# Patient Record
Sex: MALE | Race: WHITE | Employment: OTHER | URBAN - METROPOLITAN AREA
[De-identification: names, ages, dates, MRNs, and addresses within clinical notes are randomized per-mention and may not be internally consistent; named-entity substitution may affect disease eponyms.]

---

## 2017-02-15 ENCOUNTER — TELEPHONE (OUTPATIENT)
Dept: ENDOCRINOLOGY | Age: 67
End: 2017-02-15

## 2017-02-15 DIAGNOSIS — E11.65 TYPE 2 DIABETES MELLITUS WITH HYPERGLYCEMIA, WITHOUT LONG-TERM CURRENT USE OF INSULIN (HCC): Primary | ICD-10-CM

## 2017-02-17 ENCOUNTER — HOSPITAL ENCOUNTER (OUTPATIENT)
Dept: LAB | Age: 67
Discharge: HOME | End: 2017-02-17
Payer: MEDICARE

## 2017-02-17 PROCEDURE — 36415 COLL VENOUS BLD VENIPUNCTURE: CPT

## 2017-02-17 PROCEDURE — 80061 LIPID PANEL: CPT

## 2017-02-17 PROCEDURE — 82043 UR ALBUMIN QUANTITATIVE: CPT

## 2017-02-17 PROCEDURE — 80053 COMPREHEN METABOLIC PANEL: CPT

## 2017-02-17 PROCEDURE — 83036 HEMOGLOBIN GLYCOSYLATED A1C: CPT

## 2017-02-24 ENCOUNTER — OFFICE VISIT (OUTPATIENT)
Dept: ENDOCRINOLOGY | Age: 67
End: 2017-02-24

## 2017-02-24 VITALS
WEIGHT: 210.3 LBS | SYSTOLIC BLOOD PRESSURE: 124 MMHG | TEMPERATURE: 97.5 F | HEART RATE: 72 BPM | BODY MASS INDEX: 30.11 KG/M2 | HEIGHT: 70 IN | RESPIRATION RATE: 16 BRPM | DIASTOLIC BLOOD PRESSURE: 70 MMHG

## 2017-02-24 DIAGNOSIS — E11.65 TYPE 2 DIABETES MELLITUS WITH HYPERGLYCEMIA, WITHOUT LONG-TERM CURRENT USE OF INSULIN (HCC): Primary | ICD-10-CM

## 2017-02-24 DIAGNOSIS — I10 ESSENTIAL HYPERTENSION: ICD-10-CM

## 2017-02-24 RX ORDER — LOSARTAN POTASSIUM 25 MG/1
25 TABLET ORAL DAILY
Qty: 30 TAB | Refills: 5 | Status: SHIPPED | OUTPATIENT
Start: 2017-02-24 | End: 2017-12-21 | Stop reason: SDUPTHER

## 2017-02-24 NOTE — PROGRESS NOTES
Mariama Pinto is a 74 y.o. male here for   Chief Complaint   Patient presents with    Diabetes     3 mo f/u       Functional glucose monitor and record keeping system? - yes  Eye exam within last year? - no  Foot exam within last year? - yes 10 mo ago    Lab Results   Component Value Date/Time    Hemoglobin A1c 7.2 02/17/2017 03:42 PM    Hemoglobin A1c (POC) 6.8 11/23/2016 03:18 PM    Hemoglobin A1c, External 9.2 01/19/2016       Wt Readings from Last 3 Encounters:   12/29/16 211 lb (95.7 kg)   12/07/16 212 lb 4.9 oz (96.3 kg)   11/28/16 215 lb (97.5 kg)     Temp Readings from Last 3 Encounters:   12/29/16 98 °F (36.7 °C) (Oral)   12/14/16 97.5 °F (36.4 °C)   12/07/16 98 °F (36.7 °C)     BP Readings from Last 3 Encounters:   12/29/16 135/76   12/14/16 131/79   12/07/16 134/76     Pulse Readings from Last 3 Encounters:   12/29/16 70   12/14/16 80   12/07/16 76

## 2017-02-24 NOTE — MR AVS SNAPSHOT
Visit Information Date & Time Provider Department Dept. Phone Encounter #  
 2/24/2017  3:30 PM Hector Herring MD [facility] [phone] [id number] Follow-up Instructions Return in about 3 months (around 5/24/2017). Upcoming Health Maintenance Date Due Hepatitis C Screening 1950 FOOT EXAM Q1 7/16/1958 EYE EXAM RETINAL OR DILATED Q1 7/16/1958 DTaP/Tdap/Td series (1 - Tdap) 7/16/1969 FOBT Q 1 YEAR AGE 50-75 7/16/1998 ZOSTER VACCINE AGE 60> 7/16/2008 GLAUCOMA SCREENING Q2Y 7/16/2013 Pneumococcal 65+ Low/Medium Risk (1 of 2 - PCV13) 7/16/2013 MEDICARE YEARLY EXAM 7/16/2013 INFLUENZA AGE 9 TO ADULT 8/1/2016 HEMOGLOBIN A1C Q6M 8/17/2017 MICROALBUMIN Q1 2/17/2018 LIPID PANEL Q1 2/17/2018 Allergies as of 2/24/2017  Review Complete On: 2/24/2017 By: Hector Herring MD  
  
 Severity Noted Reaction Type Reactions Erythromycin  05/12/2014    Nausea and Vomiting Current Immunizations  Never Reviewed No immunizations on file. Not reviewed this visit You Were Diagnosed With   
  
 Codes Comments Type 2 diabetes mellitus with hyperglycemia, without long-term current use of insulin (HCC)    -  Primary ICD-10-CM: E11.65 ICD-9-CM: 250.00, 790.29 Essential hypertension     ICD-10-CM: I10 
ICD-9-CM: 401.9 Vitals BP  
  
  
  
  
  
 124/70 (BP 1 Location: Left arm, BP Patient Position: Sitting) Vitals History BMI and BSA Data Body Mass Index Body Surface Area  
 30.17 kg/m 2 2.17 m 2 Preferred Pharmacy Pharmacy Name Phone [facility] [address] Your Updated Medication List  
  
   
This list is accurate as of: 2/24/17  4:09 PM.  Always use your most recent med list.  
  
  
  
  
 losartan 25 mg tablet Commonly known as:  COZAAR Take 1 Tab by mouth daily. Follow-up Instructions Return in about 3 months (around 5/24/2017). Introducing [facility]! [facility] introduces [employer] patient portal. Now you can access parts of your medical record, email your doctor's office, and request medication refills online. 1. In your internet browser, go to [email or web address] 2. Click on the First Time User? Click Here link in the Sign In box. You will see the New Member Sign Up page. 3. Enter your Shoplocal Access Code exactly as it appears below. You will not need to use this code after youve completed the sign-up process. If you do not sign up before the expiration date, you must request a new code. · Shoplocal Access Code: [email or web address] Expires: 5/25/2017  4:09 PM 
 
4. Enter the last four digits of your Social Security Number (xxxx) and Date of Birth (mm/dd/yyyy) as indicated and click Submit. You will be taken to the next sign-up page. 5. Create a Shoplocal ID. This will be your Shoplocal login ID and cannot be changed, so think of one that is secure and easy to remember. 6. Create a Shoplocal password. You can change your password at any time. 7. Enter your Password Reset Question and Answer. This can be used at a later time if you forget your password. 8. Enter your e-mail address. You will receive e-mail notification when new information is available in [address] 9. Click Sign Up. You can now view and download portions of your medical record. 10. Click the Download Summary menu link to download a portable copy of your medical information. If you have questions, please visit the Frequently Asked Questions section of the Shoplocal website. Remember, [employer] is NOT to be used for urgent needs. For medical emergencies, dial 911. Now available from your iPhone and Android! Please provide this summary of care documentation to your next provider. Your primary care clinician is listed as Yara Ramsey. If you have any questions after today's visit, please call [phone].

## 2017-02-24 NOTE — PROGRESS NOTES
[facility]               Teodora Cruz MD        [address] 78 [phone] Fax [phone]               Patient Information  Date:2/24/2017  Name : Tim Garrison 74 y.o.     YOB: 1950                 Chief Complaint   Patient presents with    Diabetes     3 mo f/u       History of Present Illness: Tim Garrison is a 74 y.o. male here for fu of  Type 2 Diabetes Mellitus. Type 2 Diabetes was diagnosed several years ago  . He was seeing an endocrinologist, Dr. Marcus Mars in [place]. He is not on Metformin   Had hernia surgery - no abd pain     he did not bring the meter or log. I do not have data to adjust the medications. Diet could be better . Wt Readings from Last 3 Encounters:   02/24/17 210 lb 4.8 oz (95.4 kg)   12/29/16 211 lb (95.7 kg)   12/07/16 212 lb 4.9 oz (96.3 kg)       BP Readings from Last 3 Encounters:   02/24/17 124/70   12/29/16 135/76   12/14/16 131/79           Past Medical History:   Diagnosis Date    Diabetes ([facility] [address]     Type 2, diet controlled    Hypertension     Sleep apnea     cpap     Current Outpatient Prescriptions   Medication Sig    losartan (COZAAR) 25 mg tablet Take 1 Tab by mouth daily. No current facility-administered medications for this visit. Allergies   Allergen Reactions    Erythromycin Nausea and Vomiting         Review of Systems:  -   - Gastrointestinal: no dysphagia ,  abdominal pain  - Musculoskeletal: no joint pains no  weakness  - Integumentary: no rashes  - Neurological: no numbness, tingling, no  headaches  - Psychiatric: no depression no  anxiety  - Endocrine: no heat or cold intolerance    Physical Examination:   Blood pressure 124/70, pulse 72, temperature 97.5 °F (36.4 °C), temperature source Oral, resp. rate 16, height 5' 10\" (1.778 m), weight 210 lb 4.8 oz (95.4 kg).  Estimated body mass index is 30.17 kg/(m^2) as calculated from the following: Height as of this encounter: 5' 10\" (1.778 m). -   Weight as of this encounter: 210 lb 4.8 oz (95.4 kg). - General: pleasant, no distress, good eye contact  - HEENT: no pallor, no periorbital edema, EOMI  - Neck: supple, no thyromegaly, no nodules  - Cardiovascular: regular, normal rate, normal S1 and S2,   - Respiratory: clear to auscultation bilaterally  - Gastrointestinal: soft, nontender, nondistended,   - Neurological: alert and oriented  - Psychiatric: normal mood and affect  - Skin: color, texture, turgor normal.       Data Reviewed:     [] Glucose records reviewed. [] See glucose records for details (to be scanned). [] A1C  [] Reviewed labs     [facility] 8/16    Assessment/Plan:     1. Type 2 diabetes mellitus with hyperglycemia, without long-term current use of insulin ([facility] [address]    2. Essential hypertension        1. Type 2 Diabetes Mellitus with no nephropathy,neuropathy,retinopathy  Lab Results   Component Value Date/Time    Hemoglobin A1c 7.2 02/17/2017 03:42 PM    Hemoglobin A1c (POC) 6.8 11/23/2016 03:18 PM    Hemoglobin A1c, External 9.2 01/19/2016     Diet controlled   Refused Metformin XR    2. HTN : Losartan ,     3 Hernia s/p repair , he used to think abdominal pain was due to Metformin and stopped it , I think his pain was due to hernia     4. Obesity:Body mass index is 30.17 kg/(m^2). Discussed about the importance of exercise and carbohydrate portion control. There are no Patient Instructions on file for this visit. Follow-up Disposition: Not on File    Thank you for allowing me to participate in the care of this patient.     Raudel Viera MD      Patient verbalized understanding

## 2017-04-14 ENCOUNTER — OFFICE VISIT (OUTPATIENT)
Dept: FAMILY MEDICINE CLINIC | Age: 67
End: 2017-04-14

## 2017-04-14 VITALS
RESPIRATION RATE: 18 BRPM | HEART RATE: 73 BPM | OXYGEN SATURATION: 95 % | TEMPERATURE: 97.9 F | BODY MASS INDEX: 29.52 KG/M2 | HEIGHT: 70 IN | DIASTOLIC BLOOD PRESSURE: 82 MMHG | SYSTOLIC BLOOD PRESSURE: 133 MMHG | WEIGHT: 206.2 LBS

## 2017-04-14 DIAGNOSIS — G47.33 OSA ON CPAP: Primary | ICD-10-CM

## 2017-04-14 DIAGNOSIS — Z99.89 OSA ON CPAP: Primary | ICD-10-CM

## 2017-04-14 NOTE — PROGRESS NOTES
Reviewed record in preparation for visit and have necessary documentation  Pt did not bring medication to office visit for review  Information was given on Shingles Vaccine  opportunity was given for questions  Goals that were addressed and/or need to be completed during or after this appointment include   Health Maintenance Due   Topic Date Due    Hepatitis C Screening  1950    FOOT EXAM Q1  07/16/1958    EYE EXAM RETINAL OR DILATED Q1  07/16/1958    DTaP/Tdap/Td series (1 - Tdap) 07/16/1969    FOBT Q 1 YEAR AGE 50-75  07/16/1998    ZOSTER VACCINE AGE 60>  07/16/2008    GLAUCOMA SCREENING Q2Y  07/16/2013    Pneumococcal 65+ Low/Medium Risk (1 of 2 - PCV13) 07/16/2013    MEDICARE YEARLY EXAM  07/16/2013    INFLUENZA AGE 9 TO ADULT  08/01/2016

## 2017-04-14 NOTE — PATIENT INSTRUCTIONS
Learning About CPAP for Sleep Apnea  What is CPAP? CPAP is a small machine that you use at home every night while you sleep. It increases air pressure in your throat to keep your airway open. When you have sleep apnea, this can help you sleep better so you feel much better. CPAP stands for \"continuous positive airway pressure. \"  The CPAP machine will have one of the following:  · A mask that covers your nose and mouth  · Prongs that fit into your nose  · A mask that covers your nose only, the most common type. This type is called NCPAP. The N stands for \"nasal.\"  Why is it done? CPAP is usually the best treatment for obstructive sleep apnea. It is the first treatment choice and the most widely used. Your doctor may suggest CPAP if you have:  · Moderate to severe sleep apnea. · Sleep apnea and coronary artery disease (CAD) or heart failure. How does it help? · CPAP can help you have more normal sleep, so you feel less sleepy and more alert during the daytime. · CPAP may help keep heart failure or other heart problems from getting worse. · CPAP may help lower your blood pressure. · If you use CPAP, your bed partner may also sleep better because you are not snoring or restless. What are the side effects? Some people who use CPAP have:  · A dry or stuffy nose and a sore throat. · Irritated skin on the face. · Sore eyes. · Bloating. If you have any of these problems, work with your doctor to fix them. Here are some things you can try:  · Be sure the mask or nasal prongs fit well. · See if your doctor can adjust the pressure of your CPAP. · If your nose is dry, try a humidifier. · If your nose is runny or stuffy, try decongestant medicine or a steroid nasal spray. Be safe with medicines. Read and follow all instructions on the label. Do not use the medicine longer than the label says. If these things do not help, you might try a different type of machine.  Some machines have air pressure that adjusts on its own. Others have air pressures that are different when you breathe in than when you breathe out. This may reduce discomfort caused by too much pressure in your nose. Where can you learn more? Go to http://martin-cassandra.info/. Enter F159 in the search box to learn more about \"Learning About CPAP for Sleep Apnea. \"  Current as of: May 23, 2016  Content Version: 11.2  © 3759-8909 [employer]. Care instructions adapted under license by [employer] (which disclaims liability or warranty for this information). If you have questions about a medical condition or this instruction, always ask your healthcare professional. [place] [address] any warranty or liability for your use of this information. Sleep Apnea: Care Instructions  Your Care Instructions  Sleep apnea means that you frequently stop breathing for 10 seconds or longer during sleep. It can be mild to severe, based on the number of times an hour that you stop breathing or have slowed breathing. Blocked or narrowed airways in your nose, mouth, or throat can cause sleep apnea. Your airway can become blocked when your throat muscles and tongue relax during sleep. You can treat sleep apnea at home by making lifestyle changes. You also can use a CPAP breathing machine that keeps tissues in the throat from blocking your airway. Or your doctor may suggest that you use a breathing device while you sleep. It helps keep your airway open. This could be a device that you put in your mouth. Other examples include strips or disks that you use on your nose. In some cases, surgery may be needed to remove enlarged tissues in the throat. Follow-up care is a key part of your treatment and safety. Be sure to make and go to all appointments, and call your doctor if you are having problems. It's also a good idea to know your test results and keep a list of the medicines you take.   How can you care for yourself at home? · Lose weight, if needed. It may reduce the number of times you stop breathing or have slowed breathing. · Sleep on your side. It may stop mild apnea. If you tend to roll onto your back, sew a pocket in the back of your pajama top. Put a tennis ball into the pocket, and stitch the pocket shut. This will help keep you from sleeping on your back. · Avoid alcohol and medicines such as sleeping pills and sedatives before bed. · Do not smoke. Smoking can make sleep apnea worse. If you need help quitting, talk to your doctor about stop-smoking programs and medicines. These can increase your chances of quitting for good. · Prop up the head of your bed 4 to 6 inches by putting bricks under the legs of the bed. · Treat breathing problems, such as a stuffy nose, caused by a cold or allergies. · Try a continuous positive airway pressure (CPAP) breathing machine if your doctor recommends it. The machine keeps your airway open when you sleep. · If CPAP does not work for you, ask your doctor if you can try other breathing machines. A bilevel positive airway pressure machine uses one type of air pressure for breathing in and another type for breathing out. Another device raises or lowers air pressure as needed while you breathe. · Talk to your doctor if:  ¨ Your nose feels dry or bleeds when you use one of these machines. You may need to increase moisture in the air. A humidifier may help. ¨ Your nose is runny or stuffy from using a breathing machine. Decongestants or a corticosteroid nasal spray may help. ¨ You are sleepy during the day and it gets in the way of the normal things you do. Do not drive when you are drowsy. When should you call for help? Watch closely for changes in your health, and be sure to contact your doctor if:  · You still have sleep apnea even though you have made lifestyle changes. · You are thinking of trying a device such as CPAP.   · You are having problems using a CPAP or similar machine. Where can you learn more? Go to http://martin-cassandra.info/. Enter D480 in the search box to learn more about \"Sleep Apnea: Care Instructions. \"  Current as of: May 23, 2016  Content Version: 11.2  © 4435-9261 [employer], [employer]. Care instructions adapted under license by [employer] (which disclaims liability or warranty for this information). If you have questions about a medical condition or this instruction, always ask your healthcare professional. [address] any warranty or liability for your use of this information.

## 2017-04-14 NOTE — PROGRESS NOTES
Radha Lepe is a 74 y.o. male who presents with the following complaints:  Chief Complaint   Patient presents with    New Patient     Cpap       Subjective:    HPI:   Patient presents requesting orders for CPAP. He was advised that I could write new orders for him today by the appointment center. He has recently moved to the area and states his CPAP machine is broken. He has not been sleeping well for the past week since it stopped working. We have on hand a copy of his latest sleep study performed 2 years ago in at [facility] in [place]. He reports he was dx with JOVANY 15 years ago and has been using CPAP ever since. His diabetes is being managed by Dr. Kamilah Higginbotham. He reports he was advised to start metformin but refused, reports he is controlling with diet.     Lab Results   Component Value Date/Time    Hemoglobin A1c 7.2 02/17/2017 03:42 PM    Hemoglobin A1c (POC) 6.8 11/23/2016 03:18 PM    Hemoglobin A1c, External 9.2 01/19/2016           Pertinent PMH/FH/SH:  Past Medical History:   Diagnosis Date    Diabetes ([address]     Type 2, diet controlled    Hypertension     Sleep apnea     cpap     Past Surgical History:   Procedure Laterality Date    HX HERNIA REPAIR Bilateral      Family History   Problem Relation Age of Onset    Cancer Mother      melanoma    Heart Disease Father      Social History     Social History    Marital status:      Spouse name: N/A    Number of children: N/A    Years of education: N/A     Social History Main Topics    Smoking status: Never Smoker    Smokeless tobacco: Never Used    Alcohol use Yes      Comment: 1-2 drinks every 3 months    Drug use: No    Sexual activity: Not Asked     Other Topics Concern    None     Social History Narrative     Advanced Directives: N      Patient Active Problem List    Diagnosis    Type 2 diabetes mellitus with hyperglycemia, without long-term current use of insulin ([address]    Essential hypertension       Nurse notes were reviewed and are correct  Review of Systems - negative except as listed above in the HPI    Objective:     Vitals:    04/14/17 1337   BP: 133/82   Pulse: 73   Resp: 18   Temp: 97.9 °F (36.6 °C)   TempSrc: Oral   SpO2: 95%   Weight: 206 lb 3.2 oz (93.5 kg)   Height: 5' 10\" (1.778 m)     Physical Examination: General appearance - alert, well appearing, and in no distress, oriented to person, place, and time and normal appearing weight  Mental status - normal mood, behavior, speech, dress, motor activity, and thought processes  Chest - clear to auscultation, no wheezes, rales or rhonchi, symmetric air entry  Heart - normal rate, regular rhythm, normal S1, S2, no murmurs, rubs, clicks or gallops  Neurological - alert, oriented, normal speech, no focal findings or movement disorder noted  Skin - normal coloration and turgor    Assessment/ Plan:   Glorine Hand was seen today for new patient. Diagnoses and all orders for this visit:    JOVANY on CPAP  Orders for CPAP need to come from sleep specialist  Apologized for the incorrect information given by appointment staff  We were able to obtain an appointment on 4/20 for the patient with Dao. We will fax a copy of the sleep study from 2015 to their office  -     SLEEP MEDICINE REFERRAL       Follow-up Disposition:  Return in about 4 weeks (around 5/12/2017) for CPE. I have discussed the diagnosis with the patient and the intended plan as seen in the above orders. The patient has received an after-visit summary and questions were answered concerning future plans. The patient verbalizes understanding. Medication Side Effects and Warnings were discussed with patient: yes  Patient Labs were reviewed and or requested: yes  Patient Past Records were reviewed and or requested: yes    Patient Instructions        Learning About CPAP for Sleep Apnea  What is CPAP? CPAP is a small machine that you use at home every night while you sleep.  It increases air pressure in your throat to keep your airway open. When you have sleep apnea, this can help you sleep better so you feel much better. CPAP stands for \"continuous positive airway pressure. \"  The CPAP machine will have one of the following:  · A mask that covers your nose and mouth  · Prongs that fit into your nose  · A mask that covers your nose only, the most common type. This type is called NCPAP. The N stands for \"nasal.\"  Why is it done? CPAP is usually the best treatment for obstructive sleep apnea. It is the first treatment choice and the most widely used. Your doctor may suggest CPAP if you have:  · Moderate to severe sleep apnea. · Sleep apnea and coronary artery disease (CAD) or heart failure. How does it help? · CPAP can help you have more normal sleep, so you feel less sleepy and more alert during the daytime. · CPAP may help keep heart failure or other heart problems from getting worse. · CPAP may help lower your blood pressure. · If you use CPAP, your bed partner may also sleep better because you are not snoring or restless. What are the side effects? Some people who use CPAP have:  · A dry or stuffy nose and a sore throat. · Irritated skin on the face. · Sore eyes. · Bloating. If you have any of these problems, work with your doctor to fix them. Here are some things you can try:  · Be sure the mask or nasal prongs fit well. · See if your doctor can adjust the pressure of your CPAP. · If your nose is dry, try a humidifier. · If your nose is runny or stuffy, try decongestant medicine or a steroid nasal spray. Be safe with medicines. Read and follow all instructions on the label. Do not use the medicine longer than the label says. If these things do not help, you might try a different type of machine. Some machines have air pressure that adjusts on its own. Others have air pressures that are different when you breathe in than when you breathe out.  This may reduce discomfort caused by too much pressure in your nose. Where can you learn more? Go to http://martin-cassandra.info/. Enter A033 in the search box to learn more about \"Learning About CPAP for Sleep Apnea. \"  Current as of: May 23, 2016  Content Version: 11.2  © 7814-3867 [employer]. Care instructions adapted under license by [employer] (which disclaims liability or warranty for this information). If you have questions about a medical condition or this instruction, always ask your healthcare professional. [place] [address] any warranty or liability for your use of this information. Sleep Apnea: Care Instructions  Your Care Instructions  Sleep apnea means that you frequently stop breathing for 10 seconds or longer during sleep. It can be mild to severe, based on the number of times an hour that you stop breathing or have slowed breathing. Blocked or narrowed airways in your nose, mouth, or throat can cause sleep apnea. Your airway can become blocked when your throat muscles and tongue relax during sleep. You can treat sleep apnea at home by making lifestyle changes. You also can use a CPAP breathing machine that keeps tissues in the throat from blocking your airway. Or your doctor may suggest that you use a breathing device while you sleep. It helps keep your airway open. This could be a device that you put in your mouth. Other examples include strips or disks that you use on your nose. In some cases, surgery may be needed to remove enlarged tissues in the throat. Follow-up care is a key part of your treatment and safety. Be sure to make and go to all appointments, and call your doctor if you are having problems. It's also a good idea to know your test results and keep a list of the medicines you take. How can you care for yourself at home? · Lose weight, if needed. It may reduce the number of times you stop breathing or have slowed breathing.   · Sleep on your side. It may stop mild apnea. If you tend to roll onto your back, sew a pocket in the back of your pajama top. Put a tennis ball into the pocket, and stitch the pocket shut. This will help keep you from sleeping on your back. · Avoid alcohol and medicines such as sleeping pills and sedatives before bed. · Do not smoke. Smoking can make sleep apnea worse. If you need help quitting, talk to your doctor about stop-smoking programs and medicines. These can increase your chances of quitting for good. · Prop up the head of your bed 4 to 6 inches by putting bricks under the legs of the bed. · Treat breathing problems, such as a stuffy nose, caused by a cold or allergies. · Try a continuous positive airway pressure (CPAP) breathing machine if your doctor recommends it. The machine keeps your airway open when you sleep. · If CPAP does not work for you, ask your doctor if you can try other breathing machines. A bilevel positive airway pressure machine uses one type of air pressure for breathing in and another type for breathing out. Another device raises or lowers air pressure as needed while you breathe. · Talk to your doctor if:  ¨ Your nose feels dry or bleeds when you use one of these machines. You may need to increase moisture in the air. A humidifier may help. ¨ Your nose is runny or stuffy from using a breathing machine. Decongestants or a corticosteroid nasal spray may help. ¨ You are sleepy during the day and it gets in the way of the normal things you do. Do not drive when you are drowsy. When should you call for help? Watch closely for changes in your health, and be sure to contact your doctor if:  · You still have sleep apnea even though you have made lifestyle changes. · You are thinking of trying a device such as CPAP. · You are having problems using a CPAP or similar machine. Where can you learn more? Go to http://martin-cassandra.info/.   Enter O890 in the search box to learn more about \"Sleep Apnea: Care Instructions. \"  Current as of: May 23, 2016  Content Version: 11.2  © 1776-0686 [employer]. Care instructions adapted under license by [employer] (which disclaims liability or warranty for this information). If you have questions about a medical condition or this instruction, always ask your healthcare professional. [place] [address] any warranty or liability for your use of this information.           Alicia SOLIS

## 2017-04-14 NOTE — MR AVS SNAPSHOT
Visit Information Date & Time Provider Department Dept. Phone Encounter #  
 4/14/2017  1:30 PM [facility] [phone] [id number] Follow-up Instructions Return in about 4 weeks (around 5/12/2017) for CPE. Your Appointments 4/20/2017  9:20 AM  
New Patient with Tapan Martínez MD  
[address] ([facility]) Appt Note: NP; ref NP Rachel Backers; needs CPAP continuation/treatment; Medicare (prev sleep records will be faxed) [address] [facility] [address] [phone] [address] [facility] [phone] Upcoming Health Maintenance Date Due Hepatitis C Screening 1950 FOOT EXAM Q1 7/16/1958 EYE EXAM RETINAL OR DILATED Q1 7/16/1958 DTaP/Tdap/Td series (1 - Tdap) 7/16/1969 FOBT Q 1 YEAR AGE 50-75 7/16/1998 ZOSTER VACCINE AGE 60> 7/16/2008 GLAUCOMA SCREENING Q2Y 7/16/2013 Pneumococcal 65+ Low/Medium Risk (1 of 2 - PCV13) 7/16/2013 MEDICARE YEARLY EXAM 7/16/2013 INFLUENZA AGE 9 TO ADULT 8/1/2016 HEMOGLOBIN A1C Q6M 8/17/2017 MICROALBUMIN Q1 2/17/2018 LIPID PANEL Q1 2/17/2018 Allergies as of 4/14/2017  Review Complete On: 4/14/2017 By: Salvadore Moritz, NP Severity Noted Reaction Type Reactions Erythromycin  05/12/2014    Nausea and Vomiting Current Immunizations  Never Reviewed No immunizations on file. Not reviewed this visit You Were Diagnosed With   
  
 Codes Comments JOVANY on CPAP    -  Primary ICD-10-CM: G47.33, Z99.89 ICD-9-CM: 327.23, V46.8 Vitals BP Pulse Temp Resp Height(growth percentile) Weight(growth percentile) 133/82 (BP 1 Location: Left arm, BP Patient Position: Sitting) 73 97.9 °F (36.6 °C) (Oral) 18 5' 10\" (1.778 m) 206 lb 3.2 oz (93.5 kg) SpO2 BMI Smoking Status 95% 29.59 kg/m2 Never Smoker Vitals History BMI and BSA Data Body Mass Index Body Surface Area  
 29.59 kg/m 2 2.15 m 2 Preferred Pharmacy Pharmacy Name Phone [facility] [address] Your Updated Medication List  
  
   
This list is accurate as of: 4/14/17  2:03 PM.  Always use your most recent med list.  
  
  
  
  
 losartan 25 mg tablet Commonly known as:  COZAAR Take 1 Tab by mouth daily. We Performed the Following SLEEP MEDICINE REFERRAL [HYL729 Custom] Comments:  
 Please evaluate patient for JOVANY, needs new cpap orders. Follow-up Instructions Return in about 4 weeks (around 5/12/2017) for CPE. Referral Information Referral ID Referred By Referred To  
  
 [id number] Jonathan Yamil, [address] [facility] [address] Phone: [phone] Fax: [phone] Visits Status Start Date End Date 1 New Request 4/14/17 4/14/18 If your referral has a status of pending review or denied, additional information will be sent to support the outcome of this decision. Patient Instructions Learning About CPAP for Sleep Apnea What is CPAP? CPAP is a small machine that you use at home every night while you sleep. It increases air pressure in your throat to keep your airway open. When you have sleep apnea, this can help you sleep better so you feel much better. CPAP stands for \"continuous positive airway pressure. \" The CPAP machine will have one of the following: · A mask that covers your nose and mouth · Prongs that fit into your nose · A mask that covers your nose only, the most common type. This type is called NCPAP. The N stands for \"nasal.\" Why is it done? CPAP is usually the best treatment for obstructive sleep apnea. It is the first treatment choice and the most widely used. Your doctor may suggest CPAP if you have: · Moderate to severe sleep apnea. · Sleep apnea and coronary artery disease (CAD) or heart failure. How does it help? · CPAP can help you have more normal sleep, so you feel less sleepy and more alert during the daytime. · CPAP may help keep heart failure or other heart problems from getting worse. · CPAP may help lower your blood pressure. · If you use CPAP, your bed partner may also sleep better because you are not snoring or restless. What are the side effects? Some people who use CPAP have: · A dry or stuffy nose and a sore throat. · Irritated skin on the face. · Sore eyes. · Bloating. If you have any of these problems, work with your doctor to fix them. Here are some things you can try: · Be sure the mask or nasal prongs fit well. · See if your doctor can adjust the pressure of your CPAP. · If your nose is dry, try a humidifier. · If your nose is runny or stuffy, try decongestant medicine or a steroid nasal spray. Be safe with medicines. Read and follow all instructions on the label. Do not use the medicine longer than the label says. If these things do not help, you might try a different type of machine. Some machines have air pressure that adjusts on its own. Others have air pressures that are different when you breathe in than when you breathe out. This may reduce discomfort caused by too much pressure in your nose. Where can you learn more? Go to http://martin-cassandra.info/. Enter K586 in the search box to learn more about \"Learning About CPAP for Sleep Apnea. \" Current as of: May 23, 2016 Content Version: 11.2 © 8437-6154 Remotemedical. Care instructions adapted under license by Dhing[employer] (which disclaims liability or warranty for this information).  If you have questions about a medical condition or this instruction, always ask your healthcare professional. [address] any warranty or liability for your use of this information. Sleep Apnea: Care Instructions Your Care Instructions Sleep apnea means that you frequently stop breathing for 10 seconds or longer during sleep. It can be mild to severe, based on the number of times an hour that you stop breathing or have slowed breathing. Blocked or narrowed airways in your nose, mouth, or throat can cause sleep apnea. Your airway can become blocked when your throat muscles and tongue relax during sleep. You can treat sleep apnea at home by making lifestyle changes. You also can use a CPAP breathing machine that keeps tissues in the throat from blocking your airway. Or your doctor may suggest that you use a breathing device while you sleep. It helps keep your airway open. This could be a device that you put in your mouth. Other examples include strips or disks that you use on your nose. In some cases, surgery may be needed to remove enlarged tissues in the throat. Follow-up care is a key part of your treatment and safety. Be sure to make and go to all appointments, and call your doctor if you are having problems. It's also a good idea to know your test results and keep a list of the medicines you take. How can you care for yourself at home? · Lose weight, if needed. It may reduce the number of times you stop breathing or have slowed breathing. · Sleep on your side. It may stop mild apnea. If you tend to roll onto your back, sew a pocket in the back of your pajama top. Put a tennis ball into the pocket, and stitch the pocket shut. This will help keep you from sleeping on your back. · Avoid alcohol and medicines such as sleeping pills and sedatives before bed. · Do not smoke. Smoking can make sleep apnea worse. If you need help quitting, talk to your doctor about stop-smoking programs and medicines. These can increase your chances of quitting for good. · Prop up the head of your bed 4 to 6 inches by putting bricks under the legs of the bed. · Treat breathing problems, such as a stuffy nose, caused by a cold or allergies. · Try a continuous positive airway pressure (CPAP) breathing machine if your doctor recommends it. The machine keeps your airway open when you sleep. · If CPAP does not work for you, ask your doctor if you can try other breathing machines. A bilevel positive airway pressure machine uses one type of air pressure for breathing in and another type for breathing out. Another device raises or lowers air pressure as needed while you breathe. · Talk to your doctor if: 
¨ Your nose feels dry or bleeds when you use one of these machines. You may need to increase moisture in the air. A humidifier may help. ¨ Your nose is runny or stuffy from using a breathing machine. Decongestants or a corticosteroid nasal spray may help. ¨ You are sleepy during the day and it gets in the way of the normal things you do. Do not drive when you are drowsy. When should you call for help? Watch closely for changes in your health, and be sure to contact your doctor if: 
· You still have sleep apnea even though you have made lifestyle changes. · You are thinking of trying a device such as CPAP. · You are having problems using a CPAP or similar machine. Where can you learn more? Go to http://martin-cassandra.info/. Enter Y807 in the search box to learn more about \"Sleep Apnea: Care Instructions. \" Current as of: May 23, 2016 Content Version: 11.2 © 7759-8132 WePay. Care instructions adapted under license by Pure Software (which disclaims liability or warranty for this information). If you have questions about a medical condition or this instruction, always ask your healthcare professional. [address] any warranty or liability for your use of this information. Introducing [facility]!    
 [facility] introduces MetaSolv patient portal. Now you can access parts of your medical record, email your doctor's office, and request medication refills online. 1. In your internet browser, go to [email or web address] 2. Click on the First Time User? Click Here link in the Sign In box. You will see the New Member Sign Up page. 3. Enter your [employer] Access Code exactly as it appears below. You will not need to use this code after youve completed the sign-up process. If you do not sign up before the expiration date, you must request a new code. · [employer] Access Code: [email or web address] Expires: 5/25/2017  5:09 PM 
 
4. Enter the last four digits of your Social Security Number (xxxx) and Date of Birth (mm/dd/yyyy) as indicated and click Submit. You will be taken to the next sign-up page. 5. Create a [employer] ID. This will be your [employer] login ID and cannot be changed, so think of one that is secure and easy to remember. 6. Create a [employer] password. You can change your password at any time. 7. Enter your Password Reset Question and Answer. This can be used at a later time if you forget your password. 8. Enter your e-mail address. You will receive e-mail notification when new information is available in [address] 9. Click Sign Up. You can now view and download portions of your medical record. 10. Click the Download Summary menu link to download a portable copy of your medical information. If you have questions, please visit the Frequently Asked Questions section of the [employer] website. Remember, [employer] is NOT to be used for urgent needs. For medical emergencies, dial 911. Now available from your iPhone and Android! Please provide this summary of care documentation to your next provider. Your primary care clinician is listed as Jim Dozier. If you have any questions after today's visit, please call [phone].

## 2017-04-20 ENCOUNTER — DOCUMENTATION ONLY (OUTPATIENT)
Dept: SLEEP MEDICINE | Age: 67
End: 2017-04-20

## 2017-04-20 ENCOUNTER — OFFICE VISIT (OUTPATIENT)
Dept: SLEEP MEDICINE | Age: 67
End: 2017-04-20

## 2017-04-20 VITALS
WEIGHT: 207 LBS | HEIGHT: 70 IN | OXYGEN SATURATION: 95 % | DIASTOLIC BLOOD PRESSURE: 70 MMHG | TEMPERATURE: 98 F | BODY MASS INDEX: 29.63 KG/M2 | SYSTOLIC BLOOD PRESSURE: 146 MMHG | HEART RATE: 64 BPM

## 2017-04-20 DIAGNOSIS — G47.33 OSA (OBSTRUCTIVE SLEEP APNEA): Primary | ICD-10-CM

## 2017-04-20 DIAGNOSIS — E66.3 OVERWEIGHT (BMI 25.0-29.9): ICD-10-CM

## 2017-04-20 NOTE — PROGRESS NOTES
217 [facility]., Tameka Lyle [address], [address]  Tel.  [phone]  Fax. 100 [facility] 60  [place], [address] [facility]  Tel.  [phone]  Fax. [phone] [address]   Tel.  [phone]  Fax. [phone]       Subjective:      Juve Nunez is a 74 y.o. male who I am asked to see in consultation for evaluation and management of sleep apnea. He was diagnosed with sleep apnea several years ago. He is using his device regularly. He complains of awakening in the middle of the night because of SOB associated with CPAP dysfunction. Symptoms began a few weeks ago, unchanged since that time. He usually can fall asleep in 5 minutes. Family or house members note snoring, choking. He denies completely or partially paralyzed while falling asleep or waking up. There are maximal  mask comfort problems. The following problems are noted with PAP:     Drowsiness yes Problems exhaling yes   Snoring yes Forget to put on no   Mask Comfortable yes Can't fall asleep yes   Dry Mouth yes Mask falls off yes   Air Leaking no Frequent awakenings yes     Juve Nunez does wake up frequently at night. He   bothered by waking up too early and left unable to get back to sleep. He actually sleeps about 8 hours at night and wakes up about 7 times during the night. He does not work shifts: Giovanny Cantor indicates he does not get too little sleep at night. His bedtime is 2300. He awakens at 0700. He does take naps. He takes 2 naps a week lasting 1, Hour(s). He has the following observed behaviors: Pauses in breathing;  . Other remarks: snoting(unspecified)    [place] Sleepiness Score: 6   which reflect moderate daytime drowsiness. Allergies   Allergen Reactions    Erythromycin Nausea and Vomiting         Current Outpatient Prescriptions:     losartan (COZAAR) 25 mg tablet, Take 1 Tab by mouth daily. , Disp: 30 Tab, Rfl: 5     He  has a past medical history of Diabetes ([address] Hypertension; and Sleep apnea. He also has no past medical history of Malignant hyperthermia due to anesthesia. He  has a past surgical history that includes hernia repair (Bilateral). He family history includes Cancer in his mother; Heart Disease in his father. He  reports that he has never smoked. He has never used smokeless tobacco. He reports that he drinks alcohol. He reports that he does not use illicit drugs. Review of Systems:  Constitutional:  No significant weight loss or weight gain. Eyes:  No blurred vision. CVS:  No significant chest pain  Pulm:  No significant shortness of breath  GI:  No significant nausea or vomiting  :  No significant nocturia  Musculoskeletal:  No significant joint pain at night  Skin:  No significant rashes  Neuro:  No significant dizziness   Psych:  No active mood issues    Sleep Review of Systems: notable for no difficulty falling asleep; frequent awakenings at night;  irregular dreaming noted; no nightmares ; no early morning headaches ; no memory problems; no concentration issues; no history of any automobile or occupational accidents due to daytime drowsiness. Objective:     Visit Vitals    /70    Pulse 64    Temp 98 °F (36.7 °C)    Ht 5' 10\" (1.778 m)    Wt 207 lb (93.9 kg)    SpO2 95%    BMI 29.7 kg/m2    Neck circ. in \"inches\": 16.5      General:   Alert, oriented, not in distress   Neck:   No JVD    Chest/Lungs:  symetrical lung expansion , no accessory muscle use    Extremities:  no obvious rashes , negative edema    Neuro:  No focal deficits ; No obvious tremor    Psych:  Normal affect ,  Normal countenance ;         Assessment:       ICD-10-CM ICD-9-CM    1. JOVANY (obstructive sleep apnea) G47.33 327.23 AMB SUPPLY ORDER   2. Overweight (BMI 25.0-29. 9) E66.3 278.02        Plan:     * He was provided information on sleep apnea including coresponding risk factors and the importance of proper treatment.   * He was likewise counseled on weight management and lateral sleeping posture (with the use of bed pillows or wedge) which may reduce sleep apnea events. Caution with hypnotics, alcohol, and sedating pain medications as these can worsen sleep apnea. * We've requested previous sleep records and studies. Orders Placed This Encounter    AMB SUPPLY ORDER     * Loaner repair/replace Positive Airway Pressure device. Current device dysfunctional.  * AutoCPAP APAP 5 cmH2O - 15 cmH2O. Flex  As needed  * Respironics device with heated tube as needed  PAP Mask  patient preference,heated humidifer, tubing, filters (all sleep supplies) as needed    Wireless modem enrollment with privileges to change therapy remotely - indefinite. Length of Need = 99 months      Catalino Castillo M.D.  (electronically signed)  Diplomate in Sleep Medicine, [facility]   * PAP card download in 4 weeks. PAP clinic if adherence remains poor    Counseling was provided regarding the importance of regular PAP use and on proper sleep hygiene and safe driving. I have reviewed/discussed the above normal BMI with the patient. I have recommended the following interventions: dietary management education, guidance, and counseling . Lorenso Frankel Thank you for allowing us to participate in your patient's medical care.     Catalino Castillo M.D.  (electronically signed)  Diplomate in Sleep Medicine, [facility]

## 2017-04-20 NOTE — PROGRESS NOTES
Sleep records request from Dr. Heriberto Darnell, urgent request submitted fax # [phone] or [phone].

## 2017-04-20 NOTE — PROGRESS NOTES
Order faxed to [address] [place] [address] Rd.. However, still awaiting previous records (baseline sleep study). We have faxed medical release request to prior 's office in [place].   He moved from [place]

## 2017-04-20 NOTE — MR AVS SNAPSHOT
Visit Information Date & Time Provider Department Dept. Phone Encounter #  
 4/20/2017  9:20 AM Sulaiman Poe MD [facility] 53 [id number] Follow-up Instructions Return if symptoms worsen or fail to improve. Follow-up and Disposition History Upcoming Health Maintenance Date Due Hepatitis C Screening 1950 FOOT EXAM Q1 7/16/1958 EYE EXAM RETINAL OR DILATED Q1 7/16/1958 DTaP/Tdap/Td series (1 - Tdap) 7/16/1969 FOBT Q 1 YEAR AGE 50-75 7/16/1998 ZOSTER VACCINE AGE 60> 7/16/2008 GLAUCOMA SCREENING Q2Y 7/16/2013 Pneumococcal 65+ Low/Medium Risk (1 of 2 - PCV13) 7/16/2013 MEDICARE YEARLY EXAM 7/16/2013 INFLUENZA AGE 9 TO ADULT 8/1/2016 HEMOGLOBIN A1C Q6M 8/17/2017 MICROALBUMIN Q1 2/17/2018 LIPID PANEL Q1 2/17/2018 Allergies as of 4/20/2017  Review Complete On: 4/20/2017 By: Sulaiman Poe MD  
  
 Severity Noted Reaction Type Reactions Erythromycin  05/12/2014    Nausea and Vomiting Current Immunizations  Never Reviewed No immunizations on file. Not reviewed this visit You Were Diagnosed With   
  
 Codes Comments JOVANY (obstructive sleep apnea)    -  Primary ICD-10-CM: G47.33 
ICD-9-CM: 327.23 Overweight (BMI 25.0-29. 9)     ICD-10-CM: Y38.4 ICD-9-CM: 278.02 Vitals BP Pulse Temp Height(growth percentile) Weight(growth percentile) SpO2  
 146/70 64 98 °F (36.7 °C) 5' 10\" (1.778 m) 207 lb (93.9 kg) 95% BMI Smoking Status 29.7 kg/m2 Never Smoker Vitals History BMI and BSA Data Body Mass Index Body Surface Area  
 29.7 kg/m 2 2.15 m 2 Preferred Pharmacy Pharmacy Name Phone [facility] [address] [place] [address] [address] Your Updated Medication List  
  
   
This list is accurate as of: 4/20/17  9:35 AM.  Always use your most recent med list.  
  
  
  
  
 losartan 25 mg tablet Commonly known as:  COZAAR Take 1 Tab by mouth daily. We Performed the Following AMB SUPPLY ORDER [[id number] Custom] Comments:  
 * Loaner repair/replace Positive Airway Pressure device. Current device dysfunctional. 
* AutoCPAP APAP 5 cmH2O - 15 cmH2O. Flex  As needed * Respironics device with heated tube as needed PAP Mask  patient preference,heated humidifer, tubing, filters (all sleep supplies) as needed Wireless modem enrollment with privileges to change therapy remotely - indefinite. Length of Need = 99 months Jodi Jeffries M.D.  (electronically signed) Diplomate in Sleep Medicine, [facility] Follow-up Instructions Return if symptoms worsen or fail to improve. Patient Instructions 217 [facility]., [place]. [address] Tel.  [phone] Fax. 100 [facility] [address] [place], [address] [facility] Tel.  [phone] Fax. [phone] [address] [place] [address] Tel.  [phone] Fax. [phone] Learning About CPAP for Sleep Apnea What is CPAP? CPAP is a small machine that you use at home every night while you sleep. It increases air pressure in your throat to keep your airway open. When you have sleep apnea, this can help you sleep better so you feel much better. CPAP stands for \"continuous positive airway pressure. \" The CPAP machine will have one of the following: · A mask that covers your nose and mouth · Prongs that fit into your nose · A mask that covers your nose only, the most common type. This type is called NCPAP. The N stands for \"nasal.\" Why is it done? CPAP is usually the best treatment for obstructive sleep apnea. It is the first treatment choice and the most widely used. Your doctor may suggest CPAP if you have: · Moderate to severe sleep apnea. · Sleep apnea and coronary artery disease (CAD) or heart failure. How does it help? · CPAP can help you have more normal sleep, so you feel less sleepy and more alert during the daytime. · CPAP may help keep heart failure or other heart problems from getting worse. · NCPAP may help lower your blood pressure. · If you use CPAP, your bed partner may also sleep better because you are not snoring or restless. What are the side effects? Some people who use CPAP have: · A dry or stuffy nose and a sore throat. · Irritated skin on the face. · Sore eyes. · Bloating. If you have any of these problems, work with your doctor to fix them. Here are some things you can try: · Be sure the mask or nasal prongs fit well. · See if your doctor can adjust the pressure of your CPAP. · If your nose is dry, try a humidifier. · If your nose is runny or stuffy, try decongestant medicine or a steroid nasal spray. If these things do not help, you might try a different type of machine. Some machines have air pressure that adjusts on its own. Others have air pressures that are different when you breathe in than when you breathe out. This may reduce discomfort caused by too much pressure in your nose. Where can you learn more? Go to HutGrip.be Enter E205 in the search box to learn more about \"Learning About CPAP for Sleep Apnea. \"  
© 5959-2450 Healthwise, Incorporated. Care instructions adapted under license by [employer] (which disclaims liability or warranty for this information). This care instruction is for use with your licensed healthcare professional. If you have questions about a medical condition or this instruction, always ask your healthcare professional. [place] [address] any warranty or liability for your use of this information. Content Version: 5.0.07725; Last Revised: January 11, 2010 PROPER SLEEP HYGIENE What to avoid · Do not have drinks with caffeine, such as coffee or black tea, for 8 hours before bed. · Do not smoke or use other types of tobacco near bedtime. Nicotine is a stimulant and can keep you awake. · Avoid drinking alcohol late in the evening, because it can cause you to wake in the middle of the night. · Do not eat a big meal close to bedtime. If you are hungry, eat a light snack. · Do not drink a lot of water close to bedtime, because the need to urinate may wake you up during the night. · Do not read or watch TV in bed. Use the bed only for sleeping and sexual activity. What to try · Go to bed at the same time every night, and wake up at the same time every morning. Do not take naps during the day. · Keep your bedroom quiet, dark, and cool. · Get regular exercise, but not within 3 to 4 hours of your bedtime. Alexis Meehan · Sleep on a comfortable pillow and mattress. · If watching the clock makes you anxious, turn it facing away from you so you cannot see the time. · If you worry when you lie down, start a worry book. Well before bedtime, write down your worries, and then set the book and your concerns aside. · Try meditation or other relaxation techniques before you go to bed. · If you cannot fall asleep, get up and go to another room until you feel sleepy. Do something relaxing. Repeat your bedtime routine before you go to bed again. · Make your house quiet and calm about an hour before bedtime. Turn down the lights, turn off the TV, log off the computer, and turn down the volume on music. This can help you relax after a busy day. Drowsy Driving: The [employer] cites drowsiness as a causing factor in more than 167,551 police reported crashes annually, resulting in 76,000 injuries and 1,500 deaths. Other surveys suggest 55% of people polled have driven while drowsy in the past year, 23% had fallen asleep but not crashed, 3% crashed, and 2% had and accident due to drowsy driving. Who is at risk? Young Drivers: One study of drowsy driving accidents states that 55% of the drivers were under 25 years. Of those, 75% were male. Shift Workers and Travelers: People who work overnight or travel across time zones frequently are at higher risk of experiencing Circadian Rhythm Disorders. They are trying to work and function when their body is programed to sleep. Sleep Deprived: Lack of sleep has a serious impact on your ability to pay attention or focus on a task. Consistently getting less than the average of 8 hours your body needs creates partial or cumulative sleep deprivation. Untreated Sleep Disorders: Sleep Apnea, Narcolepsy, R.L.S., and other sleep disorders (untreated) prevent a person from getting enough restful sleep. This leads to excessive daytime sleepiness and increases the risk for drowsy driving accidents by up to 7 times. Medications / Alcohol: Even over the counter medications can cause drowsiness. Medications that impair a drivers attention should have a warning label. Alcohol naturally makes you sleepy and on its own can cause accidents. Combined with excessive drowsiness its effects are amplified. Signs of Drowsy Driving: * You don't remember driving the last few miles * You may drift out of your mary * You are unable to focus and your thoughts wander * You may yawn more often than normal 
 * You have difficulty keeping your eyes open / nodding off * Missing traffic signs, speeding, or tailgating Prevention-  
Good sleep hygiene, lifestyle and behavioral choices have the most impact on drowsy driving. There is no substitute for sleep and the average person requires 8 hours nightly. If you find yourself driving drowsy, stop and sleep. Consider the sleep hygiene tips provided during your visit as well. Medication Refill Policy: Refills for all medications require 1 week advance notice.  Please have your pharmacy fax a refill request. We are unable to fax, or call in \"controled substance\" medications and you will need to pick these prescriptions up from our office. My[employer]Bung[employer]ow Activation Thank you for requesting access to [employer]. Please follow the instructions below to securely access and download your online medical record. Winning[employer]age allows you to send messages to your doctor, view your test results, renew your prescriptions, schedule appointments, and more. How Do I Sign Up? 1. In your internet browser, go to [email or web address]. 2. Click on the First Time User? Click Here link in the Sign In box. You will see the New Member Sign Up page. 3. Enter your WinningAdvantage Access Code exactly as it appears below. You will not need to use this code after youve completed the sign-up process. If you do not sign up before the expiration date, you must request a new code. Winning[employer]age Access Code: [email or web address] Expires: 2017  5:09 PM (This is the date your Winning[employer]age access code will ) 4. Enter the last four digits of your Social Security Number (xxxx) and Date of Birth (mm/dd/yyyy) as indicated and click Submit. You will be taken to the next sign-up page. 5. Create a WinningAdvantage ID. This will be your WinningAdvantage login ID and cannot be changed, so think of one that is secure and easy to remember. 6. Create a Winning[employer]vantage password. You can change your password at any time. 7. Enter your Password Reset Question and Answer. This can be used at a later time if you forget your password. 8. Enter your e-mail address. You will receive e-mail notification when new information is available in [address] 9. Click Sign Up. You can now view and download portions of your medical record. 10. Click the Download Summary menu link to download a portable copy of your medical information. Additional Information If you have questions, please call [phone].  Remember, [address] is NOT to be used for urgent needs. For medical emergencies, dial 911. Starting a Weight Loss Plan: After Your Visit Your Care Instructions If you are thinking about losing weight, it can be hard to know where to start. Your doctor can help you set up a weight loss plan that best meets your needs. You may want to take a class on nutrition or exercise, or join a weight loss support group. If you have questions about how to make changes to your eating or exercise habits, ask your doctor about seeing a registered dietitian or an exercise specialist. 
It can be a big challenge to lose weight. But you do not have to make huge changes at once. Make small changes, and stick with them. When those changes become habit, add a few more changes. If you do not think you are ready to make changes right now, try to pick a date in the future. Make an appointment to see your doctor to discuss whether the time is right for you to start a plan. Follow-up care is a key part of your treatment and safety. Be sure to make and go to all appointments, and call your doctor if you are having problems. It's also a good idea to know your test results and keep a list of the medicines you take. How can you care for yourself at home? · Set realistic goals. Many people expect to lose much more weight than is likely. A weight loss of 5% to 10% of your body weight may be enough to improve your health. · Get family and friends involved to provide support. Talk to them about why you are trying to lose weight, and ask them to help. They can help by participating in exercise and having meals with you, even if they may be eating something different. · Find what works best for you. If you do not have time or do not like to cook, a program that offers meal replacement bars or shakes may be better for you.  Or if you like to prepare meals, finding a plan that includes daily menus and recipes may be best. 
 · Ask your doctor about other health professionals who can help you achieve your weight loss goals. ¨ A dietitian can help you make healthy changes in your diet. ¨ An exercise specialist or  can help you develop a safe and effective exercise program. 
¨ A counselor or psychiatrist can help you cope with issues such as depression, anxiety, or family problems that can make it hard to focus on weight loss. · Consider joining a support group for people who are trying to lose weight. Your doctor can suggest groups in your area. Where can you learn more? Go to Matchpoint Careers.be Enter S960 in the search box to learn more about \"Starting a Weight Loss Plan: After Your Visit. \"  
© 6407-7742 Healthwise, Incorporated. Care instructions adapted under license by Abdelrahman Ruiz (which disclaims liability or warranty for this information). This care instruction is for use with your licensed healthcare professional. If you have questions about a medical condition or this instruction, always ask your healthcare professional. [address] any warranty or liability for your use of this information. Content Version: 8.4.63494; Last Revised: September 1, 2009 Introducing [facility]! Abdelrahman Ruiz introduces MetricStream patient portal. Now you can access parts of your medical record, email your doctor's office, and request medication refills online. 1. In your internet browser, go to [email or web address] 2. Click on the First Time User? Click Here link in the Sign In box. You will see the New Member Sign Up page. 3. Enter your MetricStream Access Code exactly as it appears below. You will not need to use this code after youve completed the sign-up process. If you do not sign up before the expiration date, you must request a new code. · Met[employer]Stream Access Code: [email or web address] Expires: 5/25/2017  5:09 PM 
 
 4. Enter the last four digits of your Social Security Number (xxxx) and Date of Birth (mm/dd/yyyy) as indicated and click Submit. You will be taken to the next sign-up page. 5. Create a NXVISION ID. This will be your NXVISION login ID and cannot be changed, so think of one that is secure and easy to remember. 6. Create a NXVISION password. You can change your password at any time. 7. Enter your Password Reset Question and Answer. This can be used at a later time if you forget your password. 8. Enter your e-mail address. You will receive e-mail notification when new information is available in [address] 9. Click Sign Up. You can now view and download portions of your medical record. 10. Click the Download Summary menu link to download a portable copy of your medical information. If you have questions, please visit the Frequently Asked Questions section of the NXVISION website. Remember, NXVISION is NOT to be used for urgent needs. For medical emergencies, dial 911. Now available from your iPhone and Android! Please provide this summary of care documentation to your next provider. Your primary care clinician is listed as Xander Bello. If you have any questions after today's visit, please call [phone].

## 2017-04-24 ENCOUNTER — TELEPHONE (OUTPATIENT)
Dept: SLEEP MEDICINE | Age: 67
End: 2017-04-24

## 2017-04-24 DIAGNOSIS — G47.33 OSA (OBSTRUCTIVE SLEEP APNEA): Primary | ICD-10-CM

## 2017-04-24 NOTE — TELEPHONE ENCOUNTER
Patient called requesting sleep study, he does  not want to [place] for sleep records requested from [facility]. Please advise.

## 2017-04-25 ENCOUNTER — HOSPITAL ENCOUNTER (OUTPATIENT)
Dept: SLEEP MEDICINE | Age: 67
Discharge: HOME | End: 2017-04-25
Payer: MEDICARE

## 2017-04-25 ENCOUNTER — OFFICE VISIT (OUTPATIENT)
Dept: SLEEP MEDICINE | Age: 67
End: 2017-04-25

## 2017-04-25 VITALS
WEIGHT: 208 LBS | OXYGEN SATURATION: 93 % | SYSTOLIC BLOOD PRESSURE: 146 MMHG | HEIGHT: 70 IN | DIASTOLIC BLOOD PRESSURE: 84 MMHG | BODY MASS INDEX: 29.78 KG/M2 | TEMPERATURE: 98.4 F | HEART RATE: 65 BPM

## 2017-04-25 DIAGNOSIS — G47.33 OBSTRUCTIVE SLEEP APNEA (ADULT) (PEDIATRIC): Primary | ICD-10-CM

## 2017-04-25 PROCEDURE — 95806 SLEEP STUDY UNATT&RESP EFFT: CPT | Performed by: INTERNAL MEDICINE

## 2017-04-26 ENCOUNTER — TELEPHONE (OUTPATIENT)
Dept: SLEEP MEDICINE | Age: 67
End: 2017-04-26

## 2017-04-26 DIAGNOSIS — G47.33 OSA (OBSTRUCTIVE SLEEP APNEA): Primary | ICD-10-CM

## 2017-04-26 NOTE — TELEPHONE ENCOUNTER
HSAT Returned    Date of Study: 4/25/17    The following information was gathered from the patients study log:    · Lights off: 10:10p  · Estimated sleep onset: 10:35p    · Awakened a total of 6 times  · The patient felt they slept 8.5 hours  · Patient took no sleep aid before starting the test  · Sleep quality was the same compared to a usual nights sleep. Further information provided: \"I've used a CPAP for about 15 years but my CPAP has not been working correctly lately. \"

## 2017-04-27 ENCOUNTER — DOCUMENTATION ONLY (OUTPATIENT)
Dept: SLEEP MEDICINE | Age: 67
End: 2017-04-27

## 2017-04-27 NOTE — PROGRESS NOTES
This note is being routed to NP Rush Jimenez. Sleep Medicine consult note and sleep study report in patient's chart for review.     Thank you for the referral.

## 2017-05-09 ENCOUNTER — DOCUMENTATION ONLY (OUTPATIENT)
Dept: SLEEP MEDICINE | Age: 67
End: 2017-05-09

## 2017-06-01 ENCOUNTER — TELEPHONE (OUTPATIENT)
Dept: SLEEP MEDICINE | Age: 67
End: 2017-06-01

## 2017-07-15 ENCOUNTER — APPOINTMENT (OUTPATIENT)
Dept: GENERAL RADIOLOGY | Age: 67
End: 2017-07-15
Attending: EMERGENCY MEDICINE
Payer: MEDICARE

## 2017-07-15 ENCOUNTER — HOSPITAL ENCOUNTER (EMERGENCY)
Age: 67
Discharge: HOME | End: 2017-07-15
Attending: EMERGENCY MEDICINE | Admitting: EMERGENCY MEDICINE
Payer: MEDICARE

## 2017-07-15 VITALS
HEIGHT: 70 IN | RESPIRATION RATE: 18 BRPM | HEART RATE: 64 BPM | TEMPERATURE: 98 F | OXYGEN SATURATION: 100 % | WEIGHT: 210 LBS | SYSTOLIC BLOOD PRESSURE: 143 MMHG | DIASTOLIC BLOOD PRESSURE: 79 MMHG | BODY MASS INDEX: 30.06 KG/M2

## 2017-07-15 DIAGNOSIS — E86.0 DEHYDRATION: ICD-10-CM

## 2017-07-15 DIAGNOSIS — R55 NEAR SYNCOPE: Primary | ICD-10-CM

## 2017-07-15 DIAGNOSIS — N28.9 ACUTE RENAL INSUFFICIENCY: ICD-10-CM

## 2017-07-15 LAB
ALBUMIN SERPL BCP-MCNC: 3.5 G/DL (ref 3.5–5)
ALBUMIN/GLOB SERPL: 1 {RATIO} (ref 1.1–2.2)
ALP SERPL-CCNC: 68 U/L (ref 45–117)
ALT SERPL-CCNC: 25 U/L (ref 12–78)
ANION GAP BLD CALC-SCNC: 6 MMOL/L (ref 5–15)
APTT PPP: 23.6 SEC (ref 22.1–32.5)
AST SERPL W P-5'-P-CCNC: 15 U/L (ref 15–37)
BASOPHILS # BLD AUTO: 0 K/UL (ref 0–0.1)
BASOPHILS # BLD: 0 % (ref 0–1)
BILIRUB SERPL-MCNC: 0.4 MG/DL (ref 0.2–1)
BUN SERPL-MCNC: 39 MG/DL (ref 6–20)
BUN/CREAT SERPL: 25 (ref 12–20)
CALCIUM SERPL-MCNC: 8.5 MG/DL (ref 8.5–10.1)
CHLORIDE SERPL-SCNC: 104 MMOL/L (ref 97–108)
CO2 SERPL-SCNC: 29 MMOL/L (ref 21–32)
CREAT SERPL-MCNC: 1.54 MG/DL (ref 0.7–1.3)
EOSINOPHIL # BLD: 0.2 K/UL (ref 0–0.4)
EOSINOPHIL NFR BLD: 2 % (ref 0–7)
ERYTHROCYTE [DISTWIDTH] IN BLOOD BY AUTOMATED COUNT: 13.2 % (ref 11.5–14.5)
ETHANOL SERPL-MCNC: <10 MG/DL
GLOBULIN SER CALC-MCNC: 3.5 G/DL (ref 2–4)
GLUCOSE SERPL-MCNC: 169 MG/DL (ref 65–100)
HCT VFR BLD AUTO: 42.4 % (ref 36.6–50.3)
HGB BLD-MCNC: 14.5 G/DL (ref 12.1–17)
INR PPP: 1.1 (ref 0.9–1.1)
LYMPHOCYTES # BLD AUTO: 18 % (ref 12–49)
LYMPHOCYTES # BLD: 1.7 K/UL (ref 0.8–3.5)
MAGNESIUM SERPL-MCNC: 2.1 MG/DL (ref 1.6–2.4)
MCH RBC QN AUTO: 31.2 PG (ref 26–34)
MCHC RBC AUTO-ENTMCNC: 34.2 G/DL (ref 30–36.5)
MCV RBC AUTO: 91.2 FL (ref 80–99)
MONOCYTES # BLD: 0.6 K/UL (ref 0–1)
MONOCYTES NFR BLD AUTO: 6 % (ref 5–13)
NEUTS SEG # BLD: 6.8 K/UL (ref 1.8–8)
NEUTS SEG NFR BLD AUTO: 74 % (ref 32–75)
PHOSPHATE SERPL-MCNC: 4.9 MG/DL (ref 2.6–4.7)
PLATELET # BLD AUTO: 227 K/UL (ref 150–400)
POTASSIUM SERPL-SCNC: 4 MMOL/L (ref 3.5–5.1)
PROT SERPL-MCNC: 7 G/DL (ref 6.4–8.2)
PROTHROMBIN TIME: 11.2 SEC (ref 9–11.1)
RBC # BLD AUTO: 4.65 M/UL (ref 4.1–5.7)
SODIUM SERPL-SCNC: 139 MMOL/L (ref 136–145)
THERAPEUTIC RANGE,PTTT: NORMAL SECS (ref 58–77)
TROPONIN I SERPL-MCNC: <0.04 NG/ML
WBC # BLD AUTO: 9.3 K/UL (ref 4.1–11.1)

## 2017-07-15 PROCEDURE — 96361 HYDRATE IV INFUSION ADD-ON: CPT

## 2017-07-15 PROCEDURE — 80307 DRUG TEST PRSMV CHEM ANLYZR: CPT | Performed by: EMERGENCY MEDICINE

## 2017-07-15 PROCEDURE — 80053 COMPREHEN METABOLIC PANEL: CPT | Performed by: EMERGENCY MEDICINE

## 2017-07-15 PROCEDURE — 85730 THROMBOPLASTIN TIME PARTIAL: CPT | Performed by: EMERGENCY MEDICINE

## 2017-07-15 PROCEDURE — 85610 PROTHROMBIN TIME: CPT | Performed by: EMERGENCY MEDICINE

## 2017-07-15 PROCEDURE — 84484 ASSAY OF TROPONIN QUANT: CPT | Performed by: EMERGENCY MEDICINE

## 2017-07-15 PROCEDURE — 74011250636 HC RX REV CODE- 250/636: Performed by: EMERGENCY MEDICINE

## 2017-07-15 PROCEDURE — 71010 XR CHEST PORT: CPT

## 2017-07-15 PROCEDURE — 36415 COLL VENOUS BLD VENIPUNCTURE: CPT | Performed by: EMERGENCY MEDICINE

## 2017-07-15 PROCEDURE — 96360 HYDRATION IV INFUSION INIT: CPT

## 2017-07-15 PROCEDURE — 93005 ELECTROCARDIOGRAM TRACING: CPT

## 2017-07-15 PROCEDURE — 84100 ASSAY OF PHOSPHORUS: CPT | Performed by: EMERGENCY MEDICINE

## 2017-07-15 PROCEDURE — 99285 EMERGENCY DEPT VISIT HI MDM: CPT

## 2017-07-15 PROCEDURE — 85025 COMPLETE CBC W/AUTO DIFF WBC: CPT | Performed by: EMERGENCY MEDICINE

## 2017-07-15 PROCEDURE — 83735 ASSAY OF MAGNESIUM: CPT | Performed by: EMERGENCY MEDICINE

## 2017-07-15 RX ADMIN — SODIUM CHLORIDE 1000 ML: 900 INJECTION, SOLUTION INTRAVENOUS at 19:34

## 2017-07-15 NOTE — ED TRIAGE NOTES
Pt arrives via EMS s/p syncopal episode. Pt states he was at a birthday party where he had a gin and tonic for the first time in 15-20 years. Pt states he started feeling dizzy and felt like he had to go to the bathroom. Pt had syncopal episode while the fire engine was present PTA.   Gluc 150 en route
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
## 2017-07-16 LAB
ATRIAL RATE: 61 BPM
CALCULATED P AXIS, ECG09: 9 DEGREES
CALCULATED R AXIS, ECG10: 0 DEGREES
CALCULATED T AXIS, ECG11: 7 DEGREES
DIAGNOSIS, 93000: NORMAL
P-R INTERVAL, ECG05: 174 MS
Q-T INTERVAL, ECG07: 402 MS
QRS DURATION, ECG06: 98 MS
QTC CALCULATION (BEZET), ECG08: 404 MS
VENTRICULAR RATE, ECG03: 61 BPM

## 2017-07-16 NOTE — DISCHARGE INSTRUCTIONS
Dehydration: Care Instructions  Your Care Instructions  Dehydration happens when your body loses too much fluid. This might happen when you do not drink enough water or you lose large amounts of fluids from your body because of diarrhea, vomiting, or sweating. Severe dehydration can be life-threatening. Water and minerals called electrolytes help put your body fluids back in balance. Learn the early signs of fluid loss, and drink more fluids to prevent dehydration. Follow-up care is a key part of your treatment and safety. Be sure to make and go to all appointments, and call your doctor if you are having problems. It's also a good idea to know your test results and keep a list of the medicines you take. How can you care for yourself at home? · To prevent dehydration, drink plenty of fluids, enough so that your urine is light yellow or clear like water. Choose water and other caffeine-free clear liquids until you feel better. If you have kidney, heart, or liver disease and have to limit fluids, talk with your doctor before you increase the amount of fluids you drink. · If you do not feel like eating or drinking, try taking small sips of water, sports drinks, or other rehydration drinks. · Get plenty of rest.  To prevent dehydration  · Add more fluids to your diet and daily routine, unless your doctor has told you not to. · During hot weather, drink more fluids. Drink even more fluids if you exercise a lot. Stay away from drinks with alcohol or caffeine. · Watch for the symptoms of dehydration. These include:  ¨ A dry, sticky mouth. ¨ Dark yellow urine, and not much of it. ¨ Dry and sunken eyes. ¨ Feeling very tired. · Learn what problems can lead to dehydration. These include:  ¨ Diarrhea, fever, and vomiting. ¨ Any illness with a fever, such as pneumonia or the flu. ¨ Activities that cause heavy sweating, such as endurance races and heavy outdoor work in hot or humid weather.   ¨ Alcohol or drug abuse or withdrawal.  ¨ Certain medicines, such as cold and allergy pills (antihistamines), diet pills (diuretics), and laxatives. ¨ Certain diseases, such as diabetes, cancer, and heart or kidney disease. When should you call for help? Call 911 anytime you think you may need emergency care. For example, call if:  · You passed out (lost consciousness). Call your doctor now or seek immediate medical care if:  · You are confused and cannot think clearly. · You are dizzy or lightheaded, or you feel like you may faint. · You have signs of needing more fluids. You have sunken eyes and a dry mouth, and you pass only a little dark urine. · You cannot keep fluids down. Watch closely for changes in your health, and be sure to contact your doctor if:  · You are not making tears. · Your skin is very dry and sags slowly back into place after you pinch it. · Your mouth and eyes are very dry. Where can you learn more? Go to http://martin-cassandra.info/. Enter W192 in the search box to learn more about \"Dehydration: Care Instructions. \"  Current as of: March 20, 2017  Content Version: 11.3  © 7042-3666 [employer]. Care instructions adapted under license by [employer] (which disclaims liability or warranty for this information). If you have questions about a medical condition or this instruction, always ask your healthcare professional. [place] [address] any warranty or liability for your use of this information. We hope that we have addressed all of your medical concerns. The examination and treatment you received in the Emergency Department were for an emergent problem and were not intended as complete care. It is important that you follow up with your healthcare provider(s) for ongoing care.  If your symptoms worsen or do not improve as expected, and you are unable to reach your usual health care provider(s), you should return to the Emergency Department. Today's healthcare is undergoing tremendous change, and patient satisfaction surveys are one of the many tools to assess the quality of medical care. You may receive a survey from the [facility] regarding your experience in the Emergency Department. I hope that your experience has been completely positive, particularly the medical care that I provided. As such, please participate in the survey; anything less than excellent does not meet my expectations or intentions. [facility] and [facility] participate in nationally recognized quality of care measures. If your blood pressure is greater than 120/80, as reported below, we urge that you seek medical care to address the potential of high blood pressure, commonly known as hypertension. Hypertension can be hereditary or can be caused by certain medical conditions, pain, stress, or \"white coat syndrome. \"       Please make an appointment with your health care provider(s) for follow up of your Emergency Department visit. VITALS:   Patient Vitals for the past 8 hrs:   Temp Pulse Resp BP SpO2   07/15/17 1932 - 63 16 115/66 96 %   07/15/17 1928 97.9 °F (36.6 °C) 63 18 110/70 97 %          Thank you for allowing us to provide you with medical care today. We realize that you have many choices for your emergency care needs. Please choose us in the future for any continued health care needs.       Clarita Hdz MD    [facility], [facility].   Office: [phone]            Recent Results (from the past 24 hour(s))   EKG, 12 LEAD, INITIAL    Collection Time: 07/15/17  7:21 PM   Result Value Ref Range    Ventricular Rate 61 BPM    Atrial Rate 61 BPM    P-R Interval 174 ms    QRS Duration 98 ms    Q-T Interval 402 ms    QTC Calculation (Bezet) 404 ms    Calculated P Axis 9 degrees    Calculated R Axis 0 degrees    Calculated T Axis 7 degrees    Diagnosis Sinus rhythm with occasional premature ventricular complexes  When compared with ECG of 07-DEC-2016 14:04,  premature ventricular complexes are now present     CBC WITH AUTOMATED DIFF    Collection Time: 07/15/17  7:24 PM   Result Value Ref Range    WBC 9.3 4.1 - 11.1 K/uL    RBC 4.65 4.10 - 5.70 M/uL    HGB 14.5 12.1 - 17.0 g/dL    HCT 42.4 36.6 - 50.3 %    MCV 91.2 80.0 - 99.0 FL    MCH 31.2 26.0 - 34.0 PG    MCHC 34.2 30.0 - 36.5 g/dL    RDW 13.2 11.5 - 14.5 %    PLATELET 907 926 - 879 K/uL    NEUTROPHILS 74 32 - 75 %    LYMPHOCYTES 18 12 - 49 %    MONOCYTES 6 5 - 13 %    EOSINOPHILS 2 0 - 7 %    BASOPHILS 0 0 - 1 %    ABS. NEUTROPHILS 6.8 1.8 - 8.0 K/UL    ABS. LYMPHOCYTES 1.7 0.8 - 3.5 K/UL    ABS. MONOCYTES 0.6 0.0 - 1.0 K/UL    ABS. EOSINOPHILS 0.2 0.0 - 0.4 K/UL    ABS. BASOPHILS 0.0 0.0 - 0.1 K/UL   METABOLIC PANEL, COMPREHENSIVE    Collection Time: 07/15/17  7:24 PM   Result Value Ref Range    Sodium 139 136 - 145 mmol/L    Potassium 4.0 3.5 - 5.1 mmol/L    Chloride 104 97 - 108 mmol/L    CO2 29 21 - 32 mmol/L    Anion gap 6 5 - 15 mmol/L    Glucose 169 (H) 65 - 100 mg/dL    BUN 39 (H) 6 - 20 MG/DL    Creatinine 1.54 (H) 0.70 - 1.30 MG/DL    BUN/Creatinine ratio 25 (H) 12 - 20      GFR est AA 55 (L) >60 ml/min/1.73m2    GFR est non-AA 45 (L) >60 ml/min/1.73m2    Calcium 8.5 8.5 - 10.1 MG/DL    Bilirubin, total 0.4 0.2 - 1.0 MG/DL    ALT (SGPT) 25 12 - 78 U/L    AST (SGOT) 15 15 - 37 U/L    Alk.  phosphatase 68 45 - 117 U/L    Protein, total 7.0 6.4 - 8.2 g/dL    Albumin 3.5 3.5 - 5.0 g/dL    Globulin 3.5 2.0 - 4.0 g/dL    A-G Ratio 1.0 (L) 1.1 - 2.2     ETHYL ALCOHOL    Collection Time: 07/15/17  7:24 PM   Result Value Ref Range    ALCOHOL(ETHYL),SERUM <10 <10 MG/DL   MAGNESIUM    Collection Time: 07/15/17  7:24 PM   Result Value Ref Range    Magnesium 2.1 1.6 - 2.4 mg/dL   PHOSPHORUS    Collection Time: 07/15/17  7:24 PM   Result Value Ref Range    Phosphorus 4.9 (H) 2.6 - 4.7 MG/DL   PROTHROMBIN TIME + INR    Collection Time: 07/15/17  7:24 PM   Result Value Ref Range    INR 1.1 0.9 - 1.1      Prothrombin time 11.2 (H) 9.0 - 11.1 sec   PTT    Collection Time: 07/15/17  7:24 PM   Result Value Ref Range    aPTT 23.6 22.1 - 32.5 sec    aPTT, therapeutic range     58.0 - 77.0 SECS   TROPONIN I    Collection Time: 07/15/17  7:24 PM   Result Value Ref Range    Troponin-I, Qt. <0.04 <0.05 ng/mL       Xr Chest Port    Result Date: 7/15/2017  EXAM:  XR CHEST PORT INDICATION:  Syncope COMPARISON:  12/7/2016 FINDINGS: A portable AP radiograph of the chest was obtained at 1936 hours. There is no pneumothorax or pleural effusion. The lungs are clear. The cardiac and mediastinal contours and pulmonary vascularity are unchanged, with normal cardiac size and minimal thoracic aortic tortuosity again shown. There is no hilar enlargement.      IMPRESSION: No acute findings

## 2017-07-16 NOTE — ED NOTES
Pt discharged from ED with follow up care instructions; pt verbalized understanding and has no questions at this time. VSS. NAD. No pain reported at discharge.  Pt ambulatory from ED with steady gait and brother present for tranpsort./

## 2017-07-19 ENCOUNTER — OFFICE VISIT (OUTPATIENT)
Dept: ENDOCRINOLOGY | Age: 67
End: 2017-07-19

## 2017-07-19 ENCOUNTER — HOSPITAL ENCOUNTER (OUTPATIENT)
Dept: LAB | Age: 67
Discharge: HOME | End: 2017-07-19
Payer: MEDICARE

## 2017-07-19 VITALS
OXYGEN SATURATION: 94 % | RESPIRATION RATE: 16 BRPM | WEIGHT: 201.5 LBS | BODY MASS INDEX: 28.85 KG/M2 | HEIGHT: 70 IN | HEART RATE: 63 BPM | SYSTOLIC BLOOD PRESSURE: 132 MMHG | TEMPERATURE: 97.2 F | DIASTOLIC BLOOD PRESSURE: 78 MMHG

## 2017-07-19 DIAGNOSIS — R63.4 WEIGHT LOSS: ICD-10-CM

## 2017-07-19 DIAGNOSIS — R55 VASOVAGAL SYNCOPE: ICD-10-CM

## 2017-07-19 DIAGNOSIS — N17.9 ACUTE RENAL FAILURE, UNSPECIFIED ACUTE RENAL FAILURE TYPE (HCC): ICD-10-CM

## 2017-07-19 DIAGNOSIS — I10 ESSENTIAL HYPERTENSION: ICD-10-CM

## 2017-07-19 DIAGNOSIS — E11.65 TYPE 2 DIABETES MELLITUS WITH HYPERGLYCEMIA, WITHOUT LONG-TERM CURRENT USE OF INSULIN (HCC): Primary | ICD-10-CM

## 2017-07-19 LAB
GLUCOSE POC: 143 MG/DL
HBA1C MFR BLD HPLC: 6.5 %

## 2017-07-19 PROCEDURE — 80048 BASIC METABOLIC PNL TOTAL CA: CPT

## 2017-07-19 PROCEDURE — 36415 COLL VENOUS BLD VENIPUNCTURE: CPT

## 2017-07-19 PROCEDURE — 84443 ASSAY THYROID STIM HORMONE: CPT

## 2017-07-19 NOTE — MR AVS SNAPSHOT
Visit Information Date & Time Provider Department Dept. Phone Encounter #  
 7/19/2017 10:00 AM Kalee Keen MD [facility] [phone] [id number] Follow-up Instructions Return if symptoms worsen or fail to improve. Your Appointments 7/27/2017  9:20 AM  
Any with Boni Meade MD  
[address] ([address]) Appt Note: 1st adherence fu, bring machine [address] [place] [id number] [address] [facility] [id number] Upcoming Health Maintenance Date Due Hepatitis C Screening 1950 FOOT EXAM Q1 7/16/1958 DTaP/Tdap/Td series (1 - Tdap) 7/16/1969 FOBT Q 1 YEAR AGE 50-75 7/16/1998 ZOSTER VACCINE AGE 60> 7/16/2008 Pneumococcal 65+ Low/Medium Risk (1 of 2 - PCV13) 7/16/2013 MEDICARE YEARLY EXAM 7/16/2013 HEMOGLOBIN A1C Q6M 8/17/2017 INFLUENZA AGE 9 TO ADULT 8/1/2017 MICROALBUMIN Q1 2/17/2018 LIPID PANEL Q1 2/17/2018 EYE EXAM RETINAL OR DILATED Q1 4/21/2018 GLAUCOMA SCREENING Q2Y 4/21/2019 Allergies as of 7/19/2017  Review Complete On: 7/19/2017 By: Kalee Keen MD  
  
 Severity Noted Reaction Type Reactions Erythromycin  05/12/2014    Nausea and Vomiting Current Immunizations  Never Reviewed No immunizations on file. Not reviewed this visit You Were Diagnosed With   
  
 Codes Comments Type 2 diabetes mellitus with hyperglycemia, without long-term current use of insulin (HCC)    -  Primary ICD-10-CM: E11.65 ICD-9-CM: 250.00, 790.29 Essential hypertension     ICD-10-CM: I10 
ICD-9-CM: 401.9 Vasovagal syncope     ICD-10-CM: R55 
ICD-9-CM: 780. 2 Vitals BP Pulse Temp Resp Height(growth percentile) Weight(growth percentile) 132/78 (BP 1 Location: Left arm, BP Patient Position: Sitting) 63 97.2 °F (36.2 °C) (Oral) 16 5' 10\" (1.778 m) 201 lb 8 oz (91.4 kg) SpO2 BMI Smoking Status 94% 28.91 kg/m2 Never Smoker Vitals History BMI and BSA Data Body Mass Index Body Surface Area  
 28.91 kg/m 2 2.12 m 2 Preferred Pharmacy Pharmacy Name Phone [facility] [address] Your Updated Medication List  
  
   
This list is accurate as of: 7/19/17 10:29 AM.  Always use your most recent med list.  
  
  
  
  
 losartan 25 mg tablet Commonly known as:  COZAAR Take 1 Tab by mouth daily. Follow-up Instructions Return if symptoms worsen or fail to improve. Introducing [facility]! Laquita Arreguin introduces [employer] patient portal. Now you can access parts of your medical record, email your doctor's office, and request medication refills online. 1. In your internet browser, go to [email or web address] 2. Click on the First Time User? Click Here link in the Sign In box. You will see the New Member Sign Up page. 3. Enter your [employer] Access Code exactly as it appears below. You will not need to use this code after youve completed the sign-up process. If you do not sign up before the expiration date, you must request a new code. · [employer] Access Code: [email or web address] Expires: 10/13/2017 10:25 PM 
 
4. Enter the last four digits of your Social Security Number (xxxx) and Date of Birth (mm/dd/yyyy) as indicated and click Submit. You will be taken to the next sign-up page. 5. Create a [employer] ID. This will be your [employer] login ID and cannot be changed, so think of one that is secure and easy to remember. 6. Create a [employer] password. You can change your password at any time. 7. Enter your Password Reset Question and Answer. This can be used at a later time if you forget your password. 8. Enter your e-mail address. You will receive e-mail notification when new information is available in [address] 9. Click Sign Up. You can now view and download portions of your medical record. 10. Click the Download Summary menu link to download a portable copy of your medical information. If you have questions, please visit the Frequently Asked Questions section of the [employer] website. Remember, [employer] is NOT to be used for urgent needs. For medical emergencies, dial 911. Now available from your iPhone and Android! Please provide this summary of care documentation to your next provider. If you have any questions after today's visit, please call [phone].

## 2017-07-19 NOTE — PROGRESS NOTES
Manuel Mejias is a 69 y.o. male here for   Chief Complaint   Patient presents with    Diabetes       Functional glucose monitor and record keeping system? - yes  Eye exam within last year? - yes  Foot exam within last year? - yes    1. Have you been to the ER, urgent care clinic since your last visit? Hospitalized since your last visit? - [facility] for syncopey on 07/15/2017     2. Have you seen or consulted any other health care providers outside of the [address] [place] [address] since your last visit?   Include any pap smears or colon screening.- no      Lab Results   Component Value Date/Time    Hemoglobin A1c 7.2 02/17/2017 03:42 PM    Hemoglobin A1c (POC) 6.8 11/23/2016 03:18 PM    Hemoglobin A1c, External 9.2 01/19/2016       Wt Readings from Last 3 Encounters:   07/19/17 201 lb 8 oz (91.4 kg)   07/15/17 210 lb (95.3 kg)   04/25/17 208 lb (94.3 kg)     Temp Readings from Last 3 Encounters:   07/19/17 97.2 °F (36.2 °C) (Oral)   07/15/17 98 °F (36.7 °C)   04/25/17 98.4 °F (36.9 °C)     BP Readings from Last 3 Encounters:   07/19/17 132/78   07/15/17 143/79   04/25/17 146/84     Pulse Readings from Last 3 Encounters:   07/19/17 63   07/15/17 64   04/25/17 65

## 2017-07-19 NOTE — PROGRESS NOTES
[facility]               Noel Wright MD        [address] [phone] Fax [phone]               Patient Information  Date:7/19/2017  Name : Bonnie Serrano 69 y.o.     YOB: 1950                 Chief Complaint   Patient presents with    Diabetes       History of Present Illness: Bonnie Serrano is a 69 y.o. male here for fu of  Type 2 Diabetes Mellitus. Type 2 Diabetes was diagnosed several years ago  . He was seeing an endocrinologist, Dr. Sanford Raza in [place]. He was on Metformin before, wanted to control it with diet. He has self-stopped Metformin. Blood glucose are reasonable. Recently, he was admitted to [facility] with syncope, felt to be vasovagal as well as dehydration. Creatinine was elevated. No new medications, no chest pain or shortness of breath, severe headaches. Reports unintentional weight loss. In April he was 208 pounds. No nervousness, shakiness, depression. Wt Readings from Last 3 Encounters:   07/19/17 201 lb 8 oz (91.4 kg)   07/15/17 210 lb (95.3 kg)   04/25/17 208 lb (94.3 kg)       BP Readings from Last 3 Encounters:   07/19/17 132/78   07/15/17 143/79   04/25/17 146/84           Past Medical History:   Diagnosis Date    Diabetes (HCC)     Type 2, diet controlled    Hypertension     Sleep apnea     cpap     Current Outpatient Prescriptions   Medication Sig    losartan (COZAAR) 25 mg tablet Take 1 Tab by mouth daily. No current facility-administered medications for this visit.       Allergies   Allergen Reactions    Erythromycin Nausea and Vomiting         Review of Systems:  -   - Gastrointestinal: no dysphagia ,  abdominal pain  - Musculoskeletal: no joint pains no  weakness  - Integumentary: no rashes  - Neurological: no numbness, tingling, no  headaches  - Psychiatric: no depression no  anxiety  - Endocrine: no heat or cold intolerance    Physical Examination:   Blood pressure 132/78, pulse 63, temperature 97.2 °F (36.2 °C), temperature source Oral, resp. rate 16, height 5' 10\" (1.778 m), weight 201 lb 8 oz (91.4 kg), SpO2 94 %. Estimated body mass index is 28.91 kg/(m^2) as calculated from the following:    Height as of this encounter: 5' 10\" (1.778 m). -   Weight as of this encounter: 201 lb 8 oz (91.4 kg). - General: pleasant, no distress, good eye contact  - HEENT: no pallor, no periorbital edema, EOMI  - Neck: supple, no thyromegaly, no nodules  - Cardiovascular: regular, normal rate, normal S1 and S2,   - Respiratory: clear to auscultation bilaterally  - Gastrointestinal: soft, nontender, nondistended,   - Neurological: alert and oriented  - Psychiatric: normal mood and affect  - Skin: color, texture, turgor normal.       Data Reviewed:     [] Glucose records reviewed. [] See glucose records for details (to be scanned). [] A1C  [] Reviewed labs     [facility] 8/16    Assessment/Plan:     1. Type 2 diabetes mellitus with hyperglycemia, without long-term current use of insulin ([facility]    2. Essential hypertension    3. Vasovagal syncope    4. Weight loss    5. Acute renal failure, unspecified acute renal failure type ([facility]        1. Type 2 Diabetes Mellitus with no nephropathy,neuropathy,retinopathy  Lab Results   Component Value Date/Time    Hemoglobin A1c 7.2 02/17/2017 03:42 PM    Hemoglobin A1c (POC) 6.5 07/19/2017 10:31 AM    Hemoglobin A1c, External 9.2 01/19/2016     Diet controlled   Refused Metformin XR    2. HTN : Losartan ,     3 Hernia s/p repair , he used to think abdominal pain was due to Metformin and stopped it , I think his pain was due to hernia     4. Obesity:Body mass index is 28.91 kg/(m^2). Discussed about the importance of exercise and carbohydrate portion control. ARF - hydration   Labs       Refer to  family practice for establishment of care      There are no Patient Instructions on file for this visit.   Follow-up Disposition:  Return if symptoms worsen or fail to improve. Thank you for allowing me to participate in the care of this patient.     Monica Barajas MD      Patient verbalized understanding

## 2017-07-20 LAB
BUN SERPL-MCNC: 23 MG/DL (ref 8–27)
BUN/CREAT SERPL: 22 (ref 10–24)
CALCIUM SERPL-MCNC: 9.5 MG/DL (ref 8.6–10.2)
CHLORIDE SERPL-SCNC: 99 MMOL/L (ref 96–106)
CO2 SERPL-SCNC: 24 MMOL/L (ref 18–29)
CREAT SERPL-MCNC: 1.06 MG/DL (ref 0.76–1.27)
GLUCOSE SERPL-MCNC: 123 MG/DL (ref 65–99)
POTASSIUM SERPL-SCNC: 4.8 MMOL/L (ref 3.5–5.2)
SODIUM SERPL-SCNC: 139 MMOL/L (ref 134–144)
TSH SERPL DL<=0.005 MIU/L-ACNC: 2.17 UIU/ML (ref 0.45–4.5)

## 2017-07-24 ENCOUNTER — TELEPHONE (OUTPATIENT)
Dept: ENDOCRINOLOGY | Age: 67
End: 2017-07-24

## 2017-07-24 NOTE — LETTER
7/31/2017 3:50 PM 
 
Mr. Gail Rose [address] [facility] Dear Mr Lizbeth Campoverde, 
 
Dr Sonia Jacobsen has reviewed your results stated your labs are normal. 
 
She also stated Dr Alyssia Damico can view your lab results as he is within [place]. If you have any question, feel free to call our office at [phone]. Sincerely, 
 
 
Velvet Peterson LPN 
CDE Nurse for Megan Craft MD

## 2017-07-24 NOTE — TELEPHONE ENCOUNTER
Patient would like a call with results of labs, last note and labs has been sent to Dr. Shivam Carpio.   Thank you

## 2017-07-25 ENCOUNTER — OFFICE VISIT (OUTPATIENT)
Dept: FAMILY MEDICINE CLINIC | Age: 67
End: 2017-07-25

## 2017-07-25 ENCOUNTER — HOSPITAL ENCOUNTER (OUTPATIENT)
Dept: LAB | Age: 67
Discharge: HOME | End: 2017-07-25
Payer: MEDICARE

## 2017-07-25 VITALS
HEIGHT: 70 IN | HEART RATE: 70 BPM | WEIGHT: 201 LBS | DIASTOLIC BLOOD PRESSURE: 84 MMHG | RESPIRATION RATE: 18 BRPM | TEMPERATURE: 98 F | SYSTOLIC BLOOD PRESSURE: 138 MMHG | OXYGEN SATURATION: 98 % | BODY MASS INDEX: 28.77 KG/M2

## 2017-07-25 DIAGNOSIS — Z71.89 ADVANCE CARE PLANNING: ICD-10-CM

## 2017-07-25 DIAGNOSIS — E11.65 TYPE 2 DIABETES MELLITUS WITH HYPERGLYCEMIA, WITHOUT LONG-TERM CURRENT USE OF INSULIN (HCC): ICD-10-CM

## 2017-07-25 DIAGNOSIS — I10 ESSENTIAL HYPERTENSION: ICD-10-CM

## 2017-07-25 DIAGNOSIS — N17.9 ACUTE RENAL FAILURE, UNSPECIFIED ACUTE RENAL FAILURE TYPE (HCC): ICD-10-CM

## 2017-07-25 DIAGNOSIS — Z00.00 ROUTINE GENERAL MEDICAL EXAMINATION AT A HEALTH CARE FACILITY: Primary | ICD-10-CM

## 2017-07-25 DIAGNOSIS — Z12.5 PROSTATE CANCER SCREENING: ICD-10-CM

## 2017-07-25 PROCEDURE — 84153 ASSAY OF PSA TOTAL: CPT

## 2017-07-25 NOTE — PROGRESS NOTES
Chief Complaint   Patient presents with   Sara Reshma [facility]    Complete Physical    Labs Only   Sara Justice Annual Wellness Visit    Diabetes       Gina Memos  7/25/2017  Provider:   Julio César:  Diabetes Report Card   1) Have you seen the eye doctor in past year?yes    2) How would you  rate your Diabetic Diet? yes   3) How well do you take care of your feet? yes   4) Do you keep your Primary Care Follow Up Appts? yes    5) Do you know your A1C goal?yes    6) Do you take your medications daily? yes    7) Do you check your blood sugars? yes    8) Have you gained weight?yes       9) Do you follow an exercise program?yes    10) Can you do better?yes      Lab Results   Component Value Date/Time    Cholesterol, total 139 02/17/2017 03:42 PM    HDL Cholesterol 36 02/17/2017 03:42 PM    LDL, calculated 92 02/17/2017 03:42 PM    Triglyceride 56 02/17/2017 03:42 PM     Lab Results   Component Value Date/Time    Hemoglobin A1c 7.2 02/17/2017 03:42 PM    Glucose 123 07/19/2017 10:45 AM    Glucose (POC) 117 12/14/2016 04:38 PM    Glucose  07/19/2017 10:31 AM    Microalb/Creat ratio (ug/mg creat.) 16.9 02/17/2017 03:42 PM    LDL, calculated 92 02/17/2017 03:42 PM    Creatinine 1.06 07/19/2017 10:45 AM    Hemoglobin A1c, External 9.2 01/19/2016   [facility]    Pt presents to the office for Establish care, CPE, labs, AWV, DM, Hep C, Dtap, zoster    1. Have you been to the ER, urgent care clinic since your last visit? Hospitalized since your last visit? No    2. Have you seen or consulted any other health care providers outside of the [address] [place] [address] since your last visit? Include any pap smears or colon screening. No      Medicare Wellness Exam:    Chief Complaint   Patient presents with    [facility]    Complete Physical    Labs Only    Annual Wellness Visit    Diabetes     he is a 69y.o. year old male who presents for evaluation for their Medicare Wellness Visit.     Fall Screen is completed and assessed=yes  Depression Screen is completed and assessed=yes  Medication list reviewed and adjusted for accuracy=yes  Immunizations reviewed and updated=yes  Health/Preventative Screenings reviewed and updated=yes  ADL Functions reviewed=yes    Patient Active Problem List    Diagnosis    Advance care planning     dwp lw      Acute renal failure ([facility] [address]    Type 2 diabetes mellitus with hyperglycemia, without long-term current use of insulin ([address]    Essential hypertension       Reviewed PmHx, RxHx, FmHx, SocHx, AllgHx and updated and dated in the chart. Review of Systems - negative except as listed above in the HPI    Objective:     Vitals:    07/25/17 0909   BP: 138/84   Pulse: 70   Resp: 18   Temp: 98 °F (36.7 °C)   TempSrc: Oral   SpO2: 98%   Weight: 201 lb (91.2 kg)   Height: 5' 10\" (1.778 m)     Physical Examination: General appearance - alert, well appearing, and in no distress  Eyes - pupils equal and reactive, extraocular eye movements intact  Ears - bilateral TM's and external ear canals normal  Nose - normal and patent, no erythema, discharge or polyps  Mouth - mucous membranes moist, pharynx normal without lesions  Neck - supple, no significant adenopathy  Chest - clear to auscultation, no wheezes, rales or rhonchi, symmetric air entry  Heart - normal rate, regular rhythm, normal S1, S2, no murmurs, rubs, clicks or gallops  Abdomen - soft, nontender, nondistended, no masses or organomegaly  Extremities - peripheral pulses normal, no pedal edema, no clubbing or cyanosis    Assessment/ Plan:   Scott Rde was seen today for establish care, complete physical, labs only, annual wellness visit and diabetes.     Diagnoses and all orders for this visit:    Routine general medical examination at a health care facility  -see below  -PSA check     Type 2 diabetes mellitus with hyperglycemia, without long-term current use of insulin ([address]  -seeing endocrine and at goal    Essential hypertension  -at goal    Acute renal failure, unspecified acute renal failure type ([facility] [address]  -labs per endo    Advance care planning  -I discussed with patient living will and gave a legal form for patient to fill out and bring back to the office.         -Pain evaluation performed in office=0  -Cognitive Screen performed in office=nl  -Depression Screen, Fall risks (by up and go test)  and ADL functionality were addressed  -Medication list updated and reviewed for any changes   -A comprehensive review of medical issues and a plan was formulated  -End of life planning was addressed with pt   -Health Screenings for preventions were addressed and a plan was formulated  -Shingles Vaccine was recommended  -Discussed with patient cancer risk factors and appropriate screenings for age  -Patient evaluated for colonoscopy and referred if needed per screeing criteria  -Labs from previous visits were discussed with patient   -Discussed with patient diet and exercise and formulated a plan as needed  -An Advanced care plan was developed with the patient.  -Alcohol screening performed and was negative    -Follow-up Disposition:  Return if symptoms worsen or fail to improve. I have discussed the diagnosis with the patient and the intended plan as seen in the above orders. The patient understands and agrees with the plan. The patient has received an after-visit summary and questions were answered concerning future plans. Medication Side Effects and Warnings were discussed with patien  Patient Labs were reviewed and or requested  Patient Past Records were reviewed and or requested    There are no Patient Instructions on file for this visit.       Royce Damon M.D.

## 2017-07-25 NOTE — MR AVS SNAPSHOT
Visit Information Date & Time Provider Department Dept. Phone Encounter #  
 7/25/2017  9:10 AM Harjeet Hoffmann MD [address] [facility] [phone] [id number] Follow-up Instructions Return if symptoms worsen or fail to improve. Your Appointments 7/27/2017  9:20 AM  
Any with Adryan Gutierrez MD  
[address] [place] [employer] ([address]) Appt Note: 1st adherence fu, bring machine [id number] [facility] [id number] [address] [facility] [phone] Upcoming Health Maintenance Date Due ZOSTER VACCINE AGE 60> 5/16/2008 Pneumococcal 65+ High/Highest Risk (1 of 2 - PCV13) 7/16/2013 MEDICARE YEARLY EXAM 7/16/2013 INFLUENZA AGE 9 TO ADULT 8/1/2017 HEMOGLOBIN A1C Q6M 1/19/2018 MICROALBUMIN Q1 2/17/2018 LIPID PANEL Q1 2/17/2018 EYE EXAM RETINAL OR DILATED Q1 4/21/2018 FOOT EXAM Q1 7/25/2018 GLAUCOMA SCREENING Q2Y 4/21/2019 COLONOSCOPY 7/7/2025 DTaP/Tdap/Td series (2 - Td) 7/25/2027 Allergies as of 7/25/2017  Review Complete On: 7/25/2017 By: Harjeet Hoffmann MD  
  
 Severity Noted Reaction Type Reactions Erythromycin  05/12/2014    Nausea and Vomiting Current Immunizations  Never Reviewed No immunizations on file. Not reviewed this visit You Were Diagnosed With   
  
 Codes Comments Routine general medical examination at a health care facility    -  Primary ICD-10-CM: Z00.00 ICD-9-CM: V70.0 Type 2 diabetes mellitus with hyperglycemia, without long-term current use of insulin (HCC)     ICD-10-CM: E11.65 ICD-9-CM: 250.00, 790.29 Essential hypertension     ICD-10-CM: I10 
ICD-9-CM: 401.9 Acute renal failure, unspecified acute renal failure type ([facility] [address]     ICD-10-CM: N17.9 ICD-9-CM: 689. 9 Advance care planning     ICD-10-CM: Z71.89 ICD-9-CM: V65.49 Vitals BP Pulse Temp Resp Height(growth percentile) Weight(growth percentile) 138/84 70 98 °F (36.7 °C) (Oral) 18 5' 10\" (1.778 m) 201 lb (91.2 kg) SpO2 BMI Smoking Status 98% 28.84 kg/m2 Never Smoker BMI and BSA Data Body Mass Index Body Surface Area  
 28.84 kg/m 2 2.12 m 2 Preferred Pharmacy Pharmacy Name Phone [facility] [address] [address] Your Updated Medication List  
  
   
This list is accurate as of: 7/25/17  9:32 AM.  Always use your most recent med list.  
  
  
  
  
 losartan 25 mg tablet Commonly known as:  COZAAR Take 1 Tab by mouth daily. Follow-up Instructions Return if symptoms worsen or fail to improve. Introducing [facility] SERVICES! Dear Ana M Curran: 
Thank you for requesting a [employer] account. Our records indicate that you already have an active [employer] account. You can access your account anytime at [email or web address] Did you know that you can access your hospital and ER discharge instructions at any time in [employer]? You can also review all of your test results from your hospital stay or ER visit. Additional Information If you have questions, please visit the Frequently Asked Questions section of the [employer] website at [email or web address] Remember, [employer] is NOT to be used for urgent needs. For medical emergencies, dial 911. Now available from your iPhone and Android! Please provide this summary of care documentation to your next provider. Your primary care clinician is listed as LINA MCKEON. If you have any questions after today's visit, please call [phone].

## 2017-07-26 LAB — PSA SERPL-MCNC: 1.1 NG/ML (ref 0–4)

## 2017-07-27 ENCOUNTER — OFFICE VISIT (OUTPATIENT)
Dept: SLEEP MEDICINE | Age: 67
End: 2017-07-27

## 2017-07-27 VITALS
HEART RATE: 64 BPM | SYSTOLIC BLOOD PRESSURE: 127 MMHG | HEIGHT: 70 IN | OXYGEN SATURATION: 94 % | TEMPERATURE: 98.2 F | BODY MASS INDEX: 28.92 KG/M2 | WEIGHT: 202 LBS | DIASTOLIC BLOOD PRESSURE: 71 MMHG

## 2017-07-27 DIAGNOSIS — E66.3 OVERWEIGHT (BMI 25.0-29.9): ICD-10-CM

## 2017-07-27 DIAGNOSIS — G47.33 OSA (OBSTRUCTIVE SLEEP APNEA): Primary | ICD-10-CM

## 2017-07-27 NOTE — MR AVS SNAPSHOT
Visit Information Date & Time Provider Department Dept. Phone Encounter #  
 7/27/2017  9:20 AM Agustin Huffman MD [facility] 53 [id number] Follow-up Instructions Return in about 1 year (around 7/27/2018), or if symptoms worsen or fail to improve. Follow-up and Disposition History Your Appointments 7/26/2018  9:20 AM  
Any with Agustin Huffman MD  
[address] ([facility]) Appt Note: 1 year CPAP follow up_ last seen 7/27/17 by Dr Idalmis Wilkinson  
 [address] [facility] [address] [id number] [address] [facility] [address] Upcoming Health Maintenance Date Due ZOSTER VACCINE AGE 60> 5/16/2008 Pneumococcal 65+ High/Highest Risk (1 of 2 - PCV13) 7/16/2013 INFLUENZA AGE 9 TO ADULT 8/1/2017 HEMOGLOBIN A1C Q6M 1/19/2018 MICROALBUMIN Q1 2/17/2018 LIPID PANEL Q1 2/17/2018 EYE EXAM RETINAL OR DILATED Q1 4/21/2018 FOOT EXAM Q1 7/25/2018 MEDICARE YEARLY EXAM 7/26/2018 GLAUCOMA SCREENING Q2Y 4/21/2019 COLONOSCOPY 7/7/2025 DTaP/Tdap/Td series (2 - Td) 7/25/2027 Allergies as of 7/27/2017  Review Complete On: 7/27/2017 By: Agustin Huffman MD  
  
 Severity Noted Reaction Type Reactions Erythromycin  05/12/2014    Nausea and Vomiting Current Immunizations  Never Reviewed No immunizations on file. Not reviewed this visit You Were Diagnosed With   
  
 Codes Comments JOVANY (obstructive sleep apnea)    -  Primary ICD-10-CM: G47.33 
ICD-9-CM: 327.23 Overweight (BMI 25.0-29. 9)     ICD-10-CM: Y33.7 ICD-9-CM: 278.02 Vitals BP Pulse Temp Height(growth percentile) Weight(growth percentile) SpO2  
 127/71 64 98.2 °F (36.8 °C) 5' 10\" (1.778 m) 202 lb (91.6 kg) 94% BMI Smoking Status 28.98 kg/m2 Never Smoker Vitals History BMI and BSA Data Body Mass Index Body Surface Area  
 28.98 kg/m 2 2.13 m 2 Preferred Pharmacy Pharmacy Name Phone [facility] [address] Your Updated Medication List  
  
   
This list is accurate as of: 7/27/17 10:24 AM.  Always use your most recent med list.  
  
  
  
  
 losartan 25 mg tablet Commonly known as:  COZAAR Take 1 Tab by mouth daily. Follow-up Instructions Return in about 1 year (around 7/27/2018), or if symptoms worsen or fail to improve. Patient Instructions [address] [place]. [address] [facility], [address] Tel.  [phone] Fax. [address] [facility] [address] [place], [address] [facility] Tel.  [phone] Fax. [phone] [address] [facility] Tel.  [phone] Fax. [phone] Learning About CPAP for Sleep Apnea What is CPAP? CPAP is a small machine that you use at home every night while you sleep. It increases air pressure in your throat to keep your airway open. When you have sleep apnea, this can help you sleep better so you feel much better. CPAP stands for \"continuous positive airway pressure. \" The CPAP machine will have one of the following: · A mask that covers your nose and mouth · Prongs that fit into your nose · A mask that covers your nose only, the most common type. This type is called NCPAP. The N stands for \"nasal.\" Why is it done? CPAP is usually the best treatment for obstructive sleep apnea. It is the first treatment choice and the most widely used. Your doctor may suggest CPAP if you have: · Moderate to severe sleep apnea. · Sleep apnea and coronary artery disease (CAD) or heart failure. How does it help? · CPAP can help you have more normal sleep, so you feel less sleepy and more alert during the daytime. · CPAP may help keep heart failure or other heart problems from getting worse. · NCPAP may help lower your blood pressure. · If you use CPAP, your bed partner may also sleep better because you are not snoring or restless. What are the side effects? Some people who use CPAP have: · A dry or stuffy nose and a sore throat. · Irritated skin on the face. · Sore eyes. · Bloating. If you have any of these problems, work with your doctor to fix them. Here are some things you can try: · Be sure the mask or nasal prongs fit well. · See if your doctor can adjust the pressure of your CPAP. · If your nose is dry, try a humidifier. · If your nose is runny or stuffy, try decongestant medicine or a steroid nasal spray. If these things do not help, you might try a different type of machine. Some machines have air pressure that adjusts on its own. Others have air pressures that are different when you breathe in than when you breathe out. This may reduce discomfort caused by too much pressure in your nose. Where can you learn more? Go to [employer]be Enter B111 in the search box to learn more about \"Learning About CPAP for Sleep Apnea. \"  
© 9554-8590 Healthwise, Incorporated. Care instructions adapted under license by [facility] (which disclaims liability or warranty for this information). This care instruction is for use with your licensed healthcare professional. If you have questions about a medical condition or this instruction, always ask your healthcare professional. [place] [address] any warranty or liability for your use of this information. Content Version: 7.6.57247; Last Revised: January 11, 2010 PROPER SLEEP HYGIENE What to avoid · Do not have drinks with caffeine, such as coffee or black tea, for 8 hours before bed. · Do not smoke or use other types of tobacco near bedtime. Nicotine is a stimulant and can keep you awake.  
· Avoid drinking alcohol late in the evening, because it can cause you to wake in the middle of the night. · Do not eat a big meal close to bedtime. If you are hungry, eat a light snack. · Do not drink a lot of water close to bedtime, because the need to urinate may wake you up during the night. · Do not read or watch TV in bed. Use the bed only for sleeping and sexual activity. What to try · Go to bed at the same time every night, and wake up at the same time every morning. Do not take naps during the day. · Keep your bedroom quiet, dark, and cool. · Get regular exercise, but not within 3 to 4 hours of your bedtime. Carlitos Seay · Sleep on a comfortable pillow and mattress. · If watching the clock makes you anxious, turn it facing away from you so you cannot see the time. · If you worry when you lie down, start a worry book. Well before bedtime, write down your worries, and then set the book and your concerns aside. · Try meditation or other relaxation techniques before you go to bed. · If you cannot fall asleep, get up and go to another room until you feel sleepy. Do something relaxing. Repeat your bedtime routine before you go to bed again. · Make your house quiet and calm about an hour before bedtime. Turn down the lights, turn off the TV, log off the computer, and turn down the volume on music. This can help you relax after a busy day. Drowsy Driving: The [employer] cites drowsiness as a causing factor in more than 160,642 police reported crashes annually, resulting in 76,000 injuries and 1,500 deaths. Other surveys suggest 55% of people polled have driven while drowsy in the past year, 23% had fallen asleep but not crashed, 3% crashed, and 2% had and accident due to drowsy driving. Who is at risk? Young Drivers: One study of drowsy driving accidents states that 55% of the drivers were under 25 years. Of those, 75% were male.   
Shift Workers and Travelers: People who work overnight or travel across time zones frequently are at higher risk of experiencing Circadian Rhythm Disorders. They are trying to work and function when their body is programed to sleep. Sleep Deprived: Lack of sleep has a serious impact on your ability to pay attention or focus on a task. Consistently getting less than the average of 8 hours your body needs creates partial or cumulative sleep deprivation. Untreated Sleep Disorders: Sleep Apnea, Narcolepsy, R.L.S., and other sleep disorders (untreated) prevent a person from getting enough restful sleep. This leads to excessive daytime sleepiness and increases the risk for drowsy driving accidents by up to 7 times. Medications / Alcohol: Even over the counter medications can cause drowsiness. Medications that impair a drivers attention should have a warning label. Alcohol naturally makes you sleepy and on its own can cause accidents. Combined with excessive drowsiness its effects are amplified. Signs of Drowsy Driving: * You don't remember driving the last few miles * You may drift out of your mary * You are unable to focus and your thoughts wander * You may yawn more often than normal 
 * You have difficulty keeping your eyes open / nodding off * Missing traffic signs, speeding, or tailgating Prevention-  
Good sleep hygiene, lifestyle and behavioral choices have the most impact on drowsy driving. There is no substitute for sleep and the average person requires 8 hours nightly. If you find yourself driving drowsy, stop and sleep. Consider the sleep hygiene tips provided during your visit as well. Medication Refill Policy: Refills for all medications require 1 week advance notice. Please have your pharmacy fax a refill request. We are unable to fax, or call in \"controled substance\" medications and you will need to pick these prescriptions up from our office. [employer] Activation Thank you for requesting access to [employer].  Please follow the instructions below to securely access and download your online medical record. [employer] allows you to send messages to your doctor, view your test results, renew your prescriptions, schedule appointments, and more. How Do I Sign Up? 1. In your internet browser, go to [email or web address]. 2. Click on the First Time User? Click Here link in the Sign In box. You will see the New Member Sign Up page. 3. Enter your [employer] Access Code exactly as it appears below. You will not need to use this code after youve completed the sign-up process. If you do not sign up before the expiration date, you must request a new code. MyChart Access Code: Activation code not generated Current [employer] Status: Active (This is the date your [employer] access code will ) 4. Enter the last four digits of your Social Security Number (xxxx) and Date of Birth (mm/dd/yyyy) as indicated and click Submit. You will be taken to the next sign-up page. 5. Create a [employer] ID. This will be your [employer] login ID and cannot be changed, so think of one that is secure and easy to remember. 6. Create a [employer] password. You can change your password at any time. 7. Enter your Password Reset Question and Answer. This can be used at a later time if you forget your password. 8. Enter your e-mail address. You will receive e-mail notification when new information is available in [address] 9. Click Sign Up. You can now view and download portions of your medical record. 10. Click the Download Summary menu link to download a portable copy of your medical information. Additional Information If you have questions, please call [phone]. Remember, [employer] is NOT to be used for urgent needs. For medical emergencies, dial 911. Starting a Weight Loss Plan: After Your Visit Your Care Instructions If you are thinking about losing weight, it can be hard to know where to start. Your doctor can help you set up a weight loss plan that best meets your needs. You may want to take a class on nutrition or exercise, or join a weight loss support group. If you have questions about how to make changes to your eating or exercise habits, ask your doctor about seeing a registered dietitian or an exercise specialist. 
It can be a big challenge to lose weight. But you do not have to make huge changes at once. Make small changes, and stick with them. When those changes become habit, add a few more changes. If you do not think you are ready to make changes right now, try to pick a date in the future. Make an appointment to see your doctor to discuss whether the time is right for you to start a plan. Follow-up care is a key part of your treatment and safety. Be sure to make and go to all appointments, and call your doctor if you are having problems. It's also a good idea to know your test results and keep a list of the medicines you take. How can you care for yourself at home? · Set realistic goals. Many people expect to lose much more weight than is likely. A weight loss of 5% to 10% of your body weight may be enough to improve your health. · Get family and friends involved to provide support. Talk to them about why you are trying to lose weight, and ask them to help. They can help by participating in exercise and having meals with you, even if they may be eating something different. · Find what works best for you. If you do not have time or do not like to cook, a program that offers meal replacement bars or shakes may be better for you. Or if you like to prepare meals, finding a plan that includes daily menus and recipes may be best. 
· Ask your doctor about other health professionals who can help you achieve your weight loss goals. ¨ A dietitian can help you make healthy changes in your diet.  
¨ An exercise specialist or  can help you develop a safe and effective exercise program. 
¨ A counselor or psychiatrist can help you cope with issues such as depression, anxiety, or family problems that can make it hard to focus on weight loss. · Consider joining a support group for people who are trying to lose weight. Your doctor can suggest groups in your area. Where can you learn more? Go to [employer].be Enter H898 in the search box to learn more about \"Starting a Weight Loss Plan: After Your Visit. \"  
© 0319-5254 Healthwise, Incorporated. Care instructions adapted under license by [employer] (which disclaims liability or warranty for this information). This care instruction is for use with your licensed healthcare professional. If you have questions about a medical condition or this instruction, always ask your healthcare professional. [address] any warranty or liability for your use of this information. Content Version: 6.0.37446; Last Revised: September 1, 2009 Introducing [facility] SERVICES! Dear Cyn Elise: 
Thank you for requesting a [employer] account. Our records indicate that you already have an active [employer] account. You can access your account anytime at [email or web address] Did you know that you can access your hospital and ER discharge instructions at any time in [employer]? You can also review all of your test results from your hospital stay or ER visit. Additional Information If you have questions, please visit the Frequently Asked Questions section of the [employer] website at [email or web address] Remember, [employer] is NOT to be used for urgent needs. For medical emergencies, dial 911. Now available from your iPhone and Android! Please provide this summary of care documentation to your next provider. Your primary care clinician is listed as LINA MCKEON.  If you have any questions after today's visit, please call [phone].

## 2017-07-27 NOTE — PROGRESS NOTES
[address] [facility] [place]. [place], [address]  Tel.  [phone]  Fax. 100 [facility] 60  [place], [address] [facility]  Tel.  [phone]  Fax. [phone] [address]  Tel.  [phone]  Fax. [phone]     S>Bryan Duarte is a 69 y.o. male seen for a positive airway pressure follow-up. He reports no problems using the device. The following problems are identified:    Drowsiness no Problems exhaling no   Snoring no Forget to put on no   Mask Comfortable yes Can't fall asleep no   Dry Mouth no Mask falls off no   Air Leaking no Frequent awakenings no         He admits that his sleep has improved. Therapy Apnea Index averaged over PAP use: 3 /hr which reflects improved sleep breathing condition. Allergies   Allergen Reactions    Erythromycin Nausea and Vomiting       He has a current medication list which includes the following prescription(s): losartan. Michaelyn Daily He  has a past medical history of Diabetes ([address] Headache; Hypertension; and Sleep apnea. He also has no past medical history of Anemia; Arrhythmia; Arthritis; Asthma; Autoimmune disease ([address] CAD (coronary artery disease); Calculus of kidney; Cancer ([address] Chronic kidney disease; Chronic obstructive pulmonary disease ([address] Chronic pain; Congestive heart failure ([address] Contact dermatitis and other eczema, due to unspecified cause; Depression; GERD (gastroesophageal reflux disease); Hypercholesterolemia; Liver disease; Malignant hyperthermia due to anesthesia; Psychotic disorder; PUD (peptic ulcer disease); Seizures ([address] Stroke [facility]); Thromboembolus ([address] Thyroid disease; or Trauma. [place] Sleepiness Score: 7   and Modified F.O.S.Q. Score Total / 2: 16   which reflect improved sleep quality over therapy time.     O>    Visit Vitals    /71    Pulse 64    Temp 98.2 °F (36.8 °C)    Ht 5' 10\" (1.778 m)    Wt 202 lb (91.6 kg)    SpO2 94%    BMI 28.98 kg/m2 General:   Alert, oriented, not in distress   Neck:   No JVD    Chest/Lungs:  symetrical lung expansion , no accessory muscle use    Extremities:  no obvious rashes , negative edema    Neuro:  No focal deficits ; No obvious tremor    Psych:  Normal affect ,  Normal countenance ;           A>    ICD-10-CM ICD-9-CM    1. JOVANY (obstructive sleep apnea) G47.33 327.23    2. Overweight (BMI 25.0-29. 9) E66.3 278.02      AHI = 6 (2017). On CPAP :  5-15 cmH2O. Compliant:      yes    Therapeutic Response:  Positive    P>    * PAP therapy is effective and remains necessary treatment for sleep apnea    * Follow-up Disposition:  Return in about 1 year (around 7/27/2018), or if symptoms worsen or fail to improve. * He was asked to contact our office for any problems regarding PAP therapy. * Counseling was provided regarding the importance of regular PAP use and on proper sleep hygiene and safe driving. * Re-enforced proper and regular cleaning for the device. I have reviewed/discussed the above normal BMI with the patient. I have recommended the following interventions: dietary management education, guidance, and counseling . Trudy Reynaga Thank you for allowing us to participate in your patient's medical care.     Kate Rojas M.D.  (electronically signed)  Diplomate in Sleep Medicine, [facility]

## 2017-07-27 NOTE — PATIENT INSTRUCTIONS
[address] [facility] [place]. [place], [address]  Tel.  [phone]  Fax. 100 [facility] 60  [place], [address] [facility]  Tel.  [phone]  Fax. [phone] [address]  Tel.  [phone]  Fax. [phone]     Learning About CPAP for Sleep Apnea  What is CPAP? CPAP is a small machine that you use at home every night while you sleep. It increases air pressure in your throat to keep your airway open. When you have sleep apnea, this can help you sleep better so you feel much better. CPAP stands for \"continuous positive airway pressure. \"  The CPAP machine will have one of the following:  · A mask that covers your nose and mouth  · Prongs that fit into your nose  · A mask that covers your nose only, the most common type. This type is called NCPAP. The N stands for \"nasal.\"  Why is it done? CPAP is usually the best treatment for obstructive sleep apnea. It is the first treatment choice and the most widely used. Your doctor may suggest CPAP if you have:  · Moderate to severe sleep apnea. · Sleep apnea and coronary artery disease (CAD) or heart failure. How does it help? · CPAP can help you have more normal sleep, so you feel less sleepy and more alert during the daytime. · CPAP may help keep heart failure or other heart problems from getting worse. · NCPAP may help lower your blood pressure. · If you use CPAP, your bed partner may also sleep better because you are not snoring or restless. What are the side effects? Some people who use CPAP have:  · A dry or stuffy nose and a sore throat. · Irritated skin on the face. · Sore eyes. · Bloating. If you have any of these problems, work with your doctor to fix them. Here are some things you can try:  · Be sure the mask or nasal prongs fit well. · See if your doctor can adjust the pressure of your CPAP. · If your nose is dry, try a humidifier.   · If your nose is runny or stuffy, try decongestant medicine or a steroid nasal spray. If these things do not help, you might try a different type of machine. Some machines have air pressure that adjusts on its own. Others have air pressures that are different when you breathe in than when you breathe out. This may reduce discomfort caused by too much pressure in your nose. Where can you learn more? Go to Picaboo.be  Enter Abril Weston in the search box to learn more about \"Learning About CPAP for Sleep Apnea. \"   © 8173-5345 Healthwise, Incorporated. Care instructions adapted under license by [facility] [employer] (which disclaims liability or warranty for this information). This care instruction is for use with your licensed healthcare professional. If you have questions about a medical condition or this instruction, always ask your healthcare professional. [address] any warranty or liability for your use of this information. Content Version: 0.1.16840; Last Revised: January 11, 2010  PROPER SLEEP HYGIENE    What to avoid  · Do not have drinks with caffeine, such as coffee or black tea, for 8 hours before bed. · Do not smoke or use other types of tobacco near bedtime. Nicotine is a stimulant and can keep you awake. · Avoid drinking alcohol late in the evening, because it can cause you to wake in the middle of the night. · Do not eat a big meal close to bedtime. If you are hungry, eat a light snack. · Do not drink a lot of water close to bedtime, because the need to urinate may wake you up during the night. · Do not read or watch TV in bed. Use the bed only for sleeping and sexual activity. What to try  · Go to bed at the same time every night, and wake up at the same time every morning. Do not take naps during the day. · Keep your bedroom quiet, dark, and cool. · Get regular exercise, but not within 3 to 4 hours of your bedtime. .  · Sleep on a comfortable pillow and mattress.   · If watching the clock makes you anxious, turn it facing away from you so you cannot see the time. · If you worry when you lie down, start a worry book. Well before bedtime, write down your worries, and then set the book and your concerns aside. · Try meditation or other relaxation techniques before you go to bed. · If you cannot fall asleep, get up and go to another room until you feel sleepy. Do something relaxing. Repeat your bedtime routine before you go to bed again. · Make your house quiet and calm about an hour before bedtime. Turn down the lights, turn off the TV, log off the computer, and turn down the volume on music. This can help you relax after a busy day. Drowsy Driving: The [employer] cites drowsiness as a causing factor in more than 920,092 police reported crashes annually, resulting in 76,000 injuries and 1,500 deaths. Other surveys suggest 55% of people polled have driven while drowsy in the past year, 23% had fallen asleep but not crashed, 3% crashed, and 2% had and accident due to drowsy driving. Who is at risk? Young Drivers: One study of drowsy driving accidents states that 55% of the drivers were under 25 years. Of those, 75% were male. Shift Workers and Travelers: People who work overnight or travel across time zones frequently are at higher risk of experiencing Circadian Rhythm Disorders. They are trying to work and function when their body is programed to sleep. Sleep Deprived: Lack of sleep has a serious impact on your ability to pay attention or focus on a task. Consistently getting less than the average of 8 hours your body needs creates partial or cumulative sleep deprivation. Untreated Sleep Disorders: Sleep Apnea, Narcolepsy, R.L.S., and other sleep disorders (untreated) prevent a person from getting enough restful sleep. This leads to excessive daytime sleepiness and increases the risk for drowsy driving accidents by up to 7 times.   Medications / Alcohol: Even over the counter medications can cause drowsiness. Medications that impair a drivers attention should have a warning label. Alcohol naturally makes you sleepy and on its own can cause accidents. Combined with excessive drowsiness its effects are amplified. Signs of Drowsy Driving:   * You don't remember driving the last few miles   * You may drift out of your mary   * You are unable to focus and your thoughts wander   * You may yawn more often than normal   * You have difficulty keeping your eyes open / nodding off   * Missing traffic signs, speeding, or tailgating  Prevention-   Good sleep hygiene, lifestyle and behavioral choices have the most impact on drowsy driving. There is no substitute for sleep and the average person requires 8 hours nightly. If you find yourself driving drowsy, stop and sleep. Consider the sleep hygiene tips provided during your visit as well. Medication Refill Policy: Refills for all medications require 1 week advance notice. Please have your pharmacy fax a refill request. We are unable to fax, or call in \"controled substance\" medications and you will need to pick these prescriptions up from our office. [employer] Activation    Thank you for requesting access to [employer]. Please follow the instructions below to securely access and download your online medical record. [employer] allows you to send messages to your doctor, view your test results, renew your prescriptions, schedule appointments, and more. How Do I Sign Up? 1. In your internet browser, go to [email or web address]. 2. Click on the First Time User? Click Here link in the Sign In box. You will see the New Member Sign Up page. 3. Enter your [employer] Access Code exactly as it appears below. You will not need to use this code after youve completed the sign-up process. If you do not sign up before the expiration date, you must request a new code. [employer] Access Code:  Activation code not generated  Current Link [facility] Status: Active (This is the date your Link [facility] access code will )    4. Enter the last four digits of your Social Security Number (xxxx) and Date of Birth (mm/dd/yyyy) as indicated and click Submit. You will be taken to the next sign-up page. 5. Create a Link [facility] ID. This will be your Link [facility] login ID and cannot be changed, so think of one that is secure and easy to remember. 6. Create a Link [facility] password. You can change your password at any time. 7. Enter your Password Reset Question and Answer. This can be used at a later time if you forget your password. 8. Enter your e-mail address. You will receive e-mail notification when new information is available in [address] 9. Click Sign Up. You can now view and download portions of your medical record. 10. Click the Download Summary menu link to download a portable copy of your medical information. Additional Information    If you have questions, please call [phone]. Remember, [employer] [facility] is NOT to be used for urgent needs. For medical emergencies, dial 911. Starting a Weight Loss Plan: After Your Visit  Your Care Instructions  If you are thinking about losing weight, it can be hard to know where to start. Your doctor can help you set up a weight loss plan that best meets your needs. You may want to take a class on nutrition or exercise, or join a weight loss support group. If you have questions about how to make changes to your eating or exercise habits, ask your doctor about seeing a registered dietitian or an exercise specialist.  It can be a big challenge to lose weight. But you do not have to make huge changes at once. Make small changes, and stick with them. When those changes become habit, add a few more changes. If you do not think you are ready to make changes right now, try to pick a date in the future.  Make an appointment to see your doctor to discuss whether the time is right for you to start a plan.  Follow-up care is a key part of your treatment and safety. Be sure to make and go to all appointments, and call your doctor if you are having problems. It's also a good idea to know your test results and keep a list of the medicines you take. How can you care for yourself at home? · Set realistic goals. Many people expect to lose much more weight than is likely. A weight loss of 5% to 10% of your body weight may be enough to improve your health. · Get family and friends involved to provide support. Talk to them about why you are trying to lose weight, and ask them to help. They can help by participating in exercise and having meals with you, even if they may be eating something different. · Find what works best for you. If you do not have time or do not like to cook, a program that offers meal replacement bars or shakes may be better for you. Or if you like to prepare meals, finding a plan that includes daily menus and recipes may be best.  · Ask your doctor about other health professionals who can help you achieve your weight loss goals. ¨ A dietitian can help you make healthy changes in your diet. ¨ An exercise specialist or  can help you develop a safe and effective exercise program.  ¨ A counselor or psychiatrist can help you cope with issues such as depression, anxiety, or family problems that can make it hard to focus on weight loss. · Consider joining a support group for people who are trying to lose weight. Your doctor can suggest groups in your area. Where can you learn more? Go to Melophone.be  Enter U357 in the search box to learn more about \"Starting a Weight Loss Plan: After Your Visit. \"   © 5585-9917 Healthwise, Incorporated. Care instructions adapted under license by Micheal Arguello (which disclaims liability or warranty for this information).  This care instruction is for use with your licensed healthcare professional. If you have questions about a medical condition or this instruction, always ask your healthcare professional. [place] [address] any warranty or liability for your use of this information.   Content Version: 3.4.27161; Last Revised: September 1, 2009

## 2017-07-28 ENCOUNTER — TELEPHONE (OUTPATIENT)
Dept: SLEEP MEDICINE | Age: 67
End: 2017-07-28

## 2017-07-28 NOTE — TELEPHONE ENCOUNTER
Patient actually requested to increase humidity for excessive dryness. Humidification increased to 3 via modem.

## 2017-07-28 NOTE — TELEPHONE ENCOUNTER
Patient called stating he was trying to increase the pressure on his CPAP device and the machine locked while he was doing so. Per Dori Lazo will be able to change pressure for him. Patient would like pressure increased.  Thank you

## 2017-09-18 ENCOUNTER — DOCUMENTATION ONLY (OUTPATIENT)
Dept: SLEEP MEDICINE | Age: 67
End: 2017-09-18

## 2017-09-20 ENCOUNTER — TELEPHONE (OUTPATIENT)
Dept: SLEEP MEDICINE | Age: 67
End: 2017-09-20

## 2017-09-20 NOTE — TELEPHONE ENCOUNTER
Patient called to state that he encountered problem with cpap machine when he was out out town. It gave error message and machine did not start. He called [address] did try to help remotely but was not able to. When he came back, he went to [facility] and swapped with the new machine. He was concerned if that would happen again in future. What are his options. I called [facility] and spoke with Cyrus Badillo. She explained the options when patients are out of town, is:  Call DME, technologist will try to trouble shoot the problem remotely. If that would fail, patient have the option to either ship the device (cost will be paid by the patient) and if that would not be possible, then they would have to bring machine when they return back. She also suggested that there are travel CPAP available, however, insurance would not cover the cost.    I called patient and he was busy on the other line and said he will call back.

## 2017-12-21 ENCOUNTER — OFFICE VISIT (OUTPATIENT)
Dept: ENDOCRINOLOGY | Age: 67
End: 2017-12-21

## 2017-12-21 VITALS
TEMPERATURE: 98.2 F | BODY MASS INDEX: 30.34 KG/M2 | SYSTOLIC BLOOD PRESSURE: 167 MMHG | HEIGHT: 70 IN | WEIGHT: 211.9 LBS | OXYGEN SATURATION: 93 % | RESPIRATION RATE: 16 BRPM | HEART RATE: 82 BPM | DIASTOLIC BLOOD PRESSURE: 92 MMHG

## 2017-12-21 DIAGNOSIS — E11.65 TYPE 2 DIABETES MELLITUS WITH HYPERGLYCEMIA, WITHOUT LONG-TERM CURRENT USE OF INSULIN (HCC): Primary | ICD-10-CM

## 2017-12-21 DIAGNOSIS — I10 ESSENTIAL HYPERTENSION: ICD-10-CM

## 2017-12-21 LAB
GLUCOSE POC: 126 MG/DL
HBA1C MFR BLD HPLC: 6.3 %

## 2017-12-21 RX ORDER — LOSARTAN POTASSIUM 25 MG/1
TABLET ORAL
Qty: 30 TAB | Refills: 5 | Status: CANCELLED | OUTPATIENT
Start: 2017-12-21

## 2017-12-21 RX ORDER — LOSARTAN POTASSIUM 50 MG/1
50 TABLET ORAL DAILY
Qty: 90 TAB | Refills: 3 | Status: SHIPPED | OUTPATIENT
Start: 2017-12-21 | End: 2019-06-11 | Stop reason: SDUPTHER

## 2017-12-21 NOTE — MR AVS SNAPSHOT
Visit Information Date & Time Provider Department Dept. Phone Encounter #  
 12/21/2017 11:30 AM Sherren Fischer, MD [facility] [phone] [id number] Follow-up Instructions Return in about 6 months (around 6/21/2018). Your Appointments 7/24/2018  9:20 AM  
Any with Kris Martin MD  
[address] [employer] [facility] ([facility]) Appt Note: 1 year CPAP follow up_ last seen 7/27/17 by Dr Sol Duarte; 1 year CPAP follow up - Dr Sol Duarte patient. [address] [id number] [address] [facility] [id number] Upcoming Health Maintenance Date Due ZOSTER VACCINE AGE 60> 5/16/2008 Pneumococcal 65+ High/Highest Risk (1 of 2 - PCV13) 7/16/2013 Influenza Age 5 to Adult 8/1/2017 HEMOGLOBIN A1C Q6M 1/19/2018 MICROALBUMIN Q1 2/17/2018 LIPID PANEL Q1 2/17/2018 EYE EXAM RETINAL OR DILATED Q1 4/21/2018 FOOT EXAM Q1 7/25/2018 MEDICARE YEARLY EXAM 7/26/2018 GLAUCOMA SCREENING Q2Y 4/21/2019 COLONOSCOPY 7/7/2025 DTaP/Tdap/Td series (2 - Td) 7/25/2027 Allergies as of 12/21/2017  Review Complete On: 12/21/2017 By: Sherren Fischer, MD  
  
 Severity Noted Reaction Type Reactions Erythromycin  05/12/2014    Nausea and Vomiting Current Immunizations  Never Reviewed No immunizations on file. Not reviewed this visit You Were Diagnosed With   
  
 Codes Comments Type 2 diabetes mellitus with hyperglycemia, without long-term current use of insulin (HCC)    -  Primary ICD-10-CM: E11.65 ICD-9-CM: 250.00, 790.29 Essential hypertension     ICD-10-CM: I10 
ICD-9-CM: 401.9 Vitals BP Pulse Temp Resp Height(growth percentile) Weight(growth percentile) (!) 167/92 (BP 1 Location: Right arm, BP Patient Position: Sitting) 82 98.2 °F (36.8 °C) (Oral) 16 5' 10\" (1.778 m) 211 lb 14.4 oz (96.1 kg) SpO2 BMI Smoking Status 93% 30.4 kg/m2 Never Smoker BMI and BSA Data Body Mass Index Body Surface Area  
 30.4 kg/m 2 2.18 m 2 Preferred Pharmacy Pharmacy Name Phone [facility] [address] [address] [address] Your Updated Medication List  
  
   
This list is accurate as of: 12/21/17 12:07 PM.  Always use your most recent med list.  
  
  
  
  
 losartan 25 mg tablet Commonly known as:  COZAAR Take 1 Tab by mouth daily. We Performed the Following AMB POC GLUCOSE, QUANTITATIVE, BLOOD [11705 CPT(R)] AMB POC HEMOGLOBIN A1C [80347 CPT(R)] Follow-up Instructions Return in about 6 months (around 6/21/2018). Introducing hospitals & [facility] SERVICES! Dear Lukas Blount: 
Thank you for requesting a [employer] account. Our records indicate that you already have an active [employer] account. You can access your account anytime at [email or web address] "[email or web address]/[email or web address] Did you know that you can access your hospital and ER discharge instructions at any time in [employer]? You can also review all of your test results from your hospital stay or ER visit. Additional Information If you have questions, please visit the Frequently Asked Questions section of the [employer] website at [email or web address] "[email or web address] Remember, [employer] is NOT to be used for urgent needs. For medical emergencies, dial 911. Now available from your iPhone and Android! Please provide this summary of care documentation to your next provider. Your primary care clinician is listed as LINA MCKEON. If you have any questions after today's visit, please call [phone].

## 2017-12-21 NOTE — TELEPHONE ENCOUNTER
----- Message from Malaika Blakely Dr sent at 12/21/2017  9:07 AM EST -----  Regarding: Dr. Rambo Cannon / Refill  Contact: [phone]  Pt needs refill on Losartan Potassium 25 mg. He is not really sure about the name but its the only medication he takes. Please send to [address] in [place].

## 2017-12-21 NOTE — PROGRESS NOTES
[facility]               Melvin Parra MD        [address] 78 [phone] Fax [phone]               Patient Information  Date:12/23/2017  Name : Keshav Deutsch 69 y.o.     YOB: 1950             Chief Complaint   Patient presents with    Diabetes       History of Present Illness: Keshav Deutsch is a 69 y.o. male here for fu of  Type 2 Diabetes Mellitus. Type 2 Diabetes was diagnosed several years ago  . He was seeing an endocrinologist, Dr. Denzel George in [place]. He was on Metformin before, wanted to control it with diet. He has self-stopped Metformin. Did not take the blood pressure medication today, under a lot of stress and not sleeping well due to some personal issues   no chest pain or shortness of breath, severe headaches. Wt Readings from Last 3 Encounters:   12/21/17 211 lb 14.4 oz (96.1 kg)   07/27/17 202 lb (91.6 kg)   07/25/17 201 lb (91.2 kg)       BP Readings from Last 3 Encounters:   12/21/17 (!) 167/92   07/27/17 127/71   07/25/17 138/84           Past Medical History:   Diagnosis Date    Diabetes ([facility]     Type 2, diet controlled    Headache     Hypertension     Sleep apnea     cpap     Current Outpatient Prescriptions   Medication Sig    losartan (COZAAR) 50 mg tablet Take 1 Tab by mouth daily. Indications: hypertension     No current facility-administered medications for this visit. Allergies   Allergen Reactions    Erythromycin Nausea and Vomiting         Review of Systems:  -   - Gastrointestinal: no dysphagia ,  abdominal pain  - Musculoskeletal: no joint pains no  weakness  - Integumentary: no rashes  - Neurological: no numbness, tingling, no  headaches  - Psychiatric: no depression no  anxiety  - Endocrine: no heat or cold intolerance    Physical Examination:   Blood pressure (!) 167/92, pulse 82, temperature 98.2 °F (36.8 °C), temperature source Oral, resp.  rate 16, height 5' 10\" (1.778 m), weight 211 lb 14.4 oz (96.1 kg), SpO2 93 %. Estimated body mass index is 30.4 kg/(m^2) as calculated from the following:    Height as of this encounter: 5' 10\" (1.778 m). -   Weight as of this encounter: 211 lb 14.4 oz (96.1 kg). - General: pleasant, no distress, good eye contact  - HEENT: no pallor, no periorbital edema, EOMI  - Neck: supple, no thyromegaly, no nodules  - Cardiovascular: regular, normal rate, normal S1 and S2,   - Respiratory: clear to auscultation bilaterally  - Gastrointestinal: soft, nontender, nondistended,   - Neurological: alert and oriented  - Psychiatric: normal mood and affect  - Skin: color, texture, turgor normal.       Data Reviewed:     [] Glucose records reviewed. [] See glucose records for details (to be scanned). [] A1C  [] Reviewed labs      nl 8/16    Assessment/Plan:     1. Type 2 diabetes mellitus with hyperglycemia, without long-term current use of insulin ([facility]    2. Essential hypertension        1. Type 2 Diabetes Mellitus   Lab Results   Component Value Date/Time    Hemoglobin A1c 7.2 02/17/2017 03:42 PM    Hemoglobin A1c (POC) 6.3 12/21/2017 11:20 AM    Hemoglobin A1c, External 9.2 01/19/2016     Diet controlled   Refused Metformin XR    2. HTN : Losartan , increase the dose. BP is elevated - discussed about BP goals and low salt diet, was asked to check at local pharmacy and report. 3 Hernia s/p repair , he used to think abdominal pain was due to Metformin and stopped it , I think his pain was due to hernia     4. Obesity:Body mass index is 30.4 kg/(m^2). Discussed about the importance of exercise and carbohydrate portion control. Discussed the need for primary care physician. There are no Patient Instructions on file for this visit. Follow-up Disposition:  Return in about 3 months (around 3/21/2018). Thank you for allowing me to participate in the care of this patient.     Erlin Molina MD      Patient verbalized understanding

## 2018-05-30 ENCOUNTER — OFFICE VISIT (OUTPATIENT)
Dept: FAMILY MEDICINE CLINIC | Age: 68
End: 2018-05-30

## 2018-05-30 VITALS
RESPIRATION RATE: 18 BRPM | WEIGHT: 213.8 LBS | TEMPERATURE: 98 F | HEART RATE: 84 BPM | DIASTOLIC BLOOD PRESSURE: 81 MMHG | OXYGEN SATURATION: 95 % | BODY MASS INDEX: 30.61 KG/M2 | SYSTOLIC BLOOD PRESSURE: 153 MMHG | HEIGHT: 70 IN

## 2018-05-30 DIAGNOSIS — R21 RASH: Primary | ICD-10-CM

## 2018-05-30 DIAGNOSIS — I10 ESSENTIAL HYPERTENSION: ICD-10-CM

## 2018-05-30 RX ORDER — PREDNISONE 10 MG/1
TABLET ORAL
Qty: 1 PACKAGE | Refills: 0 | Status: SHIPPED | OUTPATIENT
Start: 2018-05-30 | End: 2018-05-30 | Stop reason: SDUPTHER

## 2018-05-30 RX ORDER — PREDNISONE 10 MG/1
TABLET ORAL
Qty: 1 PACKAGE | Refills: 0 | Status: SHIPPED | OUTPATIENT
Start: 2018-05-30 | End: 2019-05-30

## 2018-05-30 NOTE — PROGRESS NOTES
Chief Complaint   Patient presents with    Rash     itchy in the am    Skin Problem     arms,legs     1. Have you been to the ER, urgent care clinic since your last visit? Hospitalized since your last visit? No    2. Have you seen or consulted any other health care providers outside of the [address] [place] [address] since your last visit? Include any pap smears or colon screening. Yes Where: [address] [place] [address] Rd        Rash going on for 2 months      Chief Complaint   Patient presents with    Rash     itchy in the am    Skin Problem     arms,legs     He is a 69 y.o. male who presents for evalution. Reviewed PmHx, RxHx, FmHx, SocHx, AllgHx and updated and dated in the chart. Patient Active Problem List    Diagnosis    Advance care planning     dwp lw      Acute renal failure ([facility]    Type 2 diabetes mellitus with hyperglycemia, without long-term current use of insulin ([facility]    Essential hypertension       Review of Systems - negative except as listed above in the HPI    Objective:     Vitals:    05/30/18 1607   BP: 153/81   Pulse: 84   Resp: 18   Temp: 98 °F (36.7 °C)   TempSrc: Oral   SpO2: 95%   Weight: 213 lb 12.8 oz (97 kg)   Height: 5' 10\" (1.778 m)     Physical Examination: General appearance - alert, well appearing, and in no distress  Skin - diffuse patchy rash all over body    Assessment/ Plan:   Diagnoses and all orders for this visit:    1. Rash  -     predniSONE (STERAPRED DS) 10 mg dose pack; 12 day DS taper pack as directed  -refer to derm    2. Essential hypertension  -sl inc  -seeing endo     Follow-up Disposition:  Return if symptoms worsen or fail to improve. I have discussed the diagnosis with the patient and the intended plan as seen in the above orders. The patient understands and agrees with the plan. The patient has received an after-visit summary and questions were answered concerning future plans.      Medication Side Effects and Warnings were discussed with patient  Patient Labs were reviewed and or requested:  Patient Past Records were reviewed and or requested    Claudia Cuadra M.D. There are no Patient Instructions on file for this visit.

## 2018-06-01 ENCOUNTER — HOSPITAL ENCOUNTER (OUTPATIENT)
Dept: LAB | Age: 68
Discharge: HOME | End: 2018-06-01
Payer: MEDICARE

## 2018-06-01 ENCOUNTER — OFFICE VISIT (OUTPATIENT)
Dept: ENDOCRINOLOGY | Age: 68
End: 2018-06-01

## 2018-06-01 VITALS
HEART RATE: 64 BPM | HEIGHT: 70 IN | OXYGEN SATURATION: 95 % | BODY MASS INDEX: 30.32 KG/M2 | SYSTOLIC BLOOD PRESSURE: 171 MMHG | WEIGHT: 211.8 LBS | DIASTOLIC BLOOD PRESSURE: 96 MMHG | TEMPERATURE: 97.2 F | RESPIRATION RATE: 14 BRPM

## 2018-06-01 DIAGNOSIS — E11.65 TYPE 2 DIABETES MELLITUS WITH HYPERGLYCEMIA, WITHOUT LONG-TERM CURRENT USE OF INSULIN (HCC): Primary | ICD-10-CM

## 2018-06-01 DIAGNOSIS — E11.65 TYPE 2 DIABETES MELLITUS WITH HYPERGLYCEMIA, WITHOUT LONG-TERM CURRENT USE OF INSULIN (HCC): ICD-10-CM

## 2018-06-01 DIAGNOSIS — I10 ESSENTIAL HYPERTENSION: ICD-10-CM

## 2018-06-01 LAB
GLUCOSE POC: 169 MG/DL
HBA1C MFR BLD HPLC: 6.7 %

## 2018-06-01 PROCEDURE — 80061 LIPID PANEL: CPT

## 2018-06-01 PROCEDURE — 80053 COMPREHEN METABOLIC PANEL: CPT

## 2018-06-01 PROCEDURE — 36415 COLL VENOUS BLD VENIPUNCTURE: CPT

## 2018-06-01 PROCEDURE — 82043 UR ALBUMIN QUANTITATIVE: CPT

## 2018-06-01 PROCEDURE — 83036 HEMOGLOBIN GLYCOSYLATED A1C: CPT

## 2018-06-01 NOTE — PROGRESS NOTES
Nirmala Knowles is a 69 y.o. male here for   Chief Complaint   Patient presents with    Diabetes       1. Have you been to the ER, urgent care clinic since your last visit? Hospitalized since your last visit? - [facility] for [facility] weeks     2. Have you seen or consulted any other health care providers outside of the [facility] since your last visit?   Include any pap smears or colon screening.- PCP Dr Roderick Peck

## 2018-06-01 NOTE — MR AVS SNAPSHOT
[address] [facility] [address] [facility] [address] 
[phone] Patient: Kurtis Gutierrez MRN:  [id number] Visit Information Date & Time Provider Department Dept. Phone Encounter #  
 6/1/2018 10:00 AM Belkys Fair MD [facility] Diabetes [facility] Endocrinology [phone] [id number] Follow-up Instructions Return in about 4 months (around 10/1/2018). Your Appointments 7/24/2018  9:20 AM  
Any with Jese Abbasi MD  
[facility] IMT ([address]) Appt Note: 1 year CPAP follow up_ last seen 7/27/17 by Dr Herbert Franklin; 1 year CPAP follow up - Dr Herbert Franklin patient. [address] [address] [address] 9[address] [facility] [address] Upcoming Health Maintenance Date Due ZOSTER VACCINE AGE 60> 5/16/2008 Pneumococcal 65+ High/Highest Risk (1 of 2 - PCV13) 7/16/2013 MICROALBUMIN Q1 2/17/2018 LIPID PANEL Q1 2/17/2018 EYE EXAM RETINAL OR DILATED Q1 4/21/2018 HEMOGLOBIN A1C Q6M 6/21/2018 FOOT EXAM Q1 7/25/2018 MEDICARE YEARLY EXAM 7/26/2018 Influenza Age 5 to Adult 8/1/2018 GLAUCOMA SCREENING Q2Y 4/21/2019 COLONOSCOPY 7/7/2025 DTaP/Tdap/Td series (2 - Td) 7/25/2027 Allergies as of 6/1/2018  Review Complete On: 6/1/2018 By: Belkys Fair MD  
  
 Severity Noted Reaction Type Reactions Erythromycin  05/12/2014    Nausea and Vomiting Current Immunizations  Never Reviewed No immunizations on file. Not reviewed this visit You Were Diagnosed With   
  
 Codes Comments Type 2 diabetes mellitus with hyperglycemia, without long-term current use of insulin (HCC)    -  Primary ICD-10-CM: E11.65 ICD-9-CM: 250.00, 790.29 Vitals BP Pulse Temp Resp Height(growth percentile) Weight(growth percentile)  (!) 171/96 (BP 1 Location: Right arm, BP Patient Position: Sitting) 64 97.2 °F (36.2 °C) (Oral) 14 5' 10\" (1.778 m) 211 lb 12.8 oz (96.1 kg) SpO2 BMI Smoking Status 95% 30.39 kg/m2 Never Smoker Vitals History BMI and BSA Data Body Mass Index Body Surface Area  
 30.39 kg/m 2 2.18 m 2 Preferred Pharmacy Pharmacy Name Phone [address] Shahnaz Montalvo [address] Your Updated Medication List  
  
   
This list is accurate as of 6/1/18 11:06 AM.  Always use your most recent med list.  
  
  
  
  
 losartan 50 mg tablet Commonly known as:  COZAAR Take 1 Tab by mouth daily. Indications: hypertension  
  
 predniSONE 10 mg dose pack Commonly known as:  STERAPRED DS  
12 day DS taper pack as directed We Performed the Following AMB POC GLUCOSE BLOOD, BY GLUCOSE MONITORING DEVICE [69141 CPT(R)] AMB POC HEMOGLOBIN A1C [81490 CPT(R)] Follow-up Instructions Return in about 4 months (around 10/1/2018). Introducing [facility] & HEALTH SERVICES! Dear Isaiah Sylvester: 
Thank you for requesting a RockBee account. Our records indicate that you already have an active RockBee account. You can access your account anytime at [email or web address] Did you know that you can access your hospital and ER discharge instructions at any time in RockBee? You can also review all of your test results from your hospital stay or ER visit. Additional Information If you have questions, please visit the Frequently Asked Questions section of the RockBee website at [email or web address] Remember, RockBee is NOT to be used for urgent needs. For medical emergencies, dial 911. Now available from your iPhone and Android! Please provide this summary of care documentation to your next provider. Your primary care clinician is listed as Phys Other. If you have any questions after today's visit, please call [phone].

## 2018-06-01 NOTE — PROGRESS NOTES
[facility]               Phyllis Hill MD        [address] [address] KE:[phone] Fax [phone]               Patient Information  Date:6/1/2018  Name : Mary Ulloa 69 y.o.     YOB: 1950             Chief Complaint   Patient presents with    Diabetes       History of Present Illness: Mary Ulloa is a 69 y.o. male here for fu of  Type 2 Diabetes Mellitus. Type 2 Diabetes was diagnosed several years ago  . He was seeing an endocrinologist, Dr. Jake Ray in [place]. He was on Metformin before, wanted to control it with diet. He has self-stopped Metformin. no chest pain or shortness of breath, severe headaches. Wt Readings from Last 3 Encounters:   06/01/18 211 lb 12.8 oz (96.1 kg)   05/30/18 213 lb 12.8 oz (97 kg)   12/21/17 211 lb 14.4 oz (96.1 kg)       BP Readings from Last 3 Encounters:   06/01/18 (!) 171/96   05/30/18 153/81   12/21/17 (!) 167/92           Past Medical History:   Diagnosis Date    Diabetes ([facility]     Type 2, diet controlled    Headache     Hypertension     Sleep apnea     cpap     Current Outpatient Prescriptions   Medication Sig    predniSONE (STERAPRED DS) 10 mg dose pack 12 day DS taper pack as directed    losartan (COZAAR) 50 mg tablet Take 1 Tab by mouth daily. Indications: hypertension (Patient taking differently: Take 50 mg by mouth daily. Takes 1/2 tab  Indications: hypertension)     No current facility-administered medications for this visit.       Allergies   Allergen Reactions    Erythromycin Nausea and Vomiting         Review of Systems:  -   - Gastrointestinal: no dysphagia ,  abdominal pain  - Musculoskeletal: no joint pains no  weakness  - Integumentary: no rashes  - Neurological: no numbness, tingling, no  headaches  - Psychiatric: no depression no  anxiety  - Endocrine: no heat or cold intolerance    Physical Examination:   Blood pressure (!) 171/96, pulse 64, temperature 97.2 °F (36.2 °C), temperature source Oral, resp. rate 14, height 5' 10\" (1.778 m), weight 211 lb 12.8 oz (96.1 kg), SpO2 95 %. Estimated body mass index is 30.39 kg/(m^2) as calculated from the following:    Height as of this encounter: 5' 10\" (1.778 m). -   Weight as of this encounter: 211 lb 12.8 oz (96.1 kg). - General: pleasant, no distress, good eye contact  - HEENT: no pallor, no periorbital edema, EOMI  - Neck: supple, no thyromegaly, no nodules  - Cardiovascular: regular, normal rate, normal S1 and S2,   - Respiratory: clear to auscultation bilaterally  - Gastrointestinal: soft, nontender, nondistended,   - Neurological: alert and oriented  - Psychiatric: normal mood and affect  - Skin: color, texture, turgor normal.       Diabetic foot exam: June 2080    Left Foot:   Visual Exam: callus    Pulse DP: 2+ (normal)   Filament test: Decreased sensation    Vibratory sensation: ND      Right Foot:   Visual Exam: callous - bilateral    Pulse DP: 2+ (normal)   Filament test: Decreased sensation    Vibratory sensation: ND           Data Reviewed:     [] Glucose records reviewed. [] See glucose records for details (to be scanned). [] A1C  [] Reviewed labs     [facility] 8/16    Assessment/Plan:     1. Type 2 diabetes mellitus with hyperglycemia, without long-term current use of insulin (HCC)        1. Type 2 Diabetes Mellitus   Lab Results   Component Value Date/Time    Hemoglobin A1c 7.2 (H) 02/17/2017 03:42 PM    Hemoglobin A1c (POC) 6.7 06/01/2018 10:26 AM    Hemoglobin A1c, External 9.2 01/19/2016     Wants to control it with diet  Refused Metformin XR    2. HTN : Blood pressure is elevated  Compliance with the medications discussed, low-salt diet  Increase it to 50 mg losartan          4. Obesity:Body mass index is 30.39 kg/(m^2). Discussed about the importance of exercise and carbohydrate portion control. JOVANY     There are no Patient Instructions on file for this visit.   Follow-up Disposition: Not on File    Thank you for allowing me to participate in the care of this patient.     Aguilar Montenegro MD      Patient verbalized understanding

## 2018-06-04 LAB
ALBUMIN SERPL-MCNC: 4.5 G/DL (ref 3.6–4.8)
ALBUMIN/CREAT UR: 41.9 MG/G CREAT (ref 0–30)
ALBUMIN/GLOB SERPL: 1.6 {RATIO} (ref 1.2–2.2)
ALP SERPL-CCNC: 72 IU/L (ref 39–117)
ALT SERPL-CCNC: 21 IU/L (ref 0–44)
AST SERPL-CCNC: 18 IU/L (ref 0–40)
BILIRUB SERPL-MCNC: 0.6 MG/DL (ref 0–1.2)
BUN SERPL-MCNC: 25 MG/DL (ref 8–27)
BUN/CREAT SERPL: 25 (ref 10–24)
CALCIUM SERPL-MCNC: 9.4 MG/DL (ref 8.6–10.2)
CHLORIDE SERPL-SCNC: 103 MMOL/L (ref 96–106)
CHOLEST SERPL-MCNC: 136 MG/DL (ref 100–199)
CO2 SERPL-SCNC: 21 MMOL/L (ref 18–29)
CREAT SERPL-MCNC: 1 MG/DL (ref 0.76–1.27)
CREAT UR-MCNC: 162.9 MG/DL
EST. AVERAGE GLUCOSE BLD GHB EST-MCNC: 160 MG/DL
GFR SERPLBLD CREATININE-BSD FMLA CKD-EPI: 76 ML/MIN/1.73
GFR SERPLBLD CREATININE-BSD FMLA CKD-EPI: 88 ML/MIN/1.73
GLOBULIN SER CALC-MCNC: 2.8 G/DL (ref 1.5–4.5)
GLUCOSE SERPL-MCNC: 149 MG/DL (ref 65–99)
HBA1C MFR BLD: 7.2 % (ref 4.8–5.6)
HDLC SERPL-MCNC: 37 MG/DL
INTERPRETATION, 910389: NORMAL
LDLC SERPL CALC-MCNC: 84 MG/DL (ref 0–99)
Lab: NORMAL
MICROALBUMIN UR-MCNC: 68.3 UG/ML
POTASSIUM SERPL-SCNC: 4.5 MMOL/L (ref 3.5–5.2)
PROT SERPL-MCNC: 7.3 G/DL (ref 6–8.5)
SODIUM SERPL-SCNC: 140 MMOL/L (ref 134–144)
TRIGL SERPL-MCNC: 74 MG/DL (ref 0–149)
VLDLC SERPL CALC-MCNC: 15 MG/DL (ref 5–40)

## 2018-07-27 ENCOUNTER — DOCUMENTATION ONLY (OUTPATIENT)
Dept: SLEEP MEDICINE | Age: 68
End: 2018-07-27

## 2018-07-27 ENCOUNTER — OFFICE VISIT (OUTPATIENT)
Dept: SLEEP MEDICINE | Age: 68
End: 2018-07-27

## 2018-07-27 VITALS
WEIGHT: 236 LBS | OXYGEN SATURATION: 96 % | DIASTOLIC BLOOD PRESSURE: 87 MMHG | BODY MASS INDEX: 33.79 KG/M2 | SYSTOLIC BLOOD PRESSURE: 177 MMHG | HEIGHT: 70 IN | HEART RATE: 62 BPM

## 2018-07-27 DIAGNOSIS — I10 ESSENTIAL HYPERTENSION: ICD-10-CM

## 2018-07-27 DIAGNOSIS — G47.33 OSA (OBSTRUCTIVE SLEEP APNEA): Primary | ICD-10-CM

## 2018-07-27 NOTE — PROGRESS NOTES
This note will not be viewable in [address] [facility] [facility] [address] [place]. [address] [facility], [address] Tel.  [phone] Fax. 100 [facility] [address] [place], [address] Tel.  [phone] Fax. [phone] [address] [facility] Tel.  [phone] Fax. [phone] S>Bryan Duarte is a 77 y.o. male seen for a positive airway pressure follow-up. He reports no problems using the device. The following problems are identified: 
 
Drowsiness no Problems exhaling no Snoring no Forget to put on no Mask Comfortable yes Can't fall asleep no  
Dry Mouth no Mask falls off no Air Leaking no Frequent awakenings no Download reviewed. He has been under a lot of stress. He was admitted to [facility] he says as a TDO admission. He has been working through legal troubles regarding past tax returns. He says that's why his blood pressure is up. He has been taking his medication He admits that his sleep has improved. Therapy Apnea Index averaged over PAP use: 2 /hr which reflects improved sleep breathing condition. Allergies Allergen Reactions  Erythromycin Nausea and Vomiting He has a current medication list which includes the following prescription(s): glucose blood vi test strips, losartan, and prednisone. [employer] [place] He  has a past medical history of Diabetes ([address] Headache; Hypertension; and Sleep apnea. He also has no past medical history of Anemia; Arrhythmia; Arthritis; Asthma; Autoimmune disease ([address] CAD (coronary artery disease); Calculus of kidney; Cancer ([address] Chronic kidney disease; Chronic obstructive pulmonary disease ([address] Chronic pain; Congestive heart failure ([address] Contact dermatitis and other eczema, due to unspecified cause; Depression; GERD (gastroesophageal reflux disease); Hypercholesterolemia; Liver disease; Malignant hyperthermia due to anesthesia; Psychotic disorder; PUD (peptic ulcer disease);  Seizures ([facility] Stroke [facility]); Thromboembolus ([facility] Thyroid disease; or Trauma. [place] Sleepiness Score: 11 
 and Modified F.O.S.Q. Score Total / 2: 11 
 which reflect improved sleep quality over therapy time. O>   
Visit Vitals  /87 Comment: 173 87  
 Pulse 62  Ht 5' 10\" (1.778 m)  Wt 236 lb (107 kg)  SpO2 96%  BMI 33.86 kg/m2 General:   Alert, oriented, not in distress Neck:   No JVD Chest/Lungs:  symetrical lung expansion , no accessory muscle use Extremities:  no obvious rashes , negative edema Neuro:  No focal deficits ; No obvious tremor Psych:  Normal affect ,  Normal countenance ;    
 
 
A> 
  ICD-10-CM ICD-9-CM 1. JOVANY (obstructive sleep apnea) G47.33 327.23   
2. Essential hypertension I10 401.9 AHI = 6(2017). On CPAP :  5-15 cmH2O. Compliant:      yes Therapeutic Response:  Positive P> 
 
 
* Follow-up Disposition: 
Return in about 1 year (around 7/27/2019). he will continue on his current pressure settings. I have ordered replacement supplies * He was asked to contact our office for any problems regarding PAP therapy. * Counseling was provided regarding the importance of regular PAP use and on proper sleep hygiene and safe driving. * Re-enforced proper and regular cleaning for the device. I have counseled the patient regarding the benefits of weight loss. 2. Hypertension - he continues on his current regimen. He should monitor the blood pressure and see his PMD for medication adjustment. I have reviewed the relationship between hypertension as it relates to sleep-disordered breathing. Electronically signed by 
 
Dick Alaniz MD 
Diplomate in Sleep Medicine [facility]

## 2018-07-27 NOTE — PATIENT INSTRUCTIONS
[address] [place]. [address] [facility], [address] Tel.  [phone] Fax. 100 [facility] [address] [place], [address] [facility] Tel.  [phone] Fax. [phone] [address]  Tel.  [phone] Fax. [phone] PROPER SLEEP HYGIENE What to avoid · Do not have drinks with caffeine, such as coffee or black tea, for 8 hours before bed. · Do not smoke or use other types of tobacco near bedtime. Nicotine is a stimulant and can keep you awake. · Avoid drinking alcohol late in the evening, because it can cause you to wake in the middle of the night. · Do not eat a big meal close to bedtime. If you are hungry, eat a light snack. · Do not drink a lot of water close to bedtime, because the need to urinate may wake you up during the night. · Do not read or watch TV in bed. Use the bed only for sleeping and sexual activity. What to try · Go to bed at the same time every night, and wake up at the same time every morning. Do not take naps during the day. · Keep your bedroom quiet, dark, and cool. · Get regular exercise, but not within 3 to 4 hours of your bedtime. [place] · Sleep on a comfortable pillow and mattress. · If watching the clock makes you anxious, turn it facing away from you so you cannot see the time. · If you worry when you lie down, start a worry book. Well before bedtime, write down your worries, and then set the book and your concerns aside. · Try meditation or other relaxation techniques before you go to bed. · If you cannot fall asleep, get up and go to another room until you feel sleepy. Do something relaxing. Repeat your bedtime routine before you go to bed again. · Make your house quiet and calm about an hour before bedtime. Turn down the lights, turn off the TV, log off the computer, and turn down the volume on music. This can help you relax after a busy day. Drowsy Driving The [employer] Administration cites drowsiness as a causing factor in more than 444,944 police reported crashes annually, resulting in 76,000 injuries and 1,500 deaths. Other surveys suggest 55% of people polled have driven while drowsy in the past year, 23% had fallen asleep but not crashed, 3% crashed, and 2% had and accident due to drowsy driving. Who is at risk? Young Drivers: One study of drowsy driving accidents states that 55% of the drivers were under 25 years. Of those, 75% were male. Shift Workers and Travelers: People who work overnight or travel across time zones frequently are at higher risk of experiencing Circadian Rhythm Disorders. They are trying to work and function when their body is programed to sleep. Sleep Deprived: Lack of sleep has a serious impact on your ability to pay attention or focus on a task. Consistently getting less than the average of 8 hours your body needs creates partial or cumulative sleep deprivation. Untreated Sleep Disorders: Sleep Apnea, Narcolepsy, R.L.S., and other sleep disorders (untreated) prevent a person from getting enough restful sleep. This leads to excessive daytime sleepiness and increases the risk for drowsy driving accidents by up to 7 times. Medications / Alcohol: Even over the counter medications can cause drowsiness. Medications that impair a drivers attention should have a warning label. Alcohol naturally makes you sleepy and on its own can cause accidents. Combined with excessive drowsiness its effects are amplified. Signs of Drowsy Driving: * You don't remember driving the last few miles * You may drift out of your mary * You are unable to focus and your thoughts wander * You may yawn more often than normal 
 * You have difficulty keeping your eyes open / nodding off * Missing traffic signs, speeding, or tailgating Prevention-  
Good sleep hygiene, lifestyle and behavioral choices have the most impact on drowsy driving.  There is no substitute for sleep and the average person requires 8 hours nightly. If you find yourself driving drowsy, stop and sleep. Consider the sleep hygiene tips provided during your visit as well. Medication Refill Policy: Refills for all medications require 1 week advance notice. Please have your pharmacy fax a refill request. We are unable to fax, or call in \"controled substance\" medications and you will need to pick these prescriptions up from our office. Diasporahar[employer]Mark Activation Thank you for requesting access to APProtect. Please follow the instructions below to securely access and download your online medical record. APProtect allows you to send messages to your doctor, view your test results, renew your prescriptions, schedule appointments, and more. How Do I Sign Up? 1. In your internet browser, go to [email or web address]. [email or web address]. 2. Click on the First Time User? Click Here link in the Sign In box. You will see the New Member Sign Up page. 3. Enter your APProtect Access Code exactly as it appears below. You will not need to use this code after youve completed the sign-up process. If you do not sign up before the expiration date, you must request a new code. APProtect Access Code: Activation code not generated Current APProtect Status: Active (This is the date your APProtect access code will ) 4. Enter the last four digits of your Social Security Number (xxxx) and Date of Birth (mm/dd/yyyy) as indicated and click Submit. You will be taken to the next sign-up page. 5. Create a APProtect ID. This will be your APProtect login ID and cannot be changed, so think of one that is secure and easy to remember. 6. Create a APProtect password. You can change your password at any time. 7. Enter your Password Reset Question and Answer. This can be used at a later time if you forget your password. 8. Enter your e-mail address. You will receive e-mail notification when new information is available in [address]  
9. Click Sign Up. You can now view and download portions of your medical record. 10. Click the Download Summary menu link to download a portable copy of your medical information. Additional Information If you have questions, please call [phone]. Remember, Funtactix is NOT to be used for urgent needs. For medical emergencies, dial 911.

## 2018-09-26 ENCOUNTER — DOCUMENTATION ONLY (OUTPATIENT)
Dept: SLEEP MEDICINE | Age: 68
End: 2018-09-26

## 2018-09-26 NOTE — PROGRESS NOTES
Per Kellee Herrmann office request/ patient's signed request, records faxed to them (fax no [phone]).

## 2018-10-04 ENCOUNTER — OFFICE VISIT (OUTPATIENT)
Dept: ENDOCRINOLOGY | Age: 68
End: 2018-10-04

## 2018-10-04 VITALS
HEART RATE: 69 BPM | WEIGHT: 229.7 LBS | OXYGEN SATURATION: 95 % | DIASTOLIC BLOOD PRESSURE: 95 MMHG | HEIGHT: 70 IN | TEMPERATURE: 97.2 F | SYSTOLIC BLOOD PRESSURE: 171 MMHG | BODY MASS INDEX: 32.88 KG/M2 | RESPIRATION RATE: 18 BRPM

## 2018-10-04 DIAGNOSIS — E11.65 TYPE 2 DIABETES MELLITUS WITH HYPERGLYCEMIA, WITHOUT LONG-TERM CURRENT USE OF INSULIN (HCC): Primary | ICD-10-CM

## 2018-10-04 DIAGNOSIS — I10 ESSENTIAL HYPERTENSION: ICD-10-CM

## 2018-10-04 LAB — HBA1C MFR BLD HPLC: 7.4 %

## 2018-10-04 NOTE — PROGRESS NOTES
[facility] DIABETES AND ENDOCRINOLOGY Shekhar Lindsay MD 
 
    [address] [phone] Fax [phone] Patient Information Date:10/4/2018 Name : Camille Morgan 77 y.o.    
YOB: 1950 Chief Complaint Patient presents with  Follow-up DMII History of Present Illness: Camille Morgan is a 77 y.o. male here for fu of  Type 2 Diabetes Mellitus. Type 2 Diabetes was diagnosed several years ago  . He was seeing an endocrinologist, Dr. Saroj Chopra in [place]. He was on Metformin before, wanted to control it with diet. He has self-stopped Metformin. Has not been checking the blood glucose is Did not take the blood pressure medication today and hence the blood pressure is elevated No chest pain, shortness of breath Has lost some weight 
 
 no chest pain or shortness of breath, severe headaches. Wt Readings from Last 3 Encounters:  
10/04/18 229 lb 11.2 oz (104.2 kg) 07/27/18 236 lb (107 kg) 06/01/18 211 lb 12.8 oz (96.1 kg) BP Readings from Last 3 Encounters:  
10/04/18 (!) 171/95  
07/27/18 177/87  
06/01/18 (!) 171/96 Past Medical History:  
Diagnosis Date  Diabetes ([address] Type 2, diet controlled  Headache  Hypertension  Sleep apnea   
 cpap Current Outpatient Prescriptions Medication Sig  
 glucose blood VI test strips (ONETOUCH ULTRA TEST) strip Use as directed to check blood sugars daily. DX Code: E11.65  
 losartan (COZAAR) 50 mg tablet Take 1 Tab by mouth daily. Indications: hypertension (Patient taking differently: Take 50 mg by mouth daily. Takes 1/2 tab  Indications: hypertension)  predniSONE (STERAPRED DS) 10 mg dose pack 12 day DS taper pack as directed No current facility-administered medications for this visit. Allergies Allergen Reactions  Erythromycin Nausea and Vomiting Review of Systems: - - Gastrointestinal: no dysphagia ,  abdominal pain - Musculoskeletal: no joint pains no  weakness - Integumentary: no rashes - Neurological: no numbness, tingling, no  headaches - Psychiatric: no depression no  anxiety - Endocrine: no heat or cold intolerance Physical Examination: 
 Blood pressure (!) 171/95, pulse 69, temperature 97.2 °F (36.2 °C), temperature source Oral, resp. rate 18, height 5' 10\" (1.778 m), weight 229 lb 11.2 oz (104.2 kg), SpO2 95 %. Estimated body mass index is 32.96 kg/(m^2) as calculated from the following: 
  Height as of this encounter: 5' 10\" (1.778 m). -   Weight as of this encounter: 229 lb 11.2 oz (104.2 kg). - General: pleasant, no distress, good eye contact 
- HEENT: no pallor, no periorbital edema, EOMI 
- Neck: supple, no thyromegaly, no nodules - Cardiovascular: regular, normal rate, normal S1 and S2,  
- Respiratory: clear to auscultation bilaterally - Gastrointestinal: soft, nontender, nondistended,  
- Neurological: alert and oriented - Psychiatric: normal mood and affect 
- Skin: color, texture, turgor normal.  
 
 
Diabetic foot exam: June 2018 Left Foot: 
 Visual Exam: callus Pulse DP: 2+ (normal) Filament test: Decreased sensation Vibratory sensation: ND Right Foot: 
 Visual Exam: callous - bilateral  
 Pulse DP: 2+ (normal) Filament test: Decreased sensation Vibratory sensation: ND  
 
 
 
 
Data Reviewed:  
 
[] Glucose records reviewed. [] See glucose records for details (to be scanned). [] A1C [] Reviewed labs [facility] 8/16 Assessment/Plan: 1. Type 2 diabetes mellitus with hyperglycemia, without long-term current use of insulin (HCC) 1. Type 2 Diabetes Mellitus Lab Results Component Value Date/Time Hemoglobin A1c 7.2 (H) 06/01/2018 11:30 AM  
 Hemoglobin A1c (POC) 7.4 10/04/2018 11:01 AM  
 Hemoglobin A1c, External 9.2 01/19/2016 Wants to control it with diet Refused Metformin XR 
 
 2.  HTN : Blood pressure is elevated Compliance with the medications discussed, low-salt diet Did not take the medication today Goals of the blood pressure discussed Establish care with PCP 4. Obesity:Body mass index is 32.96 kg/(m^2). Discussed about the importance of exercise and carbohydrate portion control. JOVANY There are no Patient Instructions on file for this visit. Follow-up Disposition: Not on File Thank you for allowing me to participate in the care of this patient. Yoshi Michaud MD 
 
 
Patient verbalized understanding

## 2018-10-04 NOTE — PROGRESS NOTES
All health maintenance and other pertinent information has been reviewed in preparation for today's office visit. Patient presents in the office today for: Chief Complaint Patient presents with  Follow-up DMII 1. Have you been to the ER, urgent care clinic since your last visit? Hospitalized since your last visit? No 
 
2. Have you seen or consulted any other health care providers outside of the [address] [place] [address] since your last visit? Include any pap smears or colon screening.  No

## 2018-10-04 NOTE — MR AVS SNAPSHOT
[address] [facility] [address] [facility] [address] 
[phone] Patient: Marty Mora MRN:  RA Visit Information Date & Time Provider Department Dept. Phone Encounter #  
 10/4/2018 10:45 AM Ez Foster MD [facility] [phone] [id number] Follow-up Instructions Return in about 4 months (around 2019). Your Appointments 2019 10:00 AM  
Any with Ryley Luz MD  
[address] ([facility]) Appt Note: yearly follow up  
 [address] [place] [id number] [address] [facility] [address] Upcoming Health Maintenance Date Due Shingrix Vaccine Age 50> (1 of 2) 1998 Pneumococcal 65+ High/Highest Risk (1 of 2 - PCV13) 2013 EYE EXAM RETINAL OR DILATED Q1 2018 MEDICARE YEARLY EXAM 2018 Influenza Age 5 to Adult 2018 HEMOGLOBIN A1C Q6M 2018 GLAUCOMA SCREENING Q2Y 2019 MICROALBUMIN Q1 2019 LIPID PANEL Q1 2019 FOOT EXAM Q1 6/3/2019 COLONOSCOPY 2025 DTaP/Tdap/Td series (2 - Td) 2027 Allergies as of 10/4/2018  Review Complete On: 10/4/2018 By: Kaleigh Mac LPN Severity Noted Reaction Type Reactions Erythromycin  2014    Nausea and Vomiting Current Immunizations  Never Reviewed No immunizations on file. Not reviewed this visit You Were Diagnosed With   
  
 Codes Comments Type 2 diabetes mellitus with hyperglycemia, without long-term current use of insulin (HCC)    -  Primary ICD-10-CM: E11.65 ICD-9-CM: 250.00, 790.29 Vitals BP Pulse Temp Resp Height(growth percentile) Weight(growth percentile) (!) 171/95 (BP 1 Location: Right arm, BP Patient Position: Sitting) 69 97.2 °F (36.2 °C) (Oral) 18 5' 10\" (1.778 m) 229 lb 11.2 oz (104.2 kg) SpO2 BMI Smoking Status 95% 32.96 kg/m2 Never Smoker Vitals History BMI and BSA Data Body Mass Index Body Surface Area  
 32.96 kg/m 2 2.27 m 2 Preferred Pharmacy Pharmacy Name Phone [facility] [address] Your Updated Medication List  
  
   
This list is accurate as of 10/4/18 11:30 AM.  Always use your most recent med list.  
  
  
  
  
 glucose blood VI test strips strip Commonly known as:  ONETOUCH ULTRA TEST Use as directed to check blood sugars daily. DX Code: E11.65  
  
 losartan 50 mg tablet Commonly known as:  COZAAR Take 1 Tab by mouth daily. Indications: hypertension  
  
 predniSONE 10 mg dose pack Commonly known as:  STERAPRED DS  
12 day DS taper pack as directed We Performed the Following AMB POC HEMOGLOBIN A1C [76992 CPT(R)] Follow-up Instructions Return in about 4 months (around 2/4/2019). Introducing [facility]! Dear Marcia Torrse: 
Thank you for requesting a Sutus account. Our records indicate that you already have an active Sutus account. You can access your account anytime at [email or web address] Did you know that you can access your hospital and ER discharge instructions at any time in Sutus? You can also review all of your test results from your hospital stay or ER visit. Additional Information If you have questions, please visit the Frequently Asked Questions section of the Sutus website at [email or web address] Remember, Sutus is NOT to be used for urgent needs. For medical emergencies, dial 911. Now available from your iPhone and Android! Please provide this summary of care documentation to your next provider. Your primary care clinician is listed as Phys Other. If you have any questions after today's visit, please call [phone].

## 2019-02-25 ENCOUNTER — OFFICE VISIT (OUTPATIENT)
Dept: ENDOCRINOLOGY | Age: 69
End: 2019-02-25

## 2019-02-25 VITALS
HEIGHT: 70 IN | RESPIRATION RATE: 16 BRPM | DIASTOLIC BLOOD PRESSURE: 92 MMHG | TEMPERATURE: 96.1 F | WEIGHT: 234 LBS | BODY MASS INDEX: 33.5 KG/M2 | OXYGEN SATURATION: 94 % | HEART RATE: 79 BPM | SYSTOLIC BLOOD PRESSURE: 178 MMHG

## 2019-02-25 DIAGNOSIS — E11.65 TYPE 2 DIABETES MELLITUS WITH HYPERGLYCEMIA, WITHOUT LONG-TERM CURRENT USE OF INSULIN (HCC): Primary | ICD-10-CM

## 2019-02-25 DIAGNOSIS — I10 ESSENTIAL HYPERTENSION: ICD-10-CM

## 2019-02-25 LAB
GLUCOSE POC: 285 MG/DL
HBA1C MFR BLD HPLC: 11.7 %

## 2019-02-25 NOTE — PROGRESS NOTES
Shivam Orr is a 77 y.o. male here for Chief Complaint Patient presents with  Diabetes Functional glucose monitor and record keeping system? - not checking Eye exam within last year? - on file Foot exam within last year? - on file 1. Have you been to the ER, urgent care clinic since your last visit? Hospitalized since your last visit? -no 
 
2. Have you seen or consulted any other health care providers outside of the [address] [place] [address] since your last visit?   Include any pap smears or colon screening.-no

## 2019-02-25 NOTE — PROGRESS NOTES
[facility] DIABETES AND ENDOCRINOLOGY Tammy Morris MD 
 
    [address] 78 [phone] Fax [phone] Patient Information Date:2/25/2019 Name : Manuel Mejias 77 y.o.    
YOB: 1950 Chief Complaint Patient presents with  Diabetes History of Present Illness: Manuel Mejias is a 77 y.o. male here for fu of  Type 2 Diabetes Mellitus. Type 2 Diabetes was diagnosed several years ago  . He was managed by endocrinologist, Dr. Harsha Palacios in [place] in the past 
He was on Metformin before, wanted to control it with diet. He has self-stopped Metformin. He has not been checking the blood glucose for the last 6 months, point of care A1c is more than 11, blood glucose 285 and he does not think that is accurate. When questioned and reports polyuria, polydipsia Again not compliant with the blood pressure medication. Does not really feel bad so he is thinking the blood test should be good. no chest pain or shortness of breath, severe headaches. Wt Readings from Last 3 Encounters:  
02/25/19 234 lb (106.1 kg) 10/04/18 229 lb 11.2 oz (104.2 kg) 07/27/18 236 lb (107 kg) BP Readings from Last 3 Encounters:  
02/25/19 (!) 170/94  
10/04/18 (!) 171/95  
07/27/18 177/87 Past Medical History:  
Diagnosis Date  Diabetes ([address] Type 2, diet controlled  Headache  Hypertension  Sleep apnea   
 cpap Current Outpatient Medications Medication Sig  
 glucose blood VI test strips (ONETOUCH ULTRA TEST) strip Use as directed to check blood sugars daily. DX Code: E11.65  
 losartan (COZAAR) 50 mg tablet Take 1 Tab by mouth daily. Indications: hypertension (Patient taking differently: Take 50 mg by mouth daily. Takes 1/2 tab  Indications: hypertension)  predniSONE (STERAPRED DS) 10 mg dose pack 12 day DS taper pack as directed No current facility-administered medications for this visit. Allergies Allergen Reactions  Erythromycin Nausea and Vomiting Review of Systems: 
-  
- Gastrointestinal: no dysphagia ,  abdominal pain - Musculoskeletal: no joint pains no  weakness - Integumentary: no rashes - Neurological: no numbness, tingling, no  headaches - Psychiatric: no depression no  anxiety - Endocrine: no heat or cold intolerance Physical Examination: 
 Blood pressure (!) 170/94, pulse 79, temperature 96.1 °F (35.6 °C), temperature source Oral, resp. rate 16, height 5' 10\" (1.778 m), weight 234 lb (106.1 kg), SpO2 94 %. Estimated body mass index is 33.58 kg/m² as calculated from the following: 
  Height as of this encounter: 5' 10\" (1.778 m). -   Weight as of this encounter: 234 lb (106.1 kg). - General: pleasant, no distress, good eye contact 
- HEENT: no pallor, no periorbital edema, EOMI 
- Neck: supple, no thyromegaly, no nodules - Cardiovascular: regular, normal rate, normal S1 and S2,  
- Respiratory: clear to auscultation bilaterally - Gastrointestinal: soft, nontender, nondistended,  
- Neurological: alert and oriented - Psychiatric: normal mood and affect 
- Skin: color, texture, turgor normal.  
 
 
Diabetic foot exam: June 2018 Left Foot: 
 Visual Exam: callus Pulse DP: 2+ (normal) Filament test: Decreased sensation Vibratory sensation: ND Right Foot: 
 Visual Exam: callous - bilateral  
 Pulse DP: 2+ (normal) Filament test: Decreased sensation Vibratory sensation: ND  
 
 
 
 
Data Reviewed:  
 
 
 
  nl 8/16 Assessment/Plan: 1. Type 2 diabetes mellitus with hyperglycemia, without long-term current use of insulin (HCC) 2. Essential hypertension 1. Type 2 Diabetes Mellitus Lab Results Component Value Date/Time Hemoglobin A1c 7.2 (H) 06/01/2018 11:30 AM  
 Hemoglobin A1c (POC) 11.7 02/25/2019 10:18 AM  
 Hemoglobin A1c, External 9.2 01/19/2016 Wants to control it with diet Refused any medications now Discussed the risks of untreated diabetes mellitus He agreed to check the blood glucose and if it is elevated will call us in a week, 
 
2. HTN : Blood pressure is elevated Compliance with the medications discussed, low-salt diet Risks of untreated hypertension discussed Follow-up with Dr. Tacho Donald 4. Obesity:Body mass index is 33.58 kg/m². Discussed about the importance of exercise and carbohydrate portion control. JOVANY There are no Patient Instructions on file for this visit. Follow-up Disposition: Not on File Thank you for allowing me to participate in the care of this patient. Srinivas Mills MD 
 
 
Patient verbalized understanding

## 2019-02-26 LAB
ALBUMIN SERPL-MCNC: 4.2 G/DL (ref 3.5–4.8)
ALBUMIN/CREAT UR: 23.8 MG/G CREAT (ref 0–30)
ALBUMIN/GLOB SERPL: 1.4 {RATIO} (ref 1.2–2.2)
ALP SERPL-CCNC: 64 IU/L (ref 39–117)
ALT SERPL-CCNC: 35 IU/L (ref 0–44)
AST SERPL-CCNC: 26 IU/L (ref 0–40)
BILIRUB SERPL-MCNC: 0.7 MG/DL (ref 0–1.2)
BUN SERPL-MCNC: 21 MG/DL (ref 8–27)
BUN/CREAT SERPL: 19 (ref 10–24)
C PEPTIDE SERPL-MCNC: 4.1 NG/ML (ref 1.1–4.4)
CALCIUM SERPL-MCNC: 9.4 MG/DL (ref 8.6–10.2)
CHLORIDE SERPL-SCNC: 99 MMOL/L (ref 96–106)
CO2 SERPL-SCNC: 23 MMOL/L (ref 20–29)
CREAT SERPL-MCNC: 1.13 MG/DL (ref 0.76–1.27)
CREAT UR-MCNC: 156.4 MG/DL
GLOBULIN SER CALC-MCNC: 3.1 G/DL (ref 1.5–4.5)
GLUCOSE SERPL-MCNC: 255 MG/DL (ref 65–99)
MICROALBUMIN UR-MCNC: 37.2 UG/ML
POTASSIUM SERPL-SCNC: 4.8 MMOL/L (ref 3.5–5.2)
PROT SERPL-MCNC: 7.3 G/DL (ref 6–8.5)
SODIUM SERPL-SCNC: 138 MMOL/L (ref 134–144)

## 2019-02-28 ENCOUNTER — TELEPHONE (OUTPATIENT)
Dept: ENDOCRINOLOGY | Age: 69
End: 2019-02-28

## 2019-02-28 NOTE — TELEPHONE ENCOUNTER
Per Dr. Yoli Mehta, informed pt of result note, as noted above. Pt verbalized understanding with no further questions or concerns at this time.

## 2019-02-28 NOTE — TELEPHONE ENCOUNTER
----- Message from Rohit Arriaga MD sent at 2/27/2019  2:00 PM EST -----  Notify patient that sugar is 255 which is high

## 2019-03-06 ENCOUNTER — TELEPHONE (OUTPATIENT)
Dept: ENDOCRINOLOGY | Age: 69
End: 2019-03-06

## 2019-03-06 DIAGNOSIS — E11.65 TYPE 2 DIABETES MELLITUS WITH HYPERGLYCEMIA, WITHOUT LONG-TERM CURRENT USE OF INSULIN (HCC): Primary | ICD-10-CM

## 2019-03-06 NOTE — TELEPHONE ENCOUNTER
What ever his insurance covers , all I need is glucose log or readings     Standard meters are Once touch , Accucheck and Contour , rest of the meters accuracy is questionable

## 2019-03-07 NOTE — TELEPHONE ENCOUNTER
Informed pt if insurance covers he can try but the accuracy is questionable. Per Dr Ana Laura Garcia. Pt states insurance does not cover so he'll use what he has. Also he wants to know during his visit on 03/28/2019, can he have his thyroid examined.  He states he had thyroid problems in the past.

## 2019-03-28 ENCOUNTER — HOSPITAL ENCOUNTER (OUTPATIENT)
Dept: LAB | Age: 69
Discharge: HOME | End: 2019-03-28
Payer: MEDICARE

## 2019-03-28 ENCOUNTER — OFFICE VISIT (OUTPATIENT)
Dept: ENDOCRINOLOGY | Age: 69
End: 2019-03-28

## 2019-03-28 VITALS
WEIGHT: 237 LBS | RESPIRATION RATE: 16 BRPM | OXYGEN SATURATION: 96 % | HEART RATE: 72 BPM | HEIGHT: 70 IN | BODY MASS INDEX: 33.93 KG/M2 | TEMPERATURE: 96.6 F | SYSTOLIC BLOOD PRESSURE: 170 MMHG | DIASTOLIC BLOOD PRESSURE: 90 MMHG

## 2019-03-28 DIAGNOSIS — E11.65 TYPE 2 DIABETES MELLITUS WITH HYPERGLYCEMIA, WITHOUT LONG-TERM CURRENT USE OF INSULIN (HCC): ICD-10-CM

## 2019-03-28 DIAGNOSIS — I10 ESSENTIAL HYPERTENSION: ICD-10-CM

## 2019-03-28 DIAGNOSIS — Z13.29 THYROID DISORDER SCREENING: ICD-10-CM

## 2019-03-28 DIAGNOSIS — E11.65 TYPE 2 DIABETES MELLITUS WITH HYPERGLYCEMIA, WITHOUT LONG-TERM CURRENT USE OF INSULIN (HCC): Primary | ICD-10-CM

## 2019-03-28 DIAGNOSIS — Z13.29 THYROID DISORDER SCREEN: ICD-10-CM

## 2019-03-28 PROCEDURE — 36415 COLL VENOUS BLD VENIPUNCTURE: CPT

## 2019-03-28 PROCEDURE — 84443 ASSAY THYROID STIM HORMONE: CPT

## 2019-03-28 PROCEDURE — 84439 ASSAY OF FREE THYROXINE: CPT

## 2019-03-28 RX ORDER — INSULIN PUMP SYRINGE, 3 ML
EACH MISCELLANEOUS
Qty: 1 KIT | Refills: 0 | Status: SHIPPED | OUTPATIENT
Start: 2019-03-28 | End: 2021-06-11 | Stop reason: SDUPTHER

## 2019-03-28 NOTE — LETTER
4/1/19 Patient: Akshat Jean YOB: 1950 Date of Visit: 3/28/2019 Tiesha Zhang MD 
[address] [address] [facility] [address] VIA In Basket Dear Tiesha Zhang MD, Thank you for referring Mr. Bryan Duarte to [address] [address] for evaluation. My notes for this consultation are attached. If you have questions, please do not hesitate to call me. I look forward to following your patient along with you. Sincerely, Сергей Peng MD

## 2019-03-28 NOTE — PROGRESS NOTES
[facility]               Melissa Mckeon MD        [address] [address] CP:[phone] Fax [phone]               Patient Information  Date:3/28/2019  Name : Leatha Yung 77 y.o.     YOB: 1950             Chief Complaint   Patient presents with    Diabetes    Thyroid Problem       History of Present Illness: Leatha Yung is a 77 y.o. male here for fu of  Type 2 Diabetes Mellitus. Type 2 Diabetes was diagnosed several years ago  . He was managed by endocrinologist, Dr. Rhoda Zhang in [place] in the past  He has self stopped medications  Last A1c was high, he was asked to check the blood glucose as he was not convinced regarding the A1c  Not been checking the blood glucose  Did not take the blood pressure medication  Requesting to be screened for thyroid disorder     no chest pain or shortness of breath, severe headaches. Wt Readings from Last 3 Encounters:   03/28/19 237 lb (107.5 kg)   02/25/19 234 lb (106.1 kg)   10/04/18 229 lb 11.2 oz (104.2 kg)       BP Readings from Last 3 Encounters:   03/28/19 170/90   02/25/19 (!) 178/92   10/04/18 (!) 171/95           Past Medical History:   Diagnosis Date    Diabetes ([facility]     Type 2, diet controlled    Headache     Hypertension     Sleep apnea     cpap     Current Outpatient Medications   Medication Sig    glucose blood VI test strips (ONETOUCH ULTRA TEST) strip Use as directed to check blood sugars daily. DX Code: E11.65    losartan (COZAAR) 50 mg tablet Take 1 Tab by mouth daily. Indications: hypertension (Patient taking differently: Take 50 mg by mouth daily. Takes 1/2 tab  Indications: hypertension)    predniSONE (STERAPRED DS) 10 mg dose pack 12 day DS taper pack as directed     No current facility-administered medications for this visit.       Allergies   Allergen Reactions    Erythromycin Nausea and Vomiting         Review of Systems:  -   - Gastrointestinal: no dysphagia ,  abdominal pain  - Musculoskeletal: no joint pains no  weakness  - Integumentary: no rashes  - Neurological: no numbness, tingling, no  headaches  - Psychiatric: no depression no  anxiety  - Endocrine: no heat or cold intolerance    Physical Examination:   Blood pressure 170/90, pulse 72, temperature 96.6 °F (35.9 °C), temperature source Oral, resp. rate 16, height 5' 10\" (1.778 m), weight 237 lb (107.5 kg), SpO2 96 %. Estimated body mass index is 34.01 kg/m² as calculated from the following:    Height as of this encounter: 5' 10\" (1.778 m). -   Weight as of this encounter: 237 lb (107.5 kg). - General: pleasant, no distress, good eye contact  - HEENT: no pallor, no periorbital edema, EOMI  - Neck: supple, no thyromegaly, no nodules  - Cardiovascular: regular, normal rate, normal S1 and S2,   - Respiratory: clear to auscultation bilaterally  - Gastrointestinal: soft, nontender, nondistended,   - Neurological: alert and oriented  - Psychiatric: normal mood and affect  - Skin: color, texture, turgor normal.       Diabetic foot exam: June 2018    Left Foot:   Visual Exam: callus    Pulse DP: 2+ (normal)   Filament test: Decreased sensation    Vibratory sensation: ND      Right Foot:   Visual Exam: callous - bilateral    Pulse DP: 2+ (normal)   Filament test: Decreased sensation    Vibratory sensation: ND           Data Reviewed:          [facility] 8/16    Assessment/Plan:     1. Type 2 diabetes mellitus with hyperglycemia, without long-term current use of insulin ([facility]    2. Essential hypertension        1. Type 2 Diabetes Mellitus   Lab Results   Component Value Date/Time    Hemoglobin A1c 7.2 (H) 06/01/2018 11:30 AM    Hemoglobin A1c (POC) 11.7 02/25/2019 10:18 AM    Hemoglobin A1c, External 9.2 01/19/2016     Refused medications  Discussed the risks of untreated diabetes mellitus  He agreed to check the blood glucose and if it is elevated will call us in a week,    2.   HTN : Blood pressure is elevated  Compliance with the medications discussed, low-salt diet  Risks of untreated hypertension discussed  Follow-up with Dr. Gabriel Garcia        4. Obesity:Body mass index is 34.01 kg/m². Discussed about the importance of exercise and carbohydrate portion control. JOVANY     Subclinical hypothyroidism    There are no Patient Instructions on file for this visit. Thank you for allowing me to participate in the care of this patient.     Natacha Almeida MD      Patient verbalized understanding

## 2019-03-28 NOTE — PROGRESS NOTES
Jennifer Hernandez is a 77 y.o. male here for   Chief Complaint   Patient presents with    Diabetes    Thyroid Problem       Functional glucose monitor and record keeping system? - did not bring today  Eye exam within last year? - on file   Foot exam within last year? - on file    1. Have you been to the ER, urgent care clinic since your last visit? Hospitalized since your last visit? - no    2. Have you seen or consulted any other health care providers outside of the [facility] since your last visit?   Include any pap smears or colon screening.- no

## 2019-03-29 LAB
T4 FREE SERPL-MCNC: 1.03 NG/DL (ref 0.82–1.77)
TSH SERPL DL<=0.005 MIU/L-ACNC: 4.93 UIU/ML (ref 0.45–4.5)

## 2019-05-29 NOTE — PROGRESS NOTES
Clark Juan is a 77 y.o. male here for   Chief Complaint   Patient presents with    Diabetes       Functional glucose monitor and record keeping system? -yes   Eye exam within last year? - on file  Foot exam within last year? - due soon    1. Have you been to the ER, urgent care clinic since your last visit? Hospitalized since your last visit? -no    2. Have you seen or consulted any other health care providers outside of the [facility] since your last visit?   Include any pap smears or colon screening.-no

## 2019-05-30 ENCOUNTER — OFFICE VISIT (OUTPATIENT)
Dept: ENDOCRINOLOGY | Age: 69
End: 2019-05-30

## 2019-05-30 VITALS
WEIGHT: 224 LBS | HEART RATE: 60 BPM | BODY MASS INDEX: 32.07 KG/M2 | OXYGEN SATURATION: 92 % | RESPIRATION RATE: 14 BRPM | DIASTOLIC BLOOD PRESSURE: 71 MMHG | SYSTOLIC BLOOD PRESSURE: 125 MMHG | TEMPERATURE: 97 F | HEIGHT: 70 IN

## 2019-05-30 DIAGNOSIS — E03.8 SUBCLINICAL HYPOTHYROIDISM: ICD-10-CM

## 2019-05-30 DIAGNOSIS — I10 ESSENTIAL HYPERTENSION: ICD-10-CM

## 2019-05-30 DIAGNOSIS — E11.65 TYPE 2 DIABETES MELLITUS WITH HYPERGLYCEMIA, WITHOUT LONG-TERM CURRENT USE OF INSULIN (HCC): Primary | ICD-10-CM

## 2019-05-30 LAB — HBA1C MFR BLD HPLC: 8.6 %

## 2019-05-30 NOTE — PROGRESS NOTES
[facility]               Jose Cruz De León MD        [address] 78 [phone] Fax [phone]               Patient Information  Date:5/30/2019  Name : Charlestine Goodell 77 y.o.     YOB: 1950             Chief Complaint   Patient presents with    Diabetes       History of Present Illness: Charlestine Goodell is a 77 y.o. male here for fu of  Type 2 Diabetes Mellitus. Type 2 Diabetes was diagnosed several years ago  . He was managed by endocrinologist, Dr. Lyna Dakins in [place] in the past  Taking the blood glucose 1-2 times daily, PTH less than 150  He does not believe any medications, \"metformin and other medications for diabetes are very bad\"  Did not take the blood pressure medication       no chest pain or shortness of breath, severe headaches. Wt Readings from Last 3 Encounters:   03/28/19 237 lb (107.5 kg)   02/25/19 234 lb (106.1 kg)   10/04/18 229 lb 11.2 oz (104.2 kg)       BP Readings from Last 3 Encounters:   03/28/19 170/90   02/25/19 (!) 178/92   10/04/18 (!) 171/95           Past Medical History:   Diagnosis Date    Diabetes ([facility]     Type 2, diet controlled    Headache     Hypertension     Sleep apnea     cpap     Current Outpatient Medications   Medication Sig    glucose blood VI test strips (BLOOD GLUCOSE TEST) strip Use to check BG once daily. E11.65    Blood-Glucose Meter monitoring kit Use to check BG once daily. E11.65    predniSONE (STERAPRED DS) 10 mg dose pack 12 day DS taper pack as directed    losartan (COZAAR) 50 mg tablet Take 1 Tab by mouth daily. Indications: hypertension (Patient taking differently: Take 50 mg by mouth daily. Takes 1/2 tab  Indications: hypertension)     No current facility-administered medications for this visit.       Allergies   Allergen Reactions    Erythromycin Nausea and Vomiting         Review of Systems:  -   - Gastrointestinal: no dysphagia ,  abdominal pain  - Musculoskeletal: no joint pains no  weakness  - Integumentary: no rashes  - Neurological: no numbness, tingling, no  headaches  - Psychiatric: no depression no  anxiety  - Endocrine: no heat or cold intolerance    Physical Examination:   Height 5' 10\" (1.778 m). Estimated body mass index is 34.01 kg/m² as calculated from the following:    Height as of this encounter: 5' 10\" (1.778 m). -   Weight as of 3/28/19: 237 lb (107.5 kg). - General: pleasant, no distress, good eye contact  - HEENT: no pallor, no periorbital edema, EOMI  - Neck: supple, no thyromegaly, no nodules  - Cardiovascular: regular, normal rate, normal S1 and S2,   - Respiratory: clear to auscultation bilaterally  - Gastrointestinal: soft, nontender, nondistended,   - Neurological: alert and oriented  - Psychiatric: normal mood and affect  - Skin: color, texture, turgor normal.       Diabetic foot exam: June 2018    Left Foot:   Visual Exam: callus    Pulse DP: 2+ (normal)   Filament test: Decreased sensation    Vibratory sensation: ND      Right Foot:   Visual Exam: callous - bilateral    Pulse DP: 2+ (normal)   Filament test: Decreased sensation    Vibratory sensation: ND           Data Reviewed:          [facility] 8/16    Assessment/Plan:     1. Type 2 diabetes mellitus with hyperglycemia, without long-term current use of insulin ([address]    2. Essential hypertension        1. Type 2 Diabetes Mellitus   Lab Results   Component Value Date/Time    Hemoglobin A1c 7.2 (H) 06/01/2018 11:30 AM    Hemoglobin A1c (POC) 11.7 02/25/2019 10:18 AM    Hemoglobin A1c, External 9.2 01/19/2016     discussed the myths and facts of metformin, counseled    Discussed the risks of untreated diabetes mellitus  He does not want any medications    2. HTN :   Compliance with the medications discussed, low-salt diet  Risks of untreated hypertension discussed  Follow-up with Dr. Jim Ozuna        4. Obesity:Body mass index is 34.01 kg/m².   Discussed about the importance of exercise and carbohydrate portion control. JOVANY     Subclinical hypothyroidism- no therapy indicated    There are no Patient Instructions on file for this visit. Thank you for allowing me to participate in the care of this patient.     Ernesto Peñaloza MD      Patient verbalized understanding

## 2019-05-30 NOTE — LETTER
5/30/19 Patient: Halle Oneal YOB: 1950 Date of Visit: 5/30/2019 Missy Palumbo MD 
[address] [address] [address] [address] VIA In Basket Dear Missy Palumbo MD, Thank you for referring Mr. Bryan Duarte to [address] [place] [address] [address] for evaluation. My notes for this consultation are attached. If you have questions, please do not hesitate to call me. I look forward to following your patient along with you. Sincerely, Romeo Walton MD

## 2019-06-11 ENCOUNTER — TELEPHONE (OUTPATIENT)
Dept: ENDOCRINOLOGY | Age: 69
End: 2019-06-11

## 2019-06-11 DIAGNOSIS — I10 ESSENTIAL HYPERTENSION: ICD-10-CM

## 2019-06-11 DIAGNOSIS — E11.65 TYPE 2 DIABETES MELLITUS WITH HYPERGLYCEMIA, WITHOUT LONG-TERM CURRENT USE OF INSULIN (HCC): Primary | ICD-10-CM

## 2019-06-11 RX ORDER — LOSARTAN POTASSIUM 50 MG/1
TABLET ORAL
Qty: 90 TAB | Refills: 3 | Status: SHIPPED | OUTPATIENT
Start: 2019-06-11 | End: 2020-10-10

## 2019-06-11 NOTE — TELEPHONE ENCOUNTER
Patient is wanting rx for one touch lancets states he has been buying over the counter and wants to see if rx ins will cover.  He discussed test strips and Losartan although he has already contacted pharmacy to send refill request.

## 2019-06-12 RX ORDER — LANCETS 33 GAUGE
EACH MISCELLANEOUS
Qty: 50 LANCET | Refills: 11 | Status: SHIPPED | OUTPATIENT
Start: 2019-06-12 | End: 2020-04-15 | Stop reason: SDUPTHER

## 2019-07-30 ENCOUNTER — DOCUMENTATION ONLY (OUTPATIENT)
Dept: SLEEP MEDICINE | Age: 69
End: 2019-07-30

## 2019-07-30 ENCOUNTER — OFFICE VISIT (OUTPATIENT)
Dept: SLEEP MEDICINE | Age: 69
End: 2019-07-30

## 2019-07-30 VITALS
BODY MASS INDEX: 32.48 KG/M2 | OXYGEN SATURATION: 97 % | DIASTOLIC BLOOD PRESSURE: 75 MMHG | WEIGHT: 226.9 LBS | SYSTOLIC BLOOD PRESSURE: 131 MMHG | HEART RATE: 64 BPM | HEIGHT: 70 IN

## 2019-07-30 DIAGNOSIS — G47.33 OSA (OBSTRUCTIVE SLEEP APNEA): Primary | ICD-10-CM

## 2019-07-30 NOTE — PROGRESS NOTES
217 [facility] [place]. [place], [address]  Tel.  [phone]  Fax. 100 [facility] 60  [place], 200 [facility]  Tel.  [phone]  Fax. [phone] [facility]   Tel.  [phone]  Fax. [phone]         Chief Complaint       Chief Complaint   Patient presents with    Sleep Problem     yearly follow up         HPI        Gaston Arana is a 68 y.o. male seen for follow-up. He was evaluated with a sleep study demonstrating mild sleep disordered breathing characterized by an AHI of 6/h. Has been started on APAP 5- 15 cm. Compliance data downloaded and reviewed in detail with the patient today. During the past 30 days, APAP used during 30 days with the average daily use of 8.9 hours. CMS compliance criteria 97%. AHI 1.9 per hour. He notes he is sleeping well with APAP, not experiencing nonrestorative sleep or daytime fatigue. Allergies   Allergen Reactions    Erythromycin Nausea and Vomiting       Current Outpatient Medications   Medication Sig Dispense Refill    lancets (ONE TOUCH DELICA) 33 gauge misc Use to check BG once daily. Dx code E11.65 50 Lancet 11    losartan (COZAAR) 50 mg tablet TAKE ONE TABLET BY MOUTH ONCE DAILY FOR  HYPERTENSION 90 Tab 3    glucose blood VI test strips (BLOOD GLUCOSE TEST) strip Use to check BG once daily. E11.65 50 Strip 11    Blood-Glucose Meter monitoring kit Use to check BG once daily. E11.65 1 Kit 0        He  has a past medical history of Diabetes ([address] Headache, Hypertension, Sleep apnea, and Subclinical hypothyroidism (5/30/2019).  He also has no past medical history of Anemia, Arrhythmia, Arthritis, Asthma, Autoimmune disease ([address] CAD (coronary artery disease), Calculus of kidney, Cancer ([address] Chronic kidney disease, Chronic obstructive pulmonary disease ([address] Chronic pain, Congestive heart failure ([address] Contact dermatitis and other eczema, due to unspecified cause, Depression, GERD (gastroesophageal reflux disease), Hypercholesterolemia, Liver disease, Malignant hyperthermia due to anesthesia, Psychotic disorder ([facility] PUD (peptic ulcer disease), Seizures ([facility] Stroke ([facility] Thromboembolus ([facility] or Trauma. He  has a past surgical history that includes hx hernia repair (Bilateral). He family history includes Cancer in his mother; Heart Disease in his father. He  reports that he has never smoked. He has never used smokeless tobacco. He reports that he drinks alcohol. He reports that he does not use drugs. Review of Systems:  Unchanged per patient      Objective:     Visit Vitals  /75 (BP 1 Location: Left arm)   Pulse 64   Ht 5' 10\" (1.778 m)   Wt 226 lb 14.4 oz (102.9 kg)   SpO2 97%   BMI 32.56 kg/m²     Body mass index is 32.56 kg/m². Assessment:       ICD-10-CM ICD-9-CM    1. JOVANY (obstructive sleep apnea) G47.33 327.23 AMB SUPPLY ORDER     Sleep disordered breathing responding well to APAP. He will continue with the current pressure settings. Follow-up appointment will be scheduled. he is compliant with PAP therapy and PAP continues to benefit patient and remains necessary for control of his sleep apnea. Plan:     Orders Placed This Encounter    AMB SUPPLY ORDER     Diagnosis: Obstructive Sleep Apnea ICD-10 Code (G47.33)         CPAP mask and supplies:  Patient preference, headgear, heated tubing, and filter;  heated humidifier. Wireless modem. Remote monitoring enrollment.  Oral/Nasal Combo Mask 1 every 3 months.  Oral Cushion Combo Mask (Replace) 2 per month.  Nasal Pillows Combo Mask (Replace) 2 per month.  Full Face Mask 1 every 3 months.  Full Face Mask Cushion 1 per month.  Nasal Cushion (Replace) 2 per month.  Nasal Pillows (Replace) 2 per month.  Nasal Interface Mask 1 every 3 months.  Headgear 1 every 6 months.  Chinstrap 1 every 6 months.    Tubing 1 every 3 months.  Filter(s) Disposable 2 per month.  Filter(s) Non-Disposable 1 every 6 months.  Oral Interface 1 every 3 months. [address] for Yves Steven (Replace) 1 every 6 months.  Tubing with heating element 1 every 3 months.                 Ramone Aburto MD, [facility]  Diplomate, American Board of Sleep Medicine  NPI [id number]  Electronically signed 7/30/19       * Patient has a history and examination consistent with the diagnosis of sleep apnea. * He was provided information on sleep apnea including corresponding risk factors and the importance of proper treatment. * Treatment options if indicated were reviewed today. Ramone Aburto MD, [facility]  Electronically signed 07/30/19        This note was created using voice recognition software. Despite editing, there may be syntax errors. This note will not be viewable in [address]

## 2019-07-30 NOTE — PATIENT INSTRUCTIONS

## 2019-09-12 ENCOUNTER — HOSPITAL ENCOUNTER (OUTPATIENT)
Dept: LAB | Age: 69
Discharge: HOME | End: 2019-09-12
Payer: MEDICARE

## 2019-09-12 DIAGNOSIS — I10 ESSENTIAL HYPERTENSION: ICD-10-CM

## 2019-09-12 DIAGNOSIS — E11.65 TYPE 2 DIABETES MELLITUS WITH HYPERGLYCEMIA, WITHOUT LONG-TERM CURRENT USE OF INSULIN (HCC): ICD-10-CM

## 2019-09-12 PROCEDURE — 83036 HEMOGLOBIN GLYCOSYLATED A1C: CPT

## 2019-09-12 PROCEDURE — 36415 COLL VENOUS BLD VENIPUNCTURE: CPT

## 2019-09-12 PROCEDURE — 80053 COMPREHEN METABOLIC PANEL: CPT

## 2019-09-13 LAB
ALBUMIN SERPL-MCNC: 4 G/DL (ref 3.5–4.8)
ALBUMIN/GLOB SERPL: 1.5 {RATIO} (ref 1.2–2.2)
ALP SERPL-CCNC: 54 IU/L (ref 39–117)
ALT SERPL-CCNC: 15 IU/L (ref 0–44)
AST SERPL-CCNC: 17 IU/L (ref 0–40)
BILIRUB SERPL-MCNC: 0.7 MG/DL (ref 0–1.2)
BUN SERPL-MCNC: 23 MG/DL (ref 8–27)
BUN/CREAT SERPL: 22 (ref 10–24)
CALCIUM SERPL-MCNC: 8.8 MG/DL (ref 8.6–10.2)
CHLORIDE SERPL-SCNC: 103 MMOL/L (ref 96–106)
CO2 SERPL-SCNC: 23 MMOL/L (ref 20–29)
CREAT SERPL-MCNC: 1.03 MG/DL (ref 0.76–1.27)
EST. AVERAGE GLUCOSE BLD GHB EST-MCNC: 148 MG/DL
GLOBULIN SER CALC-MCNC: 2.6 G/DL (ref 1.5–4.5)
GLUCOSE SERPL-MCNC: 108 MG/DL (ref 65–99)
HBA1C MFR BLD: 6.8 % (ref 4.8–5.6)
POTASSIUM SERPL-SCNC: 4.5 MMOL/L (ref 3.5–5.2)
PROT SERPL-MCNC: 6.6 G/DL (ref 6–8.5)
SODIUM SERPL-SCNC: 139 MMOL/L (ref 134–144)

## 2019-09-18 NOTE — PROGRESS NOTES
Ehsan Bateman is a 68 y.o. male here for   Chief Complaint   Patient presents with    Diabetes    Diabetic Foot Exam    Thyroid Problem       Functional glucose monitor and record keeping system? - yes  Eye exam within last year? - on file  Foot exam within last year? - due    1. Have you been to the ER, urgent care clinic since your last visit? Hospitalized since your last visit? -no    2. Have you seen or consulted any other health care providers outside of the [address] [place] [address] since your last visit?   Include any pap smears or colon screening.-no

## 2019-09-19 ENCOUNTER — OFFICE VISIT (OUTPATIENT)
Dept: ENDOCRINOLOGY | Age: 69
End: 2019-09-19

## 2019-09-19 VITALS
HEART RATE: 66 BPM | BODY MASS INDEX: 31.78 KG/M2 | WEIGHT: 222 LBS | DIASTOLIC BLOOD PRESSURE: 61 MMHG | HEIGHT: 70 IN | RESPIRATION RATE: 16 BRPM | TEMPERATURE: 97.3 F | OXYGEN SATURATION: 95 % | SYSTOLIC BLOOD PRESSURE: 117 MMHG

## 2019-09-19 DIAGNOSIS — I10 ESSENTIAL HYPERTENSION: ICD-10-CM

## 2019-09-19 DIAGNOSIS — E11.65 TYPE 2 DIABETES MELLITUS WITH HYPERGLYCEMIA, WITHOUT LONG-TERM CURRENT USE OF INSULIN (HCC): Primary | ICD-10-CM

## 2019-09-19 DIAGNOSIS — E03.8 SUBCLINICAL HYPOTHYROIDISM: ICD-10-CM

## 2019-09-19 NOTE — PROGRESS NOTES
[facility]               Ryan Calero MD        [address] 78 [phone] Fax [phone]               Patient Information  Date:9/19/2019  Name : Michaelle Roberto 68 y.o.     YOB: 1950             Chief Complaint   Patient presents with    Diabetes    Diabetic Foot Exam    Thyroid Problem       History of Present Illness: Michaelle Roberto is a 68 y.o. male here for fu of  Type 2 Diabetes Mellitus. Type 2 Diabetes was diagnosed several years ago  . He was managed by endocrinologist, Dr. Jayy Chavez in [place] in the past    He does not believe in any medications, \"metformin and other medications for diabetes are very bad\"  He changed the diet, increased activity and blood glucose has improved       no chest pain or shortness of breath, severe headaches. Wt Readings from Last 3 Encounters:   09/19/19 222 lb (100.7 kg)   07/30/19 226 lb 14.4 oz (102.9 kg)   05/30/19 224 lb (101.6 kg)       BP Readings from Last 3 Encounters:   09/19/19 117/61   07/30/19 131/75   05/30/19 125/71           Past Medical History:   Diagnosis Date    Diabetes ([facility]     Type 2, diet controlled    Headache     Hypertension     Sleep apnea     cpap    Subclinical hypothyroidism 5/30/2019     Current Outpatient Medications   Medication Sig    lancets (ONE TOUCH DELICA) 33 gauge misc Use to check BG once daily. Dx code E11.65    losartan (COZAAR) 50 mg tablet TAKE ONE TABLET BY MOUTH ONCE DAILY FOR  HYPERTENSION    glucose blood VI test strips (BLOOD GLUCOSE TEST) strip Use to check BG once daily. E11.65    Blood-Glucose Meter monitoring kit Use to check BG once daily. E11.65     No current facility-administered medications for this visit.       Allergies   Allergen Reactions    Erythromycin Nausea and Vomiting         Review of Systems:  -   - Gastrointestinal: no dysphagia ,  abdominal pain  - Musculoskeletal: no joint pains no weakness  - Integumentary: no rashes  - Neurological: no numbness, tingling, no  headaches  - Psychiatric: no depression no  anxiety  - Endocrine: no heat or cold intolerance    Physical Examination:   Blood pressure 117/61, pulse 66, temperature 97.3 °F (36.3 °C), temperature source Oral, resp. rate 16, height 5' 10\" (1.778 m), weight 222 lb (100.7 kg), SpO2 95 %. Estimated body mass index is 31.85 kg/m² as calculated from the following:    Height as of this encounter: 5' 10\" (1.778 m). -   Weight as of this encounter: 222 lb (100.7 kg). - General: pleasant, no distress, good eye contact  - HEENT: no pallor, no periorbital edema, EOMI  - Neck: supple, no thyromegaly, no nodules  - Cardiovascular: regular, normal rate, normal S1 and S2,   - Respiratory: clear to auscultation bilaterally  - Gastrointestinal: soft, nontender, nondistended,   - Neurological: alert and oriented  - Psychiatric: normal mood and affect  - Skin: color, texture, turgor normal.       Diabetic foot exam: September 2019    Left Foot:   Visual Exam: callus    Pulse DP: 2+ (normal)   Filament test: Decreased sensation    Vibratory sensation: ND      Right Foot:   Visual Exam: callous - bilateral    Pulse DP: 2+ (normal)   Filament test: Decreased sensation    Vibratory sensation: ND           Data Reviewed:          [facility] 8/16    Assessment/Plan:     1. Type 2 diabetes mellitus with hyperglycemia, without long-term current use of insulin (HCC)    2. Subclinical hypothyroidism    3. Essential hypertension        1. Type 2 Diabetes Mellitus   Lab Results   Component Value Date/Time    Hemoglobin A1c 6.8 (H) 09/12/2019 09:44 AM    Hemoglobin A1c (POC) 8.6 05/30/2019 09:58 AM    Hemoglobin A1c, External 9.2 01/19/2016   Commended on the lifestyle changes and good glycemic control. Recommended to continue the same lifestyle so he does not have to take the medications. discussed the myths and facts of metformin, counseled      2.   HTN : Compliance with the medications discussed, low-salt diet  Risks of untreated hypertension discussed  Follow-up with Dr. Cristofer Bundy    3. Declined statin    4. Obesity:Body mass index is 31.85 kg/m². Discussed about the importance of exercise and carbohydrate portion control. JOVANY     Subclinical hypothyroidism- no therapy indicated    There are no Patient Instructions on file for this visit. Thank you for allowing me to participate in the care of this patient.     Bull Peña MD      Patient verbalized understanding

## 2019-09-19 NOTE — LETTER
9/20/19 Patient: Thor Lawrence YOB: 1950 Date of Visit: 9/19/2019 Elizabeth Malave MD 
[address] [address] [place] [address] VIA In Basket Dear Elizabeth Malave MD, Thank you for referring Mr. Bryan Duarte to [id number] [place] [address] for evaluation. My notes for this consultation are attached. If you have questions, please do not hesitate to call me. I look forward to following your patient along with you. Sincerely, Juan Morley MD

## 2020-01-28 ENCOUNTER — TELEPHONE (OUTPATIENT)
Dept: ENDOCRINOLOGY | Age: 70
End: 2020-01-28

## 2020-01-28 NOTE — TELEPHONE ENCOUNTER
----- Message from He Lorenzo sent at 1/28/2020  3:22 PM EST -----  Regarding: Dr. Maria Teresa Randall first and last name:      Reason for call: Verify if he needs to fast for A1c testing at tomorrow's appt.        Callback required yes/no and why: yes      Best contact number(s): [phone]      Details to clarify the request:      He Lorenzo

## 2020-01-28 NOTE — TELEPHONE ENCOUNTER
Attempted to call. Unsuccessful. Pt's full name on VM. Left msg informing pt he does not have to fast for his A1C. A callback number was left for wandy questions or concerns.

## 2020-01-28 NOTE — PROGRESS NOTES
Charlestine Goodell is a 68 y.o. male here for   Chief Complaint   Patient presents with    Diabetes       Functional glucose monitor and record keeping system? -yes   Eye exam within last year? - on file  Foot exam within last year? - on file    1. Have you been to the ER, urgent care clinic since your last visit? Hospitalized since your last visit? -no    2. Have you seen or consulted any other health care providers outside of the [address] [place] [address] since your last visit?   Include any pap smears or colon screening.-no

## 2020-01-29 ENCOUNTER — OFFICE VISIT (OUTPATIENT)
Dept: ENDOCRINOLOGY | Age: 70
End: 2020-01-29

## 2020-01-29 VITALS
OXYGEN SATURATION: 94 % | WEIGHT: 233 LBS | TEMPERATURE: 97.3 F | DIASTOLIC BLOOD PRESSURE: 82 MMHG | RESPIRATION RATE: 18 BRPM | SYSTOLIC BLOOD PRESSURE: 179 MMHG | HEIGHT: 70 IN | HEART RATE: 65 BPM | BODY MASS INDEX: 33.36 KG/M2

## 2020-01-29 DIAGNOSIS — E11.65 TYPE 2 DIABETES MELLITUS WITH HYPERGLYCEMIA, WITHOUT LONG-TERM CURRENT USE OF INSULIN (HCC): Primary | ICD-10-CM

## 2020-01-29 DIAGNOSIS — E03.8 SUBCLINICAL HYPOTHYROIDISM: ICD-10-CM

## 2020-01-29 DIAGNOSIS — I10 ESSENTIAL HYPERTENSION: ICD-10-CM

## 2020-01-29 LAB
GLUCOSE POC: 292 MG/DL
HBA1C MFR BLD HPLC: 8.4 %

## 2020-01-29 NOTE — PROGRESS NOTES
Preeti Padron MD          Patient Information  Date:1/29/2020  Name : Grant Raya 68 y.o.     YOB: 1950             Chief Complaint   Patient presents with    Diabetes    Thyroid Problem       History of Present Illness: Grant Raya is a 68 y.o. male here for fu of  Type 2 Diabetes Mellitus. Type 2 Diabetes was diagnosed several years ago  . He was managed by endocrinologist, Dr. Disha Martinze in [place] in the past    He does not believe in any medications, \"metformin and other medications for diabetes are very bad\"  He was controlling it with diet, this visit A1c has increased  He has gained weight, diet is off       no chest pain or shortness of breath, severe headaches. Wt Readings from Last 3 Encounters:   01/29/20 233 lb (105.7 kg)   09/19/19 222 lb (100.7 kg)   07/30/19 226 lb 14.4 oz (102.9 kg)       BP Readings from Last 3 Encounters:   01/29/20 179/82   09/19/19 117/61   07/30/19 131/75           Past Medical History:   Diagnosis Date    Diabetes (HCC)     Type 2, diet controlled    Headache     Hypertension     Sleep apnea     cpap    Subclinical hypothyroidism 5/30/2019     Current Outpatient Medications   Medication Sig    lancets (ONE TOUCH DELICA) 33 gauge misc Use to check BG once daily. Dx code E11.65    losartan (COZAAR) 50 mg tablet TAKE ONE TABLET BY MOUTH ONCE DAILY FOR  HYPERTENSION    glucose blood VI test strips (BLOOD GLUCOSE TEST) strip Use to check BG once daily. E11.65    Blood-Glucose Meter monitoring kit Use to check BG once daily. E11.65     No current facility-administered medications for this visit.       Allergies   Allergen Reactions    Erythromycin Nausea and Vomiting         Review of Systems:  -   - Gastrointestinal: no dysphagia ,  abdominal pain  - Musculoskeletal: no joint pains no  weakness  - Integumentary: no rashes  - Neurological: no numbness, tingling, no headaches  - Psychiatric: no depression no  anxiety  - Endocrine: no heat or cold intolerance    Physical Examination:   Blood pressure 179/82, pulse 65, temperature 97.3 °F (36.3 °C), temperature source Oral, resp. rate 18, height 5' 10\" (1.778 m), weight 233 lb (105.7 kg), SpO2 94 %. Estimated body mass index is 33.43 kg/m² as calculated from the following:    Height as of this encounter: 5' 10\" (1.778 m). -   Weight as of this encounter: 233 lb (105.7 kg). - General: pleasant, no distress, good eye contact  - HEENT: no pallor, no periorbital edema, EOMI  - Neck: supple,  - Cardiovascular: regular, normal rate, normal S1 and S2,   - Respiratory: clear to auscultation bilaterally  - Gastrointestinal: soft, nontender, nondistended,   - Neurological: alert and oriented  - Psychiatric: normal mood and affect  - Skin: color, texture, turgor normal.       Diabetic foot exam: September 2019    Left Foot:   Visual Exam: callus    Pulse DP: 2+ (normal)   Filament test: Decreased sensation    Vibratory sensation: ND      Right Foot:   Visual Exam: callous - bilateral    Pulse DP: 2+ (normal)   Filament test: Decreased sensation    Vibratory sensation: ND           Data Reviewed:          [facility] 8/16    Assessment/Plan:     1. Type 2 diabetes mellitus with hyperglycemia, without long-term current use of insulin (HCC)    2. Subclinical hypothyroidism    3. Essential hypertension        1. Type 2 Diabetes Mellitus   Lab Results   Component Value Date/Time    Hemoglobin A1c 6.8 (H) 09/12/2019 09:44 AM    Hemoglobin A1c (POC) 8.4 01/29/2020 11:21 AM    Hemoglobin A1c, External 9.2 01/19/2016   Uncontrolled  He does not believe in medications, wants to try to control it with lifestyle changes      2. HTN :  Declined medications  Compliance with the medications discussed, low-salt diet  Risks of untreated hypertension discussed  Follow-up with PCP    3. Declined statin    4. Obesity:Body mass index is 33.43 kg/m².   Discussed about the importance of exercise and carbohydrate portion control. JOVANY     Subclinical hypothyroidism- no therapy indicated    There are no Patient Instructions on file for this visit. Thank you for allowing me to participate in the care of this patient.     Ariadna Oliva MD      Patient verbalized understanding

## 2020-01-29 NOTE — LETTER
1/30/20 Patient: Tim Garrison YOB: 1950 Date of Visit: 1/29/2020 Aniket Newton MD 
[address] [address] [place] [address] VIA In Basket Dear Aniket Newton MD, Thank you for referring Mr. Bryan Duarte to [address] [address] for evaluation. My notes for this consultation are attached. If you have questions, please do not hesitate to call me. I look forward to following your patient along with you. Sincerely, Thomas Velasco MD

## 2020-03-16 NOTE — MR AVS SNAPSHOT
[address] [place] [address] 
[phone] Patient: Martha Shaw MRN:  [id number] Visit Information Date & Time Provider Department Dept. Phone Encounter #  
 5/30/2018  3:15 PM Basilia Hough MD [address] [facility] [phone] [id number] Follow-up Instructions Return if symptoms worsen or fail to improve. Your Appointments 6/1/2018 10:00 AM  
ROUTINE CARE with Tom Alas MD  
[facility]) Appt Note: F/up DM 05/30/18  
 [address] [facility] [address]  
[phone]  
  
   
 [phone]  
  
    
 7/24/2018  9:20 AM  
Any with Peter Peng MD  
[address] ([facility]) Appt Note: 1 year CPAP follow up_ last seen 7/27/17 by Dr Rere Elaine; 1 year CPAP follow up - Dr Rere Elaine patient. [address] [facility] 99 [phone] [address] [facility] [phone] Upcoming Health Maintenance Date Due ZOSTER VACCINE AGE 60> 5/16/2008 Pneumococcal 65+ High/Highest Risk (1 of 2 - PCV13) 7/16/2013 MICROALBUMIN Q1 2/17/2018 LIPID PANEL Q1 2/17/2018 EYE EXAM RETINAL OR DILATED Q1 4/21/2018 HEMOGLOBIN A1C Q6M 6/21/2018 FOOT EXAM Q1 7/25/2018 MEDICARE YEARLY EXAM 7/26/2018 Influenza Age 5 to Adult 8/1/2018 GLAUCOMA SCREENING Q2Y 4/21/2019 COLONOSCOPY 7/7/2025 DTaP/Tdap/Td series (2 - Td) 7/25/2027 Allergies as of 5/30/2018  Review Complete On: 5/30/2018 By: Basilia Hough MD  
  
 Severity Noted Reaction Type Reactions Erythromycin  05/12/2014    Nausea and Vomiting Current Immunizations  Never Reviewed No immunizations on file. Not reviewed this visit You Were Diagnosed With   
  
 Codes Comments Rash    -  Primary ICD-10-CM: R21 
ICD-9-CM: 782.1 [facility]  Progress Note    3/16/2020 12:36 PM  Subjective: Interval History: Pt is tolerating full liquids, her diet has been advanced to full. Pt has improvement of her cough. Diet: PN-Adult 2-in-1 Central Line (Standard)  DIET RENAL;    Medications:   Reviewed medications    Labs:   CBC:   Recent Labs     03/16/20  0516   WBC 14.3*   HGB 9.3*        BMP:    Recent Labs     03/16/20  0516      K 4.3      CO2 22   BUN 55*   CREATININE 1.36*   GLUCOSE 193*     Hepatic:   Recent Labs     03/15/20  0531   AST 16   ALT <5*   BILITOT 0.58   ALKPHOS 119*     Troponin: No results for input(s): TROPONINI in the last 72 hours. BNP: No results for input(s): BNP in the last 72 hours. Lipids: No results for input(s): CHOL, HDL in the last 72 hours. Invalid input(s): LDLCALCU  INR: No results for input(s): INR in the last 72 hours.       Objective:   Vitals: /60   Pulse 56   Temp 97.4 °F (36.3 °C) (Oral)   Resp 16   Ht 5' 5\" (1.651 m)   Wt (!) 355 lb 9.6 oz (161.3 kg)   SpO2 96%   BMI 59.18 kg/m²   General appearance: alert and cooperative with exam  Neck: no adenopathy, no carotid bruit, no JVD, supple, symmetrical, trachea midline and thyroid not enlarged, symmetric, no tenderness/mass/nodules  Lungs: clear to auscultation bilaterally  Heart: regular rate and rhythm, S1, S2 normal, no murmur, click, rub or gallop  Abdomen: soft, non-tender; bowel sounds normal; no masses,  no organomegaly  Extremities: extremities normal, atraumatic, no cyanosis or edema  Neurologic: Mental status: Alert, oriented, thought content appropriate    Assessment:     Patient Active Problem List    Diagnosis Date Noted    Postprocedural hypotension 03/12/2020    Acute kidney injury (MIGUE) with acute tubular necrosis (ATN) ([facility] 03/12/2020    Arthritis of both knees 03/12/2020    Chronic obstructive pulmonary disease ([address] 03/12/2020    Colostomy present ([address] 03/12/2020    Disability of walking 03/12/2020    Lymphedema 03/12/2020    Obstructive sleep apnea syndrome 03/12/2020    Severe depression ([facility] 03/12/2020    Stage 2 chronic kidney disease 03/12/2020    Morbid obesity with BMI of 50.0-59.9, adult ([facility]     Status post total left knee replacement 11/14/2019    Primary osteoarthritis of right knee 11/14/2019    Open wound of left knee 10/30/2019    Delayed surgical wound healing 10/30/2019    Hyperkalemia 09/19/2019    Acute gastritis without hemorrhage 09/19/2019    Primary osteoarthritis of left knee 09/17/2019    Urinary tract infection without hematuria 05/24/2019    Acute kidney injury ([facility] 05/23/2019    Anasarca 05/21/2019    Intermittent asthma 05/02/2017    Essential hypertension     SBO (small bowel obstruction) ([facility] 05/01/2017    Ventral hernia 05/01/2017    Primary cough headache 04/01/2017    Asthmatic bronchitis with acute exacerbation 03/31/2017    Primary osteoarthritis of both knees 03/31/2017        Plan:   1. Cont same.  D/KAYCEE Sierra    Electronically signed by Rachna Gutiérrez MD on 3/16/2020 at 12:36 PM Essential hypertension     ICD-10-CM: I10 
ICD-9-CM: 401.9 Vitals BP Pulse Temp Resp Height(growth percentile) Weight(growth percentile) 153/81 84 98 °F (36.7 °C) (Oral) 18 5' 10\" (1.778 m) 213 lb 12.8 oz (97 kg) SpO2 BMI Smoking Status 95% 30.68 kg/m2 Never Smoker Vitals History BMI and BSA Data Body Mass Index Body Surface Area  
 30.68 kg/m 2 2.19 m 2 Preferred Pharmacy Pharmacy Name Phone [address] Shahnaz Roach [address] Your Updated Medication List  
  
   
This list is accurate as of 18  4:49 PM.  Always use your most recent med list.  
  
  
  
  
 losartan 50 mg tablet Commonly known as:  COZAAR Take 1 Tab by mouth daily. Indications: hypertension  
  
 predniSONE 10 mg dose pack Commonly known as:  STERAPRED DS  
12 day DS taper pack as directed Prescriptions Sent to Pharmacy Refills  
 predniSONE (STERAPRED DS) 10 mg dose pack 0 Si day DS taper pack as directed Class: Normal  
 Pharmacy: [facility] [address] LINDSAY OLIVIA [address] [place] [address] #: [phone] Follow-up Instructions Return if symptoms worsen or fail to improve. [facility]! Dear Isaiah Sylvester: 
Thank you for requesting a [employer] account. Our records indicate that you already have an active [employer] account. You can access your account anytime at [email or web address] Did you know that you can access your hospital and ER discharge instructions at any time in [employer]? You can also review all of your test results from your hospital stay or ER visit. Additional Information If you have questions, please visit the Frequently Asked Questions section of the [employer] website at [email or web address] Remember, [employer] is NOT to be used for urgent needs. For medical emergencies, dial 911. Now available from your iPhone and Android! Please provide this summary of care documentation to your next provider. Your primary care clinician is listed as Phys Other. If you have any questions after today's visit, please call [phone].

## 2020-04-15 ENCOUNTER — TELEPHONE (OUTPATIENT)
Dept: ENDOCRINOLOGY | Age: 70
End: 2020-04-15

## 2020-04-15 DIAGNOSIS — E11.65 TYPE 2 DIABETES MELLITUS WITH HYPERGLYCEMIA, WITHOUT LONG-TERM CURRENT USE OF INSULIN (HCC): ICD-10-CM

## 2020-04-15 RX ORDER — LANCETS 33 GAUGE
EACH MISCELLANEOUS
Qty: 100 LANCET | Refills: 3 | Status: SHIPPED | OUTPATIENT
Start: 2020-04-15 | End: 2021-06-11 | Stop reason: SDUPTHER

## 2020-04-20 ENCOUNTER — TELEPHONE (OUTPATIENT)
Dept: ENDOCRINOLOGY | Age: 70
End: 2020-04-20

## 2020-04-20 DIAGNOSIS — I10 ESSENTIAL HYPERTENSION: ICD-10-CM

## 2020-04-20 DIAGNOSIS — E11.65 TYPE 2 DIABETES MELLITUS WITH HYPERGLYCEMIA, WITHOUT LONG-TERM CURRENT USE OF INSULIN (HCC): Primary | ICD-10-CM

## 2020-04-20 NOTE — TELEPHONE ENCOUNTER
Order placed for pt per verbal order with read back from Dr. Veronica Pantoja 04/20/20    Lab slips mailed

## 2020-06-12 NOTE — PROGRESS NOTES
[address] [facility]. [place], [facility]  Tel.  [phone]  Fax. 100 [facility] 60  [place], [address] [facility]  Tel.  [phone]  Fax. [phone] [address]  Tel.  [phone]  Fax. [phone]       S>Bryan Duarte is a 74 y.o. male seen today to receive a home sleep testing unit (HST). · Patient was educated on proper hookup and operation of the HST. · Instruction forms and documentation were reviewed and signed. · The patient demonstrated good understanding of the HST. O>    Visit Vitals    /84    Pulse 65    Temp 98.4 °F (36.9 °C)    Ht 5' 10\" (1.778 m)    Wt 208 lb (94.3 kg)    SpO2 93%    BMI 29.84 kg/m2         A>  1. Obstructive sleep apnea (adult) (pediatric)          P>  · General information regarding operations and maintenance of the device was provided. · He was provided information on sleep apnea including coresponding risk factors and the importance of proper treatment. · Follow-up appointment was made to return the HST. He will be contacted once the results have been reviewed. · He was asked to contact our office for any problems regarding his home sleep test study. Detail Level: Detailed Detail Level: Generalized

## 2020-07-29 ENCOUNTER — OFFICE VISIT (OUTPATIENT)
Dept: SLEEP MEDICINE | Age: 70
End: 2020-07-29

## 2020-07-29 DIAGNOSIS — G47.33 OSA (OBSTRUCTIVE SLEEP APNEA): Primary | ICD-10-CM

## 2020-07-29 NOTE — PROGRESS NOTES
217 [facility] [place]. [place], [address]  Tel.  [phone]  Fax. 100 [facility] 60  [place], [address] [facility]  Tel.  [phone]  Fax. [phone] [address] [facility] [address]   Tel.  [phone]  Fax. [phone]     Haily Forman is a 65 y.o. male who was seen by synchronous (real-time) audio-video technology on 7/29/2020. Consent:  He and/or his healthcare decision maker is aware that this patient-initiated Telehealth encounter is a billable service, with coverage as determined by his insurance carrier. He is aware that he may receive a bill and has provided verbal consent to proceed: Yes    I was in the office while conducting this encounter. Chief Complaint       No chief complaint on file. HPI        Haily Forman is a 65 y.o. male seen for follow-up. He was evaluated with a sleep study demonstrating mild sleep disordered breathing characterized by an AHI of 6/h. Has been started on APAP 5 15 cm. Compliance data downloaded and reviewed in detail with the patient today. During the past 30 days, APAP used during 30 days with the average daily use of 11.35 hours. CMS compliance criteria 97%. AHI 3.6 per hour. Mask leak elevated. Allergies   Allergen Reactions    Erythromycin Nausea and Vomiting       Current Outpatient Medications   Medication Sig Dispense Refill    lancets (One Touch Delica) 33 gauge misc Use to check BG once daily. Dx code E11.65 100 Lancet 3    glucose blood VI test strips (BLOOD GLUCOSE TEST) strip Use to check BG once daily. E11.65 50 Strip 11    losartan (COZAAR) 50 mg tablet TAKE ONE TABLET BY MOUTH ONCE DAILY FOR  HYPERTENSION 90 Tab 3    Blood-Glucose Meter monitoring kit Use to check BG once daily. E11.65 1 Kit 0        He  has a past medical history of Diabetes ([address] Headache, Hypertension, Sleep apnea, and Subclinical hypothyroidism (5/30/2019).  He also has no past medical history of Anemia, Arrhythmia, Arthritis, Asthma, Autoimmune disease ([address] CAD (coronary artery disease), Calculus of kidney, Cancer ([address] Chronic kidney disease, Chronic obstructive pulmonary disease ([address] Chronic pain, Congestive heart failure ([address] Contact dermatitis and other eczema, due to unspecified cause, Depression, GERD (gastroesophageal reflux disease), Hypercholesterolemia, Liver disease, Malignant hyperthermia due to anesthesia, Psychotic disorder ([address] PUD (peptic ulcer disease), Seizures ([address] Stroke ([address] Thromboembolus ([address] or Trauma. He  has a past surgical history that includes hx hernia repair (Bilateral). He family history includes Cancer in his mother; Heart Disease in his father. He  reports that he has never smoked. He has never used smokeless tobacco. He reports current alcohol use. He reports that he does not use drugs. Review of Systems:  Unchanged per patient    Due to this being a telemedicine evaluation, certain elements of the physical examination are unable to be assessed. Objective:      General:   Conversant, cooperative   Eyes:  no nystagmus                            Neuro:  Speech fluent, face symmetrical             Assessment:       ICD-10-CM ICD-9-CM    1. JOVANY (obstructive sleep apnea)  G47.33 327.23 AMB SUPPLY ORDER     Sleep disordered breathing responding to APAP. He will continue with the current pressure settings. He will contact the office for specific problems. Follow-up appointment to be scheduled. he is compliant with PAP therapy and PAP continues to benefit patient and remains necessary for control of his sleep apnea. Plan:     Orders Placed This Encounter    AMB SUPPLY ORDER     Diagnosis: Obstructive Sleep Apnea ICD-10 Code (G47.33)    CPAP mask and supplies -  Patient preference, headgear, heated tubing, and filter;  heated humidifier.  Oral/Nasal Combo Mask 1 every 3 months.    Oral Cushion Combo Mask (Replace) 2 per month.   Nasal Pillows Combo Mask (Replace) 2 per month.  Full Face Mask 1 every 3 months.  Full Face Mask Cushion 1 per month.  Nasal Cushion (Replace) 2 per month.  Nasal Pillows (Replace) 2 per month.  Nasal Interface Mask 1 every 3 months.  Headgear 1 every 6 months.  Chinstrap 1 every 6 months.  Tubing 1 every 3 months.  Filter(s) Disposable 2 per month.  Filter(s) Non-Disposable 1 every 6 months.  Oral Interface 1 every 3 months. [address] for [employer] (Replace) 1 every 6 months.  Tubing with heating element 1 every 3 months.                 Itzel Sandoval MD, [facility]  Diplomate, American Board of Sleep Medicine  NPI [id number]  Electronically signed 7/29/20       * Patient has a history and examination consistent with the diagnosis of sleep apnea. * He was provided information on sleep apnea including corresponding risk factors and the importance of proper treatment. * Treatment options if indicated were reviewed today. Itzel Sandoval MD, [facility]  Electronically signed 07/29/20    Pursuant to the emergency declaration under the [facility] waiver authority and the [employer] Act, this Virtual  Visit was conducted, with patient's consent, to reduce the patient's risk of exposure to COVID-19 and provide continuity of care for an established patient. Services were provided through a video synchronous discussion virtually to substitute for in-person clinic visit. Mine Mejia MD       This note was created using voice recognition software. Despite editing, there may be syntax errors. This note will not be viewable in [address]

## 2020-07-30 ENCOUNTER — DOCUMENTATION ONLY (OUTPATIENT)
Dept: SLEEP MEDICINE | Age: 70
End: 2020-07-30

## 2020-10-10 DIAGNOSIS — I10 ESSENTIAL HYPERTENSION: ICD-10-CM

## 2020-10-10 RX ORDER — LOSARTAN POTASSIUM 50 MG/1
TABLET ORAL
Qty: 90 TAB | Refills: 0 | Status: SHIPPED | OUTPATIENT
Start: 2020-10-10 | End: 2021-10-04 | Stop reason: SDUPTHER

## 2021-05-27 ENCOUNTER — TELEPHONE (OUTPATIENT)
Dept: FAMILY MEDICINE CLINIC | Age: 71
End: 2021-05-27

## 2021-05-27 NOTE — TELEPHONE ENCOUNTER
Pt called asking for appt for severe fatigue and cpe. PSR saw that pt has not seen office/Ani since early 2018. PSR told him he will be considered a new pt again and that [facility] is no longer taking new pts but could get him in with another provider. Pt was okay with that. PSR saw that pt was having dm managed by [facility] and had not seen them since 1.2020 and no dm labs since then. PSR was concerned about pt's severe fatigue and dm status so PSR spoke with Practice Manager about what to do about scheduling pt. We agreed on getting pt in with [facility] d/t recent provider in office who saw him and soonest appt available. PSR offered pt 6.1 at 8am for a fit in. Pt asked for a sooner appt. With the holiday, there are no sooner appts. He stated he will make an appt with [employer] for a sooner appt. He asked what was the soonest for other [facility] providers. PSR told him that PSR can only schedule for this office. Pt then asked for the [facility] number. PSR told him that every office has their own different number. He stated thank you and still refused the fitted in appt.

## 2021-06-11 DIAGNOSIS — E11.65 TYPE 2 DIABETES MELLITUS WITH HYPERGLYCEMIA, WITHOUT LONG-TERM CURRENT USE OF INSULIN (HCC): ICD-10-CM

## 2021-06-11 RX ORDER — INSULIN PUMP SYRINGE, 3 ML
EACH MISCELLANEOUS
Qty: 1 KIT | Refills: 0 | Status: SHIPPED | OUTPATIENT
Start: 2021-06-11

## 2021-06-11 RX ORDER — LANCETS 33 GAUGE
EACH MISCELLANEOUS
Qty: 100 LANCET | Refills: 3 | Status: SHIPPED | OUTPATIENT
Start: 2021-06-11

## 2021-06-11 NOTE — TELEPHONE ENCOUNTER
Patient called did not have recent apt or one scheduled,  had him schedule apt wanting new meter and lancets and strips one touch stating same as he had before.

## 2021-06-28 ENCOUNTER — OFFICE VISIT (OUTPATIENT)
Dept: ENDOCRINOLOGY | Age: 71
End: 2021-06-28
Payer: MEDICARE

## 2021-06-28 VITALS
WEIGHT: 206 LBS | BODY MASS INDEX: 29.49 KG/M2 | HEIGHT: 70 IN | OXYGEN SATURATION: 95 % | SYSTOLIC BLOOD PRESSURE: 144 MMHG | TEMPERATURE: 96.8 F | DIASTOLIC BLOOD PRESSURE: 80 MMHG

## 2021-06-28 DIAGNOSIS — E11.65 TYPE 2 DIABETES MELLITUS WITH HYPERGLYCEMIA, UNSPECIFIED WHETHER LONG TERM INSULIN USE (HCC): Primary | ICD-10-CM

## 2021-06-28 DIAGNOSIS — I10 ESSENTIAL HYPERTENSION: ICD-10-CM

## 2021-06-28 LAB
ANION GAP SERPL CALC-SCNC: 4 MMOL/L (ref 5–15)
BUN SERPL-MCNC: 44 MG/DL (ref 6–20)
BUN/CREAT SERPL: 42 (ref 12–20)
CALCIUM SERPL-MCNC: 9.9 MG/DL (ref 8.5–10.1)
CHLORIDE SERPL-SCNC: 108 MMOL/L (ref 97–108)
CO2 SERPL-SCNC: 25 MMOL/L (ref 21–32)
CREAT SERPL-MCNC: 1.04 MG/DL (ref 0.7–1.3)
GLUCOSE SERPL-MCNC: 136 MG/DL (ref 65–100)
LDLC SERPL DIRECT ASSAY-MCNC: 75 MG/DL (ref 0–100)
POTASSIUM SERPL-SCNC: 4.8 MMOL/L (ref 3.5–5.1)
SODIUM SERPL-SCNC: 137 MMOL/L (ref 136–145)

## 2021-06-28 PROCEDURE — G8510 SCR DEP NEG, NO PLAN REQD: HCPCS | Performed by: INTERNAL MEDICINE

## 2021-06-28 PROCEDURE — G8754 DIAS BP LESS 90: HCPCS | Performed by: INTERNAL MEDICINE

## 2021-06-28 PROCEDURE — 99214 OFFICE O/P EST MOD 30 MIN: CPT | Performed by: INTERNAL MEDICINE

## 2021-06-28 PROCEDURE — 1101F PT FALLS ASSESS-DOCD LE1/YR: CPT | Performed by: INTERNAL MEDICINE

## 2021-06-28 PROCEDURE — G8427 DOCREV CUR MEDS BY ELIG CLIN: HCPCS | Performed by: INTERNAL MEDICINE

## 2021-06-28 PROCEDURE — 3017F COLORECTAL CA SCREEN DOC REV: CPT | Performed by: INTERNAL MEDICINE

## 2021-06-28 PROCEDURE — G8536 NO DOC ELDER MAL SCRN: HCPCS | Performed by: INTERNAL MEDICINE

## 2021-06-28 PROCEDURE — G8419 CALC BMI OUT NRM PARAM NOF/U: HCPCS | Performed by: INTERNAL MEDICINE

## 2021-06-28 PROCEDURE — 2022F DILAT RTA XM EVC RTNOPTHY: CPT | Performed by: INTERNAL MEDICINE

## 2021-06-28 PROCEDURE — 3046F HEMOGLOBIN A1C LEVEL >9.0%: CPT | Performed by: INTERNAL MEDICINE

## 2021-06-28 PROCEDURE — G8753 SYS BP > OR = 140: HCPCS | Performed by: INTERNAL MEDICINE

## 2021-06-28 RX ORDER — PIOGLITAZONEHYDROCHLORIDE 15 MG/1
15 TABLET ORAL DAILY
Qty: 90 TABLET | Refills: 3 | Status: SHIPPED | OUTPATIENT
Start: 2021-06-28 | End: 2021-09-17 | Stop reason: SDUPTHER

## 2021-06-28 RX ORDER — METFORMIN HYDROCHLORIDE 500 MG/1
500 TABLET ORAL 2 TIMES DAILY WITH MEALS
COMMUNITY
End: 2021-09-08

## 2021-06-28 NOTE — PROGRESS NOTES
Vinny Bowling MD          Patient Information  Date:6/28/2021  Name : Heaven Kenney 65 y.o.     YOB: 1950             Chief Complaint   Patient presents with    Diabetes       History of Present Illness: Heaven Kenney is a 65 y.o. male here for fu of  Type 2 Diabetes Mellitus. Type 2 Diabetes was diagnosed several years ago  . He was managed by endocrinologist, Dr. Mely Lewis in [place] in the past  He was seen more than a year ago and. He does not believe in any medications, \"metformin and other medications for diabetes are very bad\" and had self discontinued. In May 2021[facility], A1c more than 11, has polyuria, polydipsia  Recently started back on Metformin  Diet is off       no chest pain or shortness of breath, severe headaches. Wt Readings from Last 3 Encounters:   06/28/21 206 lb (93.4 kg)   01/29/20 233 lb (105.7 kg)   09/19/19 222 lb (100.7 kg)       BP Readings from Last 3 Encounters:   06/28/21 (!) 144/80   01/29/20 179/82   09/19/19 117/61           Past Medical History:   Diagnosis Date    Diabetes ([facility]     Type 2, diet controlled    Headache     Hypertension     Sleep apnea     cpap    Subclinical hypothyroidism 5/30/2019     Current Outpatient Medications   Medication Sig    metFORMIN (GLUCOPHAGE) 500 mg tablet Take 100 mg by mouth two (2) times daily (with meals).  glucose blood VI test strips (OneTouch Ultra Blue Test Strip) strip Use to check BG once daily. Dx code E11.65    lancets (One Touch Delica) 33 gauge misc Use to check BG once daily. Dx code E11.65    Blood-Glucose Meter monitoring kit Use to check BG once daily. E11.65    losartan (COZAAR) 50 mg tablet TAKE 1 TABLET BY MOUTH ONCE DAILY FOR  HYPERTENSION    pioglitazone (ACTOS) 15 mg tablet Take 1 Tablet by mouth daily. No current facility-administered medications for this visit.      Allergies   Allergen Reactions  Erythromycin Nausea and Vomiting         Review of Systems:  -   - Per HPI    Physical Examination:   Blood pressure (!) 144/80, temperature 96.8 °F (36 °C), height 5' 10\" (1.778 m), weight 206 lb (93.4 kg), SpO2 95 %. Estimated body mass index is 29.56 kg/m² as calculated from the following:    Height as of this encounter: 5' 10\" (1.778 m). -   Weight as of this encounter: 206 lb (93.4 kg). - General: pleasant, no distress, good eye contact  - HEENT: no pallor, no periorbital edema, EOMI  - Neck: supple,  - Cardiovascular: regular, normal rate, normal S1 and S2,   - Respiratory: clear to auscultation bilaterally  -   - Neurological: alert and oriented  - Psychiatric: normal mood and affect  - Skin: color, texture, turgor normal.               Data Reviewed:   Lab Results   Component Value Date/Time    Hemoglobin A1c 6.8 (H) 09/12/2019 09:44 AM    Hemoglobin A1c 7.2 (H) 06/01/2018 11:30 AM    Hemoglobin A1c 7.2 (H) 02/17/2017 03:42 PM    Hemoglobin A1c, External 9.2 01/19/2016 12:00 AM    Glucose 108 (H) 09/12/2019 09:44 AM    Glucose (POC) 117 (H) 12/14/2016 04:38 PM    Glucose  01/29/2020 11:18 AM    Microalb/Creat ratio (ug/mg creat.) 23.8 02/25/2019 11:26 AM    LDL, calculated 84 06/01/2018 11:30 AM    Creatinine 1.03 09/12/2019 09:44 AM      Lab Results   Component Value Date/Time    GFR est non-AA 73 09/12/2019 09:44 AM    GFR est AA 84 09/12/2019 09:44 AM    Creatinine 1.03 09/12/2019 09:44 AM    BUN 23 09/12/2019 09:44 AM    Sodium 139 09/12/2019 09:44 AM    Potassium 4.5 09/12/2019 09:44 AM    Chloride 103 09/12/2019 09:44 AM    CO2 23 09/12/2019 09:44 AM    Magnesium 2.1 07/15/2017 07:24 PM    Phosphorus 4.9 (H) 07/15/2017 07:24 PM            Cr nl 8/16    Assessment/Plan:     1. Type 2 diabetes mellitus with hyperglycemia, unspecified whether long term insulin use ([facility]    2. Essential hypertension        1.  Type 2 Diabetes Mellitus   Lab Results   Component Value Date/Time    Hemoglobin A1c 6.8 (H) 09/12/2019 09:44 AM    Hemoglobin A1c (POC) 8.4 01/29/2020 11:21 AM    Hemoglobin A1c, External 9.2 01/19/2016 12:00 AM   [facility] as [facility] as the [facility]  He does not believe in medications, wants to try to control it with lifestyle changes  [facility], A1c more than 11  Poorly controlled diabetes mellitus, discussed the complications  Metformin, Actos    2. HTN :  Declined medications  Compliance with the medications discussed, low-salt diet  Risks of untreated hypertension discussed  Follow-up with PCP    3. Declined statin    4. Obesity:Body mass index is 29.56 kg/m². Discussed about the importance of exercise and carbohydrate portion control. 5.  JOVANY - CPAP     6. History of subclinical hypothyroidism- no therapy indicated    There are no Patient Instructions on file for this visit. Follow-up and Dispositions    · Return in about 6 weeks (around 8/9/2021). Thank you for allowing me to participate in the care of this patient.     Scooby Gillespie MD      Patient verbalized understanding

## 2021-06-28 NOTE — LETTER
6/30/2021    Patient: Vanessa Coto   YOB: 1950   Date of Visit: 6/28/2021     Chepe Wilson, [address] [facility] [address] [address]  [place] [employer]    Dear Chepe Wilson MD,      Thank you for referring Mr. Bryan Duarte to [id number] [place] [address] for evaluation. My notes for this consultation are attached. If you have questions, please do not hesitate to call me. I look forward to following your patient along with you.       Sincerely,    Makenna Augustin MD

## 2021-07-12 ENCOUNTER — TRANSCRIBE ORDER (OUTPATIENT)
Dept: INTERNAL MEDICINE CLINIC | Age: 71
End: 2021-07-12

## 2021-07-12 ENCOUNTER — TELEPHONE (OUTPATIENT)
Dept: ENDOCRINOLOGY | Age: 71
End: 2021-07-12

## 2021-07-12 NOTE — TELEPHONE ENCOUNTER
----- Message from Kiersten Carreon sent at 7/12/2021  4:06 PM EDT -----  Regarding:  Tanisha Polk MD  Medication Refill    Caller (if not patient): Magdalena Duarte-Pt's Brother     Relationship of caller (if not patient):      Best contact number(s): [phone]      Name of medication and dosage if known: metFORMIN (GLUCOPHAGE) 500 mg tablet       Is patient out of this medication (yes/no):  Yes      Pharmacy name: [address]    Pharmacy listed in chart? (yes/no):  Pharmacy phone number:      Details to clarify the request: caller would like to confirm if pt should continue taking metFORMIN (GLUCOPHAGE) 500 mg tablet while taking pioglitazone (ACTOS) 15 mg tablet      Kiersten Carreon

## 2021-07-13 ENCOUNTER — DOCUMENTATION ONLY (OUTPATIENT)
Dept: SLEEP MEDICINE | Age: 71
End: 2021-07-13

## 2021-07-13 NOTE — TELEPHONE ENCOUNTER
----- Message from Sergio Falcon sent at 7/13/2021  9:34 AM EDT -----  Regarding: Dr Camara [place]  Patient return call    Caller's first and last name and relationship (if not the patient):Wilfrid/brother      Best contact number(s):[phone]      Whose call is being returned:Nikole      Details to clarify the request:      Sergio Falcon

## 2021-07-13 NOTE — TELEPHONE ENCOUNTER
----- Message from Rosey Alarcon sent at 7/13/2021 10:11 AM EDT -----  Regarding: Dr. Nazanin Thornton first and last name:Wilfrid Duarte(brother)      Reason for call: advised nurse pt picked up medication      Callback required yes/no and why:yes      Best contact number(s):[phone]      Details to clarify the request:Pt's brother stated pt already picked up his medication and situation has been resolved, hopefully.       Rosey Alarcon

## 2021-07-13 NOTE — TELEPHONE ENCOUNTER
Attempted to call. Unsuccessful. Left msg for Pascual Shearing to give us a call back at the office. A callback number was left.

## 2021-07-13 NOTE — TELEPHONE ENCOUNTER
Informed pt's brother Rafaela Carroll, on HIPAA that per the last note pt is to take Metformin and Actos. He stated the pt has had difficulty getting the Metformin. Asked him which pharmacy he would like the rx to go to and we'll send a new one. He will check with his brother and call back.

## 2021-07-14 ENCOUNTER — OFFICE VISIT (OUTPATIENT)
Dept: FAMILY MEDICINE CLINIC | Age: 71
End: 2021-07-14
Payer: MEDICARE

## 2021-07-14 VITALS
HEART RATE: 99 BPM | DIASTOLIC BLOOD PRESSURE: 79 MMHG | TEMPERATURE: 98.2 F | WEIGHT: 203 LBS | RESPIRATION RATE: 18 BRPM | SYSTOLIC BLOOD PRESSURE: 113 MMHG | BODY MASS INDEX: 28.42 KG/M2 | HEIGHT: 71 IN | OXYGEN SATURATION: 95 %

## 2021-07-14 DIAGNOSIS — Z23 ENCOUNTER FOR IMMUNIZATION: ICD-10-CM

## 2021-07-14 DIAGNOSIS — I10 ESSENTIAL HYPERTENSION: ICD-10-CM

## 2021-07-14 DIAGNOSIS — E03.8 SUBCLINICAL HYPOTHYROIDISM: ICD-10-CM

## 2021-07-14 DIAGNOSIS — Z00.00 MEDICARE ANNUAL WELLNESS VISIT, SUBSEQUENT: Primary | ICD-10-CM

## 2021-07-14 DIAGNOSIS — E11.65 TYPE 2 DIABETES MELLITUS WITH HYPERGLYCEMIA, WITHOUT LONG-TERM CURRENT USE OF INSULIN (HCC): ICD-10-CM

## 2021-07-14 DIAGNOSIS — G47.33 OSA ON CPAP: ICD-10-CM

## 2021-07-14 DIAGNOSIS — Z99.89 OSA ON CPAP: ICD-10-CM

## 2021-07-14 PROCEDURE — 1101F PT FALLS ASSESS-DOCD LE1/YR: CPT | Performed by: NURSE PRACTITIONER

## 2021-07-14 PROCEDURE — G8427 DOCREV CUR MEDS BY ELIG CLIN: HCPCS | Performed by: NURSE PRACTITIONER

## 2021-07-14 PROCEDURE — G8754 DIAS BP LESS 90: HCPCS | Performed by: NURSE PRACTITIONER

## 2021-07-14 PROCEDURE — 3017F COLORECTAL CA SCREEN DOC REV: CPT | Performed by: NURSE PRACTITIONER

## 2021-07-14 PROCEDURE — 2022F DILAT RTA XM EVC RTNOPTHY: CPT | Performed by: NURSE PRACTITIONER

## 2021-07-14 PROCEDURE — G8536 NO DOC ELDER MAL SCRN: HCPCS | Performed by: NURSE PRACTITIONER

## 2021-07-14 PROCEDURE — G0439 PPPS, SUBSEQ VISIT: HCPCS | Performed by: NURSE PRACTITIONER

## 2021-07-14 PROCEDURE — G8432 DEP SCR NOT DOC, RNG: HCPCS | Performed by: NURSE PRACTITIONER

## 2021-07-14 PROCEDURE — G8419 CALC BMI OUT NRM PARAM NOF/U: HCPCS | Performed by: NURSE PRACTITIONER

## 2021-07-14 PROCEDURE — G0463 HOSPITAL OUTPT CLINIC VISIT: HCPCS | Performed by: NURSE PRACTITIONER

## 2021-07-14 PROCEDURE — 3046F HEMOGLOBIN A1C LEVEL >9.0%: CPT | Performed by: NURSE PRACTITIONER

## 2021-07-14 PROCEDURE — G8752 SYS BP LESS 140: HCPCS | Performed by: NURSE PRACTITIONER

## 2021-07-14 PROCEDURE — 99202 OFFICE O/P NEW SF 15 MIN: CPT | Performed by: NURSE PRACTITIONER

## 2021-07-14 RX ORDER — ZOSTER VACCINE RECOMBINANT, ADJUVANTED 50 MCG/0.5
0.5 KIT INTRAMUSCULAR ONCE
Qty: 0.5 ML | Refills: 1 | Status: SHIPPED | OUTPATIENT
Start: 2021-07-14 | End: 2021-07-14

## 2021-07-14 NOTE — PROGRESS NOTES
Chief Complaint   Patient presents with   [facility].     Patient in office today to re-est care. Last seen by Dr. Armando Smalls in 2018. Pt have c/o of multiple concerns:    Pt states cpap machine has been recalled. Pt states sleep specalist is Buford Stockton of name. Pt states sugars have been elevated. BS ranges:130's-200's. Also needs a new meter. Pt is poor historian. Reviewed his previous records. Saw Dr. Mis Berg in June. Was seen at urgent care prior and a1c was over 11. Was not on meds at that time. She recommended starting insulin therapy but pt does not believe in medications and wanted to work on diet and lifestyle. Looks like he was willing to start the metformin and actos. Reports compliance with medications. Reports that BG was 186 last time he checked, then down to 130, 131. EMR shows an appt with Dr. Luana Escobar on 7/28. Thinks this is contributing to his excessive daytime sleepiness. Has been getting the run around from CPAP supply company. Planning on switching to [address]. Health Maintenance Due   Topic Date Due    COVID-19 Vaccine (1) Never done    Shingrix Vaccine Age 50> (1 of 2) Never done    Pneumococcal 65+ years (1 of 1 - PPSV23) Never done    Medicare Yearly Exam  07/26/2018    Lipid Screen  06/01/2019    MICROALBUMIN Q1  02/25/2020    Foot Exam Q1  09/20/2020    A1C test (Diabetic or Prediabetic)  01/29/2021    Eye Exam Retinal or Dilated  02/20/2021     Pt had a colonoscopy about 7 years ago and normal. This was done in [place]. Pt would like to complete shingles vaccine. Last eye exam was over a year ago. Cannot recall the name. Declined pneumonia vaccine. Declined updating any additional labs today. This is the Subsequent Medicare Annual Wellness Exam, performed 12 months or more after the Initial AWV or the last Subsequent AWV    I have reviewed the patient's medical history in detail and updated the computerized patient record. Assessment/Plan   Education and counseling provided:  Are appropriate based on today's review and evaluation    1. Medicare annual wellness visit, subsequent    2. Type 2 diabetes mellitus with hyperglycemia, without long-term current use of insulin ([facility])  Reports compliance with med regimen. Checking BG and sugars are trending down. Also working on diet. Enc pt to keep up the good work. Upcoming follow up visit with Dr. Edmond Lay scheduled. Enc pt to schedule an eye exam.   3. Subclinical hypothyroidism  Being followed by Dr. Edmond Lay. 4. Essential hypertension  BP looks great. Continue losartan daily as prescribed. 5. JOVANY on CPAP  Enc pt to keep his upcoming appt with Dr. Tsering Carrillo. 6. Encounter for immunization  -     varicella-zoster recombinant, PF, (Shingrix, PF,) 50 mcg/0.5 mL susr injection; 0.5 mL by IntraMUSCular route once for 1 dose., Normal, Disp-0.5 mL, R-1  Recommended pt complete shingrix series at local pharmacy. Depression Risk Factor Screening     3 most recent PHQ Screens 7/14/2021   Little interest or pleasure in doing things Not at all   Feeling down, depressed, irritable, or hopeless Not at all   Total Score PHQ 2 0       Alcohol Risk Screen    Do you average more than 1 drink per night or more than 7 drinks a week: No    In the past three months have you have had more than 4 drinks containing alcohol on one occasion: No        Functional Ability and Level of Safety    Hearing: Hearing is good. Activities of Daily Living: The home contains: no safety equipment. Pt lives alone. Brother checks in on him, lives in [place]. Patient does total self care   Pt drives. Ambulation: with no difficulty     Fall Risk:  Fall Risk Assessment, last 12 mths 7/14/2021   Able to walk? Yes   Fall in past 12 months? 0   Do you feel unsteady?  0   Are you worried about falling 0      Abuse Screen:  Patient is not abused       Cognitive Screening    Has your family/caregiver stated any concerns about your memory: no     Cognitive Screening: Normal - MMSE (Mini Mental Status Exam)     Objective:     Vitals:    07/14/21 1409   BP: 113/79   Pulse: 99   Resp: 18   Temp: 98.2 °F (36.8 °C)   TempSrc: Oral   SpO2: 95%   Weight: 203 lb (92.1 kg)   Height: 5' 10.5\" (1.791 m)     General appearance - alert, well appearing, and in no distress  Mental status - alert, oriented to person, place, and time, normal mood, behavior, speech, dress, motor activity, and thought processes  Eyes - pupils equal and reactive, extraocular eye movements intact  Ears - bilateral TM's and external ear canals normal  Nose - normal and patent, no erythema, discharge or polyps and normal nontender sinuses  Mouth - mucous membranes moist, pharynx normal without lesions  Neck - supple, no significant adenopathy, carotids upstroke normal bilaterally, no bruits, thyroid exam: thyroid is normal in size without nodules or tenderness  Chest - clear to auscultation, no wheezes, rales or rhonchi, symmetric air entry  Heart - normal rate, regular rhythm, normal S1, S2, no murmurs  Extremities - peripheral pulses normal, no ankle edema, no clubbing or cyanosis  Skin - normal coloration and turgor, no rashes, no suspicious skin lesions noted      Health Maintenance Due     Health Maintenance Due   Topic Date Due    COVID-19 Vaccine (1) Never done    Shingrix Vaccine Age 50> (1 of 2) Never done    Pneumococcal 65+ years (1 of 1 - PPSV23) Never done    Lipid Screen  06/01/2019    MICROALBUMIN Q1  02/25/2020    Foot Exam Q1  09/20/2020    A1C test (Diabetic or Prediabetic)  01/29/2021    Eye Exam Retinal or Dilated  02/20/2021       Patient Care Team   Patient Care Team:  Liliana Worrell MD as PCP - General (Family Medicine)  Bri Bryson MD as Surgeon (General Surgery)  Hines Litten, MD (Endocrinology)  Jean Sotomayor MD as Physician (Sleep Medicine)  Barrington Ta MD as Physician (Sleep Medicine)    History     Patient Active Problem List   Diagnosis Code    Type 2 diabetes mellitus with hyperglycemia, without long-term current use of insulin (HCC) E11.65    Essential hypertension I10    Acute renal failure (HCC) N17.9    Advance care planning Z71.89    Subclinical hypothyroidism E03.9     Past Medical History:   Diagnosis Date    Diabetes ([facility]     Type 2, diet controlled    Headache     Hypertension     Sleep apnea     cpap    Subclinical hypothyroidism 5/30/2019      Past Surgical History:   Procedure Laterality Date    HX HERNIA REPAIR Bilateral      Current Outpatient Medications   Medication Sig Dispense Refill    metFORMIN (GLUCOPHAGE) 500 mg tablet Take 100 mg by mouth two (2) times daily (with meals).  pioglitazone (ACTOS) 15 mg tablet Take 1 Tablet by mouth daily. 90 Tablet 3    glucose blood VI test strips (OneTouch Ultra Blue Test Strip) strip Use to check BG once daily. Dx code E11.65 100 Strip 3    lancets (One Touch Delica) 33 gauge misc Use to check BG once daily. Dx code E11.65 100 Lancet 3    Blood-Glucose Meter monitoring kit Use to check BG once daily.  E11.65 1 Kit 0    losartan (COZAAR) 50 mg tablet TAKE 1 TABLET BY MOUTH ONCE DAILY FOR  HYPERTENSION 90 Tab 0     Allergies   Allergen Reactions    Erythromycin Nausea and Vomiting       Family History   Problem Relation Age of Onset    Cancer Mother         melanoma    Heart Disease Father      Social History     Tobacco Use    Smoking status: Never Smoker    Smokeless tobacco: Never Used   Substance Use Topics    Alcohol use: Yes     Comment: 1-2 drinks every 3 months         Peyton Mckeon NP

## 2021-07-14 NOTE — PATIENT INSTRUCTIONS
Medicare Wellness Visit, Male    The best way to live healthy is to have a lifestyle where you eat a well-balanced diet, exercise regularly, limit alcohol use, and quit all forms of tobacco/nicotine, if applicable. Regular preventive services are another way to keep healthy. Preventive services (vaccines, screening tests, monitoring & exams) can help personalize your care plan, which helps you manage your own care. Screening tests can find health problems at the earliest stages, when they are easiest to treat. [address] follows the current, evidence-based guidelines published by the [facility] Naid[facility] ([facility]) when recommending preventive services for our patients. Because we follow these guidelines, sometimes recommendations change over time as research supports it. (For example, a prostate screening blood test is no longer routinely recommended for men with no symptoms). Of course, you and your doctor may decide to screen more often for some diseases, based on your risk and co-morbidities (chronic disease you are already diagnosed with). Preventive services for you include:  - Medicare offers their members a free annual wellness visit, which is time for you and your primary care provider to discuss and plan for your preventive service needs. Take advantage of this benefit every year!  -All adults over age 72 should receive the recommended pneumonia vaccines. Current USPSTF guidelines recommend a series of two vaccines for the best pneumonia protection.   -All adults should have a flu vaccine yearly and tetanus vaccine every 10 years.  -All adults age 48 and older should receive the shingles vaccines (series of two vaccines).        -All adults age 38-68 who are overweight should have a diabetes screening test once every three years.   -Other screening tests & preventive services for persons with diabetes include: an eye exam to screen for diabetic retinopathy, a kidney function test, a foot exam, and stricter control over your cholesterol.   -Cardiovascular screening for adults with routine risk involves an electrocardiogram (ECG) at intervals determined by the provider.   -Colorectal cancer screening should be done for adults age 54-65 with no increased risk factors for colorectal cancer. There are a number of acceptable methods of screening for this type of cancer. Each test has its own benefits and drawbacks. Discuss with your provider what is most appropriate for you during your annual wellness visit. The different tests include: colonoscopy (considered the best screening method), a fecal occult blood test, a fecal DNA test, and sigmoidoscopy.  -All adults born between [facility] should be screened once for Hepatitis C.  -An Abdominal Aortic Aneurysm (AAA) Screening is recommended for men age 73-68 who has ever smoked in their lifetime. Here is a list of your current Health Maintenance items (your personalized list of preventive services) with a due date:  Health Maintenance Due   Topic Date Due    COVID-19 Vaccine (1) Never done    Shingles Vaccine (1 of 2) Never done    Pneumococcal Vaccine (1 of 1 - PPSV23) Never done    Cholesterol Test   06/01/2019    Albumin Urine Test  02/25/2020    Diabetic Foot Care  09/20/2020    Hemoglobin A1C    01/29/2021    Eye Exam  02/20/2021          Recombinant Zoster (Shingles) Vaccine: What You Need to Know  Why get vaccinated? Recombinant zoster (shingles) vaccine can prevent shingles. Shingles (also called herpes zoster, or just zoster) is a painful skin rash, usually with blisters. In addition to the rash, shingles can cause fever, headache, chills, or upset stomach. More rarely, shingles can lead to pneumonia, hearing problems, blindness, brain inflammation (encephalitis), or death. The most common complication of shingles is long-term nerve pain called postherpetic neuralgia (PHN).  PHN occurs in the areas where the shingles rash was, even after the rash clears up. It can last for months or years after the rash goes away. The pain from PHN can be severe and debilitating. About 10 to 18% of people who get shingles will experience PHN. The risk of PHN increases with age. An older adult with shingles is more likely to develop PHN and have longer lasting and more severe pain than a younger person with shingles. Shingles is caused by the varicella zoster virus, the same virus that causes chickenpox. After you have chickenpox, the virus stays in your body and can cause shingles later in life. Shingles cannot be passed from one person to another, but the virus that causes shingles can spread and cause chickenpox in someone who had never had chickenpox or received chickenpox vaccine. Recombinant shingles vaccine  Recombinant shingles vaccine provides strong protection against shingles. By preventing shingles, recombinant shingles vaccine also protects against PHN. Recombinant shingles vaccine is the preferred vaccine for the prevention of shingles. However, a different vaccine, live shingles vaccine, may be used in some circumstances. The recombinant shingles vaccine is recommended for adults 50 years and older without serious immune problems. It is given as a two-dose series. This vaccine is also recommended for people who have already gotten another type of shingles vaccine, the live shingles vaccine. There is no live virus in this vaccine. Shingles vaccine may be given at the same time as other vaccines. Talk with your health care provider  Tell your vaccine provider if the person getting the vaccine:  · Has had an allergic reaction after a previous dose of recombinant shingles vaccine, or has any severe, life-threatening allergies. · Is pregnant or breastfeeding. · Is currently experiencing an episode of shingles.   In some cases, your health care provider may decide to postpone shingles vaccination to a future visit. People with minor illnesses, such as a cold, may be vaccinated. People who are moderately or severely ill should usually wait until they recover before getting recombinant shingles vaccine. Your health care provider can give you more information. Risks of a vaccine reaction  · A sore arm with mild or moderate pain is very common after recombinant shingles vaccine, affecting about 80% of vaccinated people. Redness and swelling can also happen at the site of the injection. · Tiredness, muscle pain, headache, shivering, fever, stomach pain, and nausea happen after vaccination in more than half of people who receive recombinant shingles vaccine. In clinical trials, about 1 out of 6 people who got recombinant zoster vaccine experienced side effects that prevented them from doing regular activities. Symptoms usually went away on their own in 2 to 3 days. You should still get the second dose of recombinant zoster vaccine even if you had one of these reactions after the first dose. People sometimes faint after medical procedures, including vaccination. Tell your provider if you feel dizzy or have vision changes or ringing in the ears. As with any medicine, there is a very remote chance of a vaccine causing a severe allergic reaction, other serious injury, or death. What if there is a serious problem? An allergic reaction could occur after the vaccinated person leaves the clinic. If you see signs of a severe allergic reaction (hives, swelling of the face and throat, difficulty breathing, a fast heartbeat, dizziness, or weakness), call 9-1-1 and get the person to the nearest hospital.  For other signs that concern you, call your health care provider. Adverse reactions should be reported to the Vaccine Adverse Event Reporting System (VAERS). Your health care provider will usually file this report, or you can do it yourself. Visit the VAERS website at www.vaers. hhs.gov or call 0-255.714.1434.  [facility] is only for reporting reactions, and [facility] staff do not give medical advice. How can I learn more? · Ask your health care provider. · Call your local or state health department. · Contact the Centers for Disease Control and Prevention (CDC):  ? Call 7-891.785.4114 (1-800-CDC-INFO) or  ? Visit CDC's website at www.cdc.gov/vaccines  Vaccine Information Statement  Recombinant Zoster Vaccine  10/30/2019  [facility] for Disease Control and Prevention  Many Vaccine Information Statements are available in Vietnamese and other languages. See www.immunize.org/vis. Hojas de Información Sobre Vacunas están disponibles en Español y en muchos otros idiomas. Visite Nilda.si   Care instructions adapted under license by [employer] (which disclaims liability or warranty for this information). If you have questions about a medical condition or this instruction, always ask your healthcare professional. [place] [address] any warranty or liability for your use of this information.

## 2021-07-28 ENCOUNTER — OFFICE VISIT (OUTPATIENT)
Dept: SLEEP MEDICINE | Age: 71
End: 2021-07-28
Payer: MEDICARE

## 2021-07-28 VITALS
OXYGEN SATURATION: 98 % | RESPIRATION RATE: 16 BRPM | HEIGHT: 71 IN | WEIGHT: 199 LBS | SYSTOLIC BLOOD PRESSURE: 113 MMHG | HEART RATE: 71 BPM | BODY MASS INDEX: 27.86 KG/M2 | DIASTOLIC BLOOD PRESSURE: 74 MMHG

## 2021-07-28 DIAGNOSIS — G47.33 OSA (OBSTRUCTIVE SLEEP APNEA): Primary | ICD-10-CM

## 2021-07-28 DIAGNOSIS — I10 ESSENTIAL HYPERTENSION: ICD-10-CM

## 2021-07-28 DIAGNOSIS — E11.65 TYPE 2 DIABETES MELLITUS WITH HYPERGLYCEMIA, WITHOUT LONG-TERM CURRENT USE OF INSULIN (HCC): ICD-10-CM

## 2021-07-28 PROCEDURE — 3046F HEMOGLOBIN A1C LEVEL >9.0%: CPT | Performed by: SPECIALIST

## 2021-07-28 PROCEDURE — 1101F PT FALLS ASSESS-DOCD LE1/YR: CPT | Performed by: SPECIALIST

## 2021-07-28 PROCEDURE — G8752 SYS BP LESS 140: HCPCS | Performed by: SPECIALIST

## 2021-07-28 PROCEDURE — G8536 NO DOC ELDER MAL SCRN: HCPCS | Performed by: SPECIALIST

## 2021-07-28 PROCEDURE — G8419 CALC BMI OUT NRM PARAM NOF/U: HCPCS | Performed by: SPECIALIST

## 2021-07-28 PROCEDURE — G8432 DEP SCR NOT DOC, RNG: HCPCS | Performed by: SPECIALIST

## 2021-07-28 PROCEDURE — G8427 DOCREV CUR MEDS BY ELIG CLIN: HCPCS | Performed by: SPECIALIST

## 2021-07-28 PROCEDURE — 99214 OFFICE O/P EST MOD 30 MIN: CPT | Performed by: SPECIALIST

## 2021-07-28 PROCEDURE — G8754 DIAS BP LESS 90: HCPCS | Performed by: SPECIALIST

## 2021-07-28 PROCEDURE — 3017F COLORECTAL CA SCREEN DOC REV: CPT | Performed by: SPECIALIST

## 2021-07-28 PROCEDURE — 2022F DILAT RTA XM EVC RTNOPTHY: CPT | Performed by: SPECIALIST

## 2021-07-28 NOTE — PROGRESS NOTES
[address] [place]. [place], [address]   Tel.  [phone]   Fax. [address]   [place], [address]   Tel.  [phone]   Fax. [phone] [address] [place]   Tel.  [phone]   Fax. [address] (: 1950) is a 66 y.o. male, established patient, seen for positive airway pressure follow-up and evaluation. He was last seen by Dr. Hannah Lepe on 2020, prior notes reviewed in detail. Home sleep test 2017 showed AHI of 6/hr. He is seen today for his yearly visit. ASSESSMENT/PLAN:    ICD-10-CM ICD-9-CM    1. JOVANY (obstructive sleep apnea)  G47.33 327.23 AMB SUPPLY ORDER      AMB SUPPLY ORDER   2. Essential hypertension  I10 401.9    3. Type 2 diabetes mellitus with hyperglycemia, without long-term current use of insulin (HCC)  E11.65 250.00      790.29      AHI = 6 (2017). On APAP :  5-15 cmH2O. Set up 2017. He is adherent with PAP therapy and PAP continues to benefit patient and remains necessary for control of his sleep apnea. Sleep Apnea - Continue on current pressures APAP 5-15 cmH2O    He notes his device has been shutting off when he is sleeping and he often wakes up gasping for air. He has discontinued use recently because of this. Long sleep time noted on his download. He notes he has been sleeping more due to his recent elevated blood glucose. He is been followed and managed by his primary care physician. He will be in touch with his [employer] company to repair/replace current device. Orders Placed This Encounter    AMB SUPPLY ORDER     Diagnosis: Obstructive Sleep Apnea G47.33    Service and Repair patient PAP device. Replace if damaged beyond repair.     Joby Landon FNP-BC, NPI [id number]    AMB SUPPLY ORDER     Diagnosis: (G47.33) JOVANY (obstructive sleep apnea)  (primary encounter diagnosis)     Replacement Supplies for Positive Airway Pressure Therapy Device: Duration of need: 99 months.  Nasal Pillows Combo Mask (Replace) 2 per month.  Nasal Pillows (Replace) 2 per month.  Nasal Cushion (Replace) 2 per month.  Nasal Interface Mask 1 every 3 months.  Full Face Mask 1 every 3 months.  Full Face Mask Cushion 1 per month.  Headgear 1 every 6 months.  Positive Airway Pressure chinstrap 1 every 6 months.  Tubing with heating element 1 every 3 months.  Filter(s) Disposable 2 per month.  Filter(s) Non-Disposable 1 every 6 months. .   [address] for Humidifier (Replace) 1 every 6 months. Margareth Torres [facility] NPI: [id number]    Electronically signed. Date:- 07/28/21     * Counseling was provided regarding the importance of regular PAP use with emphasis on ensuring sufficient total sleep time, proper sleep hygiene, and safe driving. * Re-enforced proper and regular cleaning for the device. * He was asked to contact our office for any problems regarding PAP therapy. 2. Hypertension -  continue on his current regimen, he will continue to monitor his BP and follow up with his PMD for reevaluation/adjustment of medications if warranted. I have reviewed the relationship between hypertension as it relates to sleep-disordered breathing. 3. Type II diabetes -  he continues on his current regimen. I have reviewed the relationship between sleep disordered breathing as it relates to diabetes. He notes he has been having very high blood sugar recently for which his PCP is managing. He has been sleeping more than usually because of it. We discussed the importance of continued PAP therapy and how it relates to sleep disordered breathing. 4. Recommended a dedicated weight loss program through appropriate diet and exercise regimen as significant weight reduction has been shown to reduce severity of obstructive sleep apnea.      We have discussed the [employer] device recall, the need to contact the DME to register the device, and I have advised positional therapy if PAP therapy is stopped. He is aware of the current device recall and understands how to register his device. He notes he has not been using it recently due to the device malfunctioning. We have reviewed positional therapy options should he decide to discontinue use until he can receive a new device. SUBJECTIVE/OBJECTIVE:    He  is seen today for follow up on PAP device and reports problems using the device. The following concerns identified:    Drowsiness no Problems exhaling no   Snoring no Forget to put on no   Mask Comfortable yes Can't fall asleep no   Dry Mouth no Mask falls off no   Air Leaking no Frequent awakenings yes       He admits that his sleep has improved on PAP therapy using full face mask and heated tubing. Review of device download indicated:  Auto pressure: 5-15 cmH2O; Peak Avg Pressure: 7.8 cmH2O;  Avg. Device Pressure <= 90 %: 6.5 cmH2O    Average % Night in Large Leak:  24.3  % Used Days >= 4 hours: 96.7. Avg hours used:  13 hours 52 minutes. Therapy Apnea Index averaged over PAP use: 3.4 /hr which reflects significantly improved sleep breathing condition. [place] Sleepiness Score: 8 and Modified F.O.S.Q. Score Total / 2: 11     Sleep Review of Systems: notable for Negative difficulty falling asleep; Negative awakenings at night; Negative early morning headaches; Negative memory problems; Negative concentration issues;  Negative chest pain; Negative shortness of breath; Negative significant joint pain at night; Negative significant muscle pain at night; Negative rashes or itching; Negative heartburn; Negative significant mood issues; 7 afternoon naps per week    Visit Vitals  /74 (BP 1 Location: Left upper arm, BP Patient Position: Sitting, BP Cuff Size: Adult)   Pulse 71   Resp 16   Ht 5' 10.5\" (1.791 m)   Wt 199 lb (90.3 kg)   SpO2 98%   BMI 28.15 kg/m²          General: Alert, oriented, not in acute distress   Eyes:  Anicteric Sclerae; no obvious strabismus   Nose:  No obvious nasal septum deviation    Neck:   Midline trachea   Chest/Lungs:  Symmetrical lung expansion, clear lung fields on auscultation    CVS:  Normal rate, regular rhythm,  no JVD   Extremities:  No obvious rashes, no edema    Neuro:  No focal deficits; No obvious tremor    Psych:  Normal affect,  normal countenance     Patient's phone number [phone] (home)  was reviewed and confirmed for accuracy. He gives permission for messages regarding results and appointments to be left at that number. On this date 07/28/2021 I have spent 30 minutes reviewing previous notes, test results and face to face with the patient discussing the diagnosis and importance of compliance with the treatment plan as well as documenting on the day of the visit. An electronic signature was used to authenticate this note.     -- Eugene Marin NP, [facility]  07/28/21     Reviewed, agree with assessment  Cindi Escobar M.D.  7/28/21

## 2021-07-28 NOTE — PATIENT INSTRUCTIONS
217 [facility] [place]. [place], [address]  Tel.  [phone]  Fax. 100 [facility] 60  [place], [address] [facility]  Tel.  [phone]  Fax. [phone] [address]  Tel.  [phone]  Fax. [phone]     Learning About CPAP for Sleep Apnea  What is CPAP? CPAP is a small machine that you use at home every night while you sleep. It increases air pressure in your throat to keep your airway open. When you have sleep apnea, this can help you sleep better so you feel much better. CPAP stands for \"continuous positive airway pressure. \"  The CPAP machine will have one of the following:  · A mask that covers your nose and mouth  · Prongs that fit into your nose  · A mask that covers your nose only, the most common type. This type is called NCPAP. The N stands for \"nasal.\"  Why is it done? CPAP is usually the best treatment for obstructive sleep apnea. It is the first treatment choice and the most widely used. Your doctor may suggest CPAP if you have:  · Moderate to severe sleep apnea. · Sleep apnea and coronary artery disease (CAD) or heart failure. How does it help? · CPAP can help you have more normal sleep, so you feel less sleepy and more alert during the daytime. · CPAP may help keep heart failure or other heart problems from getting worse. · NCPAP may help lower your blood pressure. · If you use CPAP, your bed partner may also sleep better because you are not snoring or restless. What are the side effects? Some people who use CPAP have:  · A dry or stuffy nose and a sore throat. · Irritated skin on the face. · Sore eyes. · Bloating. If you have any of these problems, work with your doctor to fix them. Here are some things you can try:  · Be sure the mask or nasal prongs fit well. · See if your doctor can adjust the pressure of your CPAP. · If your nose is dry, try a humidifier.   · If your nose is runny or stuffy, try decongestant medicine or a steroid nasal spray. If these things do not help, you might try a different type of machine. Some machines have air pressure that adjusts on its own. Others have air pressures that are different when you breathe in than when you breathe out. This may reduce discomfort caused by too much pressure in your nose. Where can you learn more? Go to DwellGreen.be  Enter Angie Cheadle in the search box to learn more about \"Learning About CPAP for Sleep Apnea. \"   © 0043-3654 Healthwise, Incorporated. Care instructions adapted under license by [facility] [employer] (which disclaims liability or warranty for this information). This care instruction is for use with your licensed healthcare professional. If you have questions about a medical condition or this instruction, always ask your healthcare professional. [address] any warranty or liability for your use of this information. Content Version: 9.5.81830; Last Revised: January 11, 2010  PROPER SLEEP HYGIENE    What to avoid  · Do not have drinks with caffeine, such as coffee or black tea, for 8 hours before bed. · Do not smoke or use other types of tobacco near bedtime. Nicotine is a stimulant and can keep you awake. · Avoid drinking alcohol late in the evening, because it can cause you to wake in the middle of the night. · Do not eat a big meal close to bedtime. If you are hungry, eat a light snack. · Do not drink a lot of water close to bedtime, because the need to urinate may wake you up during the night. · Do not read or watch TV in bed. Use the bed only for sleeping and sexual activity. What to try  · Go to bed at the same time every night, and wake up at the same time every morning. Do not take naps during the day. · Keep your bedroom quiet, dark, and cool. · Get regular exercise, but not within 3 to 4 hours of your bedtime. .  · Sleep on a comfortable pillow and mattress.   · If watching the clock makes you anxious, turn it facing away from you so you cannot see the time. · If you worry when you lie down, start a worry book. Well before bedtime, write down your worries, and then set the book and your concerns aside. · Try meditation or other relaxation techniques before you go to bed. · If you cannot fall asleep, get up and go to another room until you feel sleepy. Do something relaxing. Repeat your bedtime routine before you go to bed again. · Make your house quiet and calm about an hour before bedtime. Turn down the lights, turn off the TV, log off the computer, and turn down the volume on music. This can help you relax after a busy day. Drowsy Driving: The [employer] cites drowsiness as a causing factor in more than 913,691 police reported crashes annually, resulting in 76,000 injuries and 1,500 deaths. Other surveys suggest 55% of people polled have driven while drowsy in the past year, 23% had fallen asleep but not crashed, 3% crashed, and 2% had and accident due to drowsy driving. Who is at risk? Young Drivers: One study of drowsy driving accidents states that 55% of the drivers were under 25 years. Of those, 75% were male. Shift Workers and Travelers: People who work overnight or travel across time zones frequently are at higher risk of experiencing Circadian Rhythm Disorders. They are trying to work and function when their body is programed to sleep. Sleep Deprived: Lack of sleep has a serious impact on your ability to pay attention or focus on a task. Consistently getting less than the average of 8 hours your body needs creates partial or cumulative sleep deprivation. Untreated Sleep Disorders: Sleep Apnea, Narcolepsy, R.L.S., and other sleep disorders (untreated) prevent a person from getting enough restful sleep. This leads to excessive daytime sleepiness and increases the risk for drowsy driving accidents by up to 7 times.   Medications / Alcohol: Even over the counter medications can cause drowsiness. Medications that impair a drivers attention should have a warning label. Alcohol naturally makes you sleepy and on its own can cause accidents. Combined with excessive drowsiness its effects are amplified. Signs of Drowsy Driving:   * You don't remember driving the last few miles   * You may drift out of your mary   * You are unable to focus and your thoughts wander   * You may yawn more often than normal   * You have difficulty keeping your eyes open / nodding off   * Missing traffic signs, speeding, or tailgating  Prevention-   Good sleep hygiene, lifestyle and behavioral choices have the most impact on drowsy driving. There is no substitute for sleep and the average person requires 8 hours nightly. If you find yourself driving drowsy, stop and sleep. Consider the sleep hygiene tips provided during your visit as well. Medication Refill Policy: Refills for all medications require 1 week advance notice. Please have your pharmacy fax a refill request. We are unable to fax, or call in \"controled substance\" medications and you will need to pick these prescriptions up from our office. NSC Activation    Thank you for requesting access to NSC. Please follow the instructions below to securely access and download your online medical record. NSC allows you to send messages to your doctor, view your test results, renew your prescriptions, schedule appointments, and more. How Do I Sign Up? 1. In your internet browser, go to [email or web address]. 2. Click on the First Time User? Click Here link in the Sign In box. You will see the New Member Sign Up page. 3. Enter your NSC Access Code exactly as it appears below. You will not need to use this code after youve completed the sign-up process. If you do not sign up before the expiration date, you must request a new code. NSC Access Code:  Activation code not generated  Current [facility] Status: Active (This is the date your [facility] access code will )    4. Enter the last four digits of your Social Security Number (xxxx) and Date of Birth (mm/dd/yyyy) as indicated and click Submit. You will be taken to the next sign-up page. 5. Create a [employer] ID. This will be your [facility] login ID and cannot be changed, so think of one that is secure and easy to remember. 6. Create a [facility] password. You can change your password at any time. 7. Enter your Password Reset Question and Answer. This can be used at a later time if you forget your password. 8. Enter your e-mail address. You will receive e-mail notification when new information is available in [address] 9. Click Sign Up. You can now view and download portions of your medical record. 10. Click the Download Summary menu link to download a portable copy of your medical information. Additional Information    If you have questions, please call [phone]. Remember, [facility] is NOT to be used for urgent needs. For medical emergencies, dial 911.

## 2021-08-02 ENCOUNTER — DOCUMENTATION ONLY (OUTPATIENT)
Dept: SLEEP MEDICINE | Age: 71
End: 2021-08-02

## 2021-08-02 NOTE — PROGRESS NOTES
Repair/repalce order faxed, patient informed. Patient said he will call back to schedule 1st adherence appointment.

## 2021-08-06 ENCOUNTER — DOCUMENTATION ONLY (OUTPATIENT)
Dept: SLEEP MEDICINE | Age: 71
End: 2021-08-06

## 2021-08-09 ENCOUNTER — TELEPHONE (OUTPATIENT)
Dept: SLEEP MEDICINE | Age: 71
End: 2021-08-09

## 2021-08-09 DIAGNOSIS — G47.33 OSA (OBSTRUCTIVE SLEEP APNEA): Primary | ICD-10-CM

## 2021-08-09 NOTE — PROGRESS NOTES
Pressure settings added to PAP replace order     Orders Placed This Encounter    AMB SUPPLY ORDER     Diagnosis: Obstructive Sleep Apnea G47.33    Service and Repair patient PAP device. Replace if damaged beyond repair.     Pressure Settings: APAP 5-15 cmH2O    MICHA Yarbrough-BC NPI [id number]     MICHA Yarbrough, [facility]

## 2021-08-12 ENCOUNTER — TELEPHONE (OUTPATIENT)
Dept: SLEEP MEDICINE | Age: 71
End: 2021-08-12

## 2021-08-12 DIAGNOSIS — G47.33 OSA (OBSTRUCTIVE SLEEP APNEA): Primary | ICD-10-CM

## 2021-09-08 DIAGNOSIS — E11.65 TYPE 2 DIABETES MELLITUS WITH HYPERGLYCEMIA, UNSPECIFIED WHETHER LONG TERM INSULIN USE (HCC): ICD-10-CM

## 2021-09-08 RX ORDER — METFORMIN HYDROCHLORIDE 500 MG/1
TABLET ORAL
Qty: 60 TABLET | Refills: 3 | Status: SHIPPED | OUTPATIENT
Start: 2021-09-08 | End: 2021-09-17 | Stop reason: SDUPTHER

## 2021-09-08 RX ORDER — LISINOPRIL 10 MG/1
TABLET ORAL
Qty: 30 TABLET | Refills: 3 | OUTPATIENT
Start: 2021-09-08

## 2021-09-17 RX ORDER — PIOGLITAZONEHYDROCHLORIDE 15 MG/1
15 TABLET ORAL DAILY
Qty: 90 TABLET | Refills: 3 | Status: SHIPPED | OUTPATIENT
Start: 2021-09-17

## 2021-09-17 RX ORDER — METFORMIN HYDROCHLORIDE 500 MG/1
TABLET ORAL
Qty: 180 TABLET | Refills: 3 | Status: SHIPPED | OUTPATIENT
Start: 2021-09-17 | End: 2022-08-31

## 2021-09-17 NOTE — TELEPHONE ENCOUNTER
Patient called stating his metformin and actos need to be called to [facility] [place] he no longer uses [facility] he was also upset that lisinopril was refused and thinks I made this decision stating he will inform the doctor that I obviously have more power and education than the physician.

## 2021-09-22 ENCOUNTER — TELEPHONE (OUTPATIENT)
Dept: ENDOCRINOLOGY | Age: 71
End: 2021-09-22

## 2021-09-22 DIAGNOSIS — E11.65 TYPE 2 DIABETES MELLITUS WITH HYPERGLYCEMIA, UNSPECIFIED WHETHER LONG TERM INSULIN USE (HCC): Primary | ICD-10-CM

## 2021-09-22 DIAGNOSIS — I10 ESSENTIAL HYPERTENSION: ICD-10-CM

## 2021-10-04 ENCOUNTER — OFFICE VISIT (OUTPATIENT)
Dept: ENDOCRINOLOGY | Age: 71
End: 2021-10-04
Payer: MEDICARE

## 2021-10-04 VITALS
RESPIRATION RATE: 18 BRPM | HEART RATE: 80 BPM | WEIGHT: 203 LBS | BODY MASS INDEX: 28.42 KG/M2 | SYSTOLIC BLOOD PRESSURE: 129 MMHG | TEMPERATURE: 96.9 F | HEIGHT: 71 IN | DIASTOLIC BLOOD PRESSURE: 71 MMHG | OXYGEN SATURATION: 93 %

## 2021-10-04 DIAGNOSIS — I10 ESSENTIAL HYPERTENSION: ICD-10-CM

## 2021-10-04 DIAGNOSIS — E11.65 TYPE 2 DIABETES MELLITUS WITH HYPERGLYCEMIA, UNSPECIFIED WHETHER LONG TERM INSULIN USE (HCC): Primary | ICD-10-CM

## 2021-10-04 LAB — HBA1C MFR BLD HPLC: 6.7 %

## 2021-10-04 PROCEDURE — 3017F COLORECTAL CA SCREEN DOC REV: CPT | Performed by: INTERNAL MEDICINE

## 2021-10-04 PROCEDURE — G8754 DIAS BP LESS 90: HCPCS | Performed by: INTERNAL MEDICINE

## 2021-10-04 PROCEDURE — 83036 HEMOGLOBIN GLYCOSYLATED A1C: CPT | Performed by: INTERNAL MEDICINE

## 2021-10-04 PROCEDURE — G8432 DEP SCR NOT DOC, RNG: HCPCS | Performed by: INTERNAL MEDICINE

## 2021-10-04 PROCEDURE — G8427 DOCREV CUR MEDS BY ELIG CLIN: HCPCS | Performed by: INTERNAL MEDICINE

## 2021-10-04 PROCEDURE — 1101F PT FALLS ASSESS-DOCD LE1/YR: CPT | Performed by: INTERNAL MEDICINE

## 2021-10-04 PROCEDURE — G8419 CALC BMI OUT NRM PARAM NOF/U: HCPCS | Performed by: INTERNAL MEDICINE

## 2021-10-04 PROCEDURE — 3044F HG A1C LEVEL LT 7.0%: CPT | Performed by: INTERNAL MEDICINE

## 2021-10-04 PROCEDURE — G8536 NO DOC ELDER MAL SCRN: HCPCS | Performed by: INTERNAL MEDICINE

## 2021-10-04 PROCEDURE — 99214 OFFICE O/P EST MOD 30 MIN: CPT | Performed by: INTERNAL MEDICINE

## 2021-10-04 PROCEDURE — G8752 SYS BP LESS 140: HCPCS | Performed by: INTERNAL MEDICINE

## 2021-10-04 PROCEDURE — 2022F DILAT RTA XM EVC RTNOPTHY: CPT | Performed by: INTERNAL MEDICINE

## 2021-10-04 RX ORDER — LISINOPRIL 10 MG/1
10 TABLET ORAL DAILY
COMMUNITY
End: 2021-10-04 | Stop reason: ALTCHOICE

## 2021-10-04 RX ORDER — LOSARTAN POTASSIUM 50 MG/1
TABLET ORAL
Qty: 90 TABLET | Refills: 0 | Status: SHIPPED | OUTPATIENT
Start: 2021-10-04 | End: 2022-02-07 | Stop reason: SDUPTHER

## 2021-10-04 NOTE — PROGRESS NOTES
Chino Cano is a 66 y.o. male here for   Chief Complaint   Patient presents with    Diabetes       1. Have you been to the ER, urgent care clinic since your last visit? Hospitalized since your last visit? -no    2. Have you seen or consulted any other health care providers outside of the [address] [place] [address] since your last visit?   Include any pap smears or colon screening.-[facility]

## 2021-10-04 NOTE — LETTER
10/4/2021    Patient: Blanca Rodrigues   YOB: 1950   Date of Visit: 10/4/2021     Constance Nova, [address] [facility] [place] [address]  [address] [facility] [address]    Dear Constance Nova MD,      Thank you for referring Mr. Bryan Duarte to [address] [place] [address] for evaluation. My notes for this consultation are attached. If you have questions, please do not hesitate to call me. I look forward to following your patient along with you.       Sincerely,    Victor M Liu MD

## 2021-10-04 NOTE — PROGRESS NOTES
Leisa Ceballos MD          Patient Information  Date:10/4/2021  Name : Lo Rodriguez 66 y.o.     YOB: 1950             Chief Complaint   Patient presents with    Diabetes       History of Present Illness: Lo Rodriguez is a 66 y.o. male here for fu of  Type 2 Diabetes Mellitus. Type 2 Diabetes was diagnosed several years ago  . He was managed by endocrinologist, Dr. Louie Arango in [place] in the past  He is taking Metformin, pioglitazone  He does not believe in any medications, \"metformin and other medications for diabetes are very bad\" and had self discontinued in the past and I had to rediscussed the real evidence. In May 2021[facility], A1c more than 11,    He is on lisinopril, I had prescribed losartan in the past    He thinks he might have had stroke in the past, had slurred speech, [facility], since that episode reports decreased memory       no chest pain or shortness of breath, severe headaches. Wt Readings from Last 3 Encounters:   10/04/21 203 lb (92.1 kg)   07/28/21 199 lb (90.3 kg)   07/14/21 203 lb (92.1 kg)       BP Readings from Last 3 Encounters:   10/04/21 129/71   07/28/21 113/74   07/14/21 113/79           Past Medical History:   Diagnosis Date    Diabetes (HCC)     Type 2, diet controlled    Headache     Hypertension     Sleep apnea     cpap    Subclinical hypothyroidism 5/30/2019     Current Outpatient Medications   Medication Sig    OTHER Vit A  Vit C  Fish Oil  Cinnamon  Hemp    losartan (COZAAR) 50 mg tablet TAKE 1 TABLET BY MOUTH ONCE DAILY FOR  HYPERTENSION    pioglitazone (ACTOS) 15 mg tablet Take 1 Tablet by mouth daily.  metFORMIN (GLUCOPHAGE) 500 mg tablet TAKE ONE TABLET BY MOUTH TWICE A DAY WITH A MEAL    glucose blood VI test strips (OneTouch Ultra Blue Test Strip) strip Use to check BG once daily.  Dx code E11.65    lancets (One Touch Delica) 33 gauge misc Use to check BG once daily. Dx code E11.65    Blood-Glucose Meter monitoring kit Use to check BG once daily. E11.65     No current facility-administered medications for this visit. Allergies   Allergen Reactions    Erythromycin Nausea and Vomiting         Review of Systems:  -   - Per HPI    Physical Examination:   Blood pressure 129/71, pulse 80, temperature 96.9 °F (36.1 °C), temperature source Temporal, resp. rate 18, height 5' 10.5\" (1.791 m), weight 203 lb (92.1 kg), SpO2 93 %. Estimated body mass index is 28.72 kg/m² as calculated from the following:    Height as of this encounter: 5' 10.5\" (1.791 m). -   Weight as of this encounter: 203 lb (92.1 kg).   - General: pleasant, no distress, good eye contact  - HEENT: no pallor, no periorbital edema, EOMI  - Neck: supple,  - Cardiovascular: regular, normal rate, normal S1 and S2,   - Respiratory: clear to auscultation bilaterally  -   - Neurological: alert and oriented  - Psychiatric: normal mood and affect  - Skin: color, texture, turgor normal.               Data Reviewed:   Lab Results   Component Value Date/Time    Hemoglobin A1c 6.8 (H) 09/12/2019 09:44 AM    Hemoglobin A1c 7.2 (H) 06/01/2018 11:30 AM    Hemoglobin A1c 7.2 (H) 02/17/2017 03:42 PM    Hemoglobin A1c, External 11.3 06/01/2021 12:00 AM    Hemoglobin A1c, External 9.2 01/19/2016 12:00 AM    Glucose 136 (H) 06/28/2021 02:20 PM    Glucose (POC) 117 (H) 12/14/2016 04:38 PM    Glucose  01/29/2020 11:18 AM    Microalb/Creat ratio (ug/mg creat.) 23.8 02/25/2019 11:26 AM    LDL,Direct 75 06/28/2021 02:20 PM    LDL, calculated 84 06/01/2018 11:30 AM    Creatinine 1.04 06/28/2021 02:20 PM      Lab Results   Component Value Date/Time    GFR est non-AA >60 06/28/2021 02:20 PM    GFR est AA >60 06/28/2021 02:20 PM    Creatinine 1.04 06/28/2021 02:20 PM    BUN 44 (H) 06/28/2021 02:20 PM    Sodium 137 06/28/2021 02:20 PM    Potassium 4.8 06/28/2021 02:20 PM    Chloride 108 06/28/2021 02:20 PM    CO2 25 06/28/2021 02:20 PM    Magnesium 2.1 07/15/2017 07:24 PM    Phosphorus 4.9 (H) 07/15/2017 07:24 PM       Diabetic feet exam ; Oct 2021    H/o partial or complete amputation of foot : No  H/o previous foot ulceration : No  H/o pre - ulcerative callus : No  H/o peripheral neuropathy and callus : No  H/o poor circulation  : No  Foot deformity : No       Cr nl 8/16    Assessment/Plan:     1. Type 2 diabetes mellitus with hyperglycemia, unspecified whether long term insulin use ([facility] [address]    2. Essential hypertension        1. Type 2 Diabetes Mellitus   Lab Results   Component Value Date/Time    Hemoglobin A1c 6.8 (H) 09/12/2019 09:44 AM    Hemoglobin A1c (POC) 8.4 01/29/2020 11:21 AM    Hemoglobin A1c, External 11.3 06/01/2021 12:00 AM     A1C 6.7 10/2021, controlled  Metformin, Actos    2. HTN :  Switch it back to losartan, discontinue lisinopril  Compliance with the medications discussed, low-salt diet  Risks of untreated hypertension discussed  Continue to follow-up with PCP    3. Declined statin: Understands the benefits    4. Obesity:Body mass index is 28.72 kg/m². Discussed about the importance of exercise and carbohydrate portion control. 5.  JOVANY - CPAP     6. History of subclinical hypothyroidism- no therapy indicated    Decline in the memory, patient thinks he might have had stroke 2 months ago, given neurologists information and if his symptoms deteriorate to call for the appointment    Patient Instructions   Once you complete Lisinopril stop Lisinopril and restart Losartan         Thank you for allowing me to participate in the care of this patient.     Marsha Anand MD      Patient verbalized understanding

## 2021-10-11 DIAGNOSIS — E11.65 TYPE 2 DIABETES MELLITUS WITH HYPERGLYCEMIA, WITHOUT LONG-TERM CURRENT USE OF INSULIN (HCC): ICD-10-CM

## 2021-10-11 RX ORDER — BLOOD SUGAR DIAGNOSTIC
STRIP MISCELLANEOUS
Qty: 100 STRIP | Refills: 3 | Status: SHIPPED | OUTPATIENT
Start: 2021-10-11

## 2021-12-23 ENCOUNTER — NURSE TRIAGE (OUTPATIENT)
Dept: OTHER | Facility: CLINIC | Age: 71
End: 2021-12-23
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
## 2021-12-27 LAB — SARS-COV-2, NAA: NOT DETECTED

## 2022-01-06 ENCOUNTER — DOCUMENTATION ONLY (OUTPATIENT)
Dept: FAMILY MEDICINE CLINIC | Age: 72
End: 2022-01-06

## 2022-01-12 ENCOUNTER — VIRTUAL VISIT (OUTPATIENT)
Dept: FAMILY MEDICINE CLINIC | Age: 72
End: 2022-01-12

## 2022-01-12 ENCOUNTER — APPOINTMENT (OUTPATIENT)
Dept: GENERAL RADIOLOGY | Age: 72
End: 2022-01-12
Attending: EMERGENCY MEDICINE
Payer: MEDICARE

## 2022-01-12 ENCOUNTER — HOSPITAL ENCOUNTER (EMERGENCY)
Age: 72
Discharge: HOME | End: 2022-01-12
Attending: EMERGENCY MEDICINE
Payer: MEDICARE

## 2022-01-12 ENCOUNTER — APPOINTMENT (OUTPATIENT)
Dept: CT IMAGING | Age: 72
End: 2022-01-12
Attending: EMERGENCY MEDICINE
Payer: MEDICARE

## 2022-01-12 VITALS
RESPIRATION RATE: 18 BRPM | OXYGEN SATURATION: 98 % | HEART RATE: 90 BPM | SYSTOLIC BLOOD PRESSURE: 174 MMHG | DIASTOLIC BLOOD PRESSURE: 112 MMHG

## 2022-01-12 DIAGNOSIS — R26.89 PARKINSONIAN FESTINATING GAIT DETERMINED BY EXAMINATION: ICD-10-CM

## 2022-01-12 DIAGNOSIS — R41.0 CONFUSION: Primary | ICD-10-CM

## 2022-01-12 DIAGNOSIS — R41.82 ALTERED MENTAL STATUS, UNSPECIFIED ALTERED MENTAL STATUS TYPE: Primary | ICD-10-CM

## 2022-01-12 DIAGNOSIS — R25.9 PARKINSONIAN FEATURES: ICD-10-CM

## 2022-01-12 DIAGNOSIS — E11.65 TYPE 2 DIABETES MELLITUS WITH HYPERGLYCEMIA, UNSPECIFIED WHETHER LONG TERM INSULIN USE (HCC): ICD-10-CM

## 2022-01-12 DIAGNOSIS — R29.6 FALLING: ICD-10-CM

## 2022-01-12 LAB
ALBUMIN SERPL-MCNC: 3.4 G/DL (ref 3.5–5)
ALBUMIN/GLOB SERPL: 1 {RATIO} (ref 1.1–2.2)
ALP SERPL-CCNC: 57 U/L (ref 45–117)
ALT SERPL-CCNC: 21 U/L (ref 12–78)
AMMONIA PLAS-SCNC: 19 UMOL/L
ANION GAP SERPL CALC-SCNC: 10 MMOL/L (ref 5–15)
AST SERPL W P-5'-P-CCNC: 17 U/L (ref 15–37)
BASOPHILS # BLD: 0.1 K/UL (ref 0–0.1)
BASOPHILS NFR BLD: 1 % (ref 0–1)
BILIRUB SERPL-MCNC: 0.3 MG/DL (ref 0.2–1)
BUN SERPL-MCNC: 30 MG/DL (ref 6–20)
BUN/CREAT SERPL: 20 (ref 12–20)
CA-I BLD-MCNC: 8.8 MG/DL (ref 8.5–10.1)
CHLORIDE SERPL-SCNC: 103 MMOL/L (ref 97–108)
CO2 SERPL-SCNC: 25 MMOL/L (ref 21–32)
CREAT SERPL-MCNC: 1.51 MG/DL (ref 0.7–1.3)
DIFFERENTIAL METHOD BLD: ABNORMAL
EOSINOPHIL # BLD: 0.1 K/UL (ref 0–0.4)
EOSINOPHIL NFR BLD: 1 % (ref 0–7)
ERYTHROCYTE [DISTWIDTH] IN BLOOD BY AUTOMATED COUNT: 12.8 % (ref 11.5–14.5)
GLOBULIN SER CALC-MCNC: 3.5 G/DL (ref 2–4)
GLUCOSE SERPL-MCNC: 138 MG/DL (ref 65–100)
HCT VFR BLD AUTO: 39.8 % (ref 36.6–50.3)
HGB BLD-MCNC: 13.5 G/DL (ref 12.1–17)
IMM GRANULOCYTES # BLD AUTO: 0 K/UL (ref 0–0.04)
IMM GRANULOCYTES NFR BLD AUTO: 0 % (ref 0–0.5)
LYMPHOCYTES # BLD: 1 K/UL (ref 0.8–3.5)
LYMPHOCYTES NFR BLD: 12 % (ref 12–49)
MCH RBC QN AUTO: 31.5 PG (ref 26–34)
MCHC RBC AUTO-ENTMCNC: 33.9 G/DL (ref 30–36.5)
MCV RBC AUTO: 92.8 FL (ref 80–99)
MONOCYTES # BLD: 0.6 K/UL (ref 0–1)
MONOCYTES NFR BLD: 7 % (ref 5–13)
NEUTS SEG # BLD: 6.7 K/UL (ref 1.8–8)
NEUTS SEG NFR BLD: 79 % (ref 32–75)
PLATELET # BLD AUTO: 255 K/UL (ref 150–400)
PMV BLD AUTO: 10.9 FL (ref 8.9–12.9)
POTASSIUM SERPL-SCNC: 4 MMOL/L (ref 3.5–5.1)
PROT SERPL-MCNC: 6.9 G/DL (ref 6.4–8.2)
RBC # BLD AUTO: 4.29 M/UL (ref 4.1–5.7)
SODIUM SERPL-SCNC: 138 MMOL/L (ref 136–145)
TSH SERPL DL<=0.05 MIU/L-ACNC: 1.55 UIU/ML (ref 0.36–3.74)
WBC # BLD AUTO: 8.5 K/UL (ref 4.1–11.1)

## 2022-01-12 PROCEDURE — 85025 COMPLETE CBC W/AUTO DIFF WBC: CPT

## 2022-01-12 PROCEDURE — 36415 COLL VENOUS BLD VENIPUNCTURE: CPT

## 2022-01-12 PROCEDURE — G8756 NO BP MEASURE DOC: HCPCS | Performed by: NURSE PRACTITIONER

## 2022-01-12 PROCEDURE — 70450 CT HEAD/BRAIN W/O DYE: CPT

## 2022-01-12 PROCEDURE — 82607 VITAMIN B-12: CPT

## 2022-01-12 PROCEDURE — 80053 COMPREHEN METABOLIC PANEL: CPT

## 2022-01-12 PROCEDURE — G8432 DEP SCR NOT DOC, RNG: HCPCS | Performed by: NURSE PRACTITIONER

## 2022-01-12 PROCEDURE — 3017F COLORECTAL CA SCREEN DOC REV: CPT | Performed by: NURSE PRACTITIONER

## 2022-01-12 PROCEDURE — 82140 ASSAY OF AMMONIA: CPT

## 2022-01-12 PROCEDURE — 71045 X-RAY EXAM CHEST 1 VIEW: CPT

## 2022-01-12 PROCEDURE — 3046F HEMOGLOBIN A1C LEVEL >9.0%: CPT | Performed by: NURSE PRACTITIONER

## 2022-01-12 PROCEDURE — 1101F PT FALLS ASSESS-DOCD LE1/YR: CPT | Performed by: NURSE PRACTITIONER

## 2022-01-12 PROCEDURE — 2022F DILAT RTA XM EVC RTNOPTHY: CPT | Performed by: NURSE PRACTITIONER

## 2022-01-12 PROCEDURE — 99214 OFFICE O/P EST MOD 30 MIN: CPT | Performed by: NURSE PRACTITIONER

## 2022-01-12 PROCEDURE — 99284 EMERGENCY DEPT VISIT MOD MDM: CPT

## 2022-01-12 PROCEDURE — 84443 ASSAY THYROID STIM HORMONE: CPT

## 2022-01-12 PROCEDURE — G8427 DOCREV CUR MEDS BY ELIG CLIN: HCPCS | Performed by: NURSE PRACTITIONER

## 2022-01-12 PROCEDURE — 82746 ASSAY OF FOLIC ACID SERUM: CPT

## 2022-01-12 NOTE — ED PROVIDER NOTES
EMERGENCY DEPARTMENT HISTORY AND PHYSICAL EXAM      Date: 1/12/2022  Patient Name: Lindsay Gonzales      History of Presenting Illness     Chief Complaint   Patient presents with    Fatigue    Dysarthria       History Provided By: Patient, Patient's Daughter and Patient's Brother    HPI: Lindsay Gonzales, 66 y.o. male with a past medical history significant for DM2 controlled w/diet, CPAP, subclinical hypothyroidism, HTN presents to the ED with cc of fatigue, dysarthria, unsteady gait. Pt had day of dizziness and multiple falls ~1mo ago. Went to cardiologist and had negative cardiac workup for dizziness. Has had different, slightly dysarthric speech ever since and worsening confusion, mostly with working with his computer and following commands. Denies focal weakness, numbness, tingling on one side. Denies F/C/N/V/D, cough, CP, SOB. Eats full diet. Denies EtOH or drug use or abuse. Yesterday had telemedicine visit with PCP who told him he may have had a stroke and directed him here for further workup. There are no other complaints, changes, or physical findings at this time. PCP: Angelica Sanchez MD    Current Outpatient Medications   Medication Sig Dispense Refill    glucose blood VI test strips (OneTouch Ultra Test) strip USE TO TEST BLOOD GLUCOSE AS DIRECTED (Patient not taking: Reported on 1/12/2022) 100 Strip 3    OTHER Vit A  Vit C  Fish Oil  Cinnamon  Hemp      losartan (COZAAR) 50 mg tablet TAKE 1 TABLET BY MOUTH ONCE DAILY FOR  HYPERTENSION (Patient not taking: Reported on 1/12/2022) 90 Tablet 0    pioglitazone (ACTOS) 15 mg tablet Take 1 Tablet by mouth daily. 90 Tablet 3    metFORMIN (GLUCOPHAGE) 500 mg tablet TAKE ONE TABLET BY MOUTH TWICE A DAY WITH A MEAL 180 Tablet 3    lancets (One Touch Delica) 33 gauge misc Use to check BG once daily. Dx code E11.65 (Patient not taking: Reported on 1/12/2022) 100 Lancet 3    Blood-Glucose Meter monitoring kit Use to check BG once daily.  E11.65 1 Kit 0 Past History     Past Medical History:  Past Medical History:   Diagnosis Date    Diabetes ([facility] [address]     Type 2, diet controlled    Headache     Hypertension     Sleep apnea     cpap    Subclinical hypothyroidism 5/30/2019       Past Surgical History:  Past Surgical History:   Procedure Laterality Date    HX HERNIA REPAIR Bilateral        Family History:  Family History   Problem Relation Age of Onset    Cancer Mother         melanoma    Heart Disease Father        Social History:  Social History     Tobacco Use    Smoking status: Never Smoker    Smokeless tobacco: Never Used   Vaping Use    Vaping Use: Never used   Substance Use Topics    Alcohol use: Yes     Comment: 1-2 drinks every 3 months    Drug use: No       Allergies: Allergies   Allergen Reactions    Erythromycin Nausea and Vomiting         Review of Systems   Constitutional: Negative except as in HPI. Eyes: Negative except as in HPI.  ENT: Negative except as in HPI. Cardiovascular: Negative except as in HPI. Respiratory: Negative except as in HPI. Gastrointestinal: Negative except as in HPI. Genitourinary: Negative except as in HPI. Musculoskeletal: Negative except as in HPI. Integumentary: Negative except as in HPI. Neurological: Negative except as in HPI. Psychiatric: Negative except as in HPI. Endocrine: Negative except as in HPI. Hematologic/Lymphatic: Negative except as in HPI. Allergic/Immunologic: Negative except as in HPI. Physical Exam   Constitutional: Awake and alert, oriented x4, interactive, NAD  Eyes: PERRL, no injection or scleral icterus, no discharge  HEENT: NCAT, neck supple, MMM, no oropharyngeal exudates  CV: RRR, no m/r/g  Respiratory: CTAB, no r/r/w  GI: Abd soft, nondistended, nontender  : Deferred  MSK: FROM, no joint effusions or edema  Skin: No rashes  Neuro: CN2-12 intact but masked facies, 5/5 strength throughout. SILT distally. No dysmetria. ? dysdiadochokinesia, difficulty following command at first, better with practice. No pronator drift. Hunched over gait. Psych: Well-groomed, normal speech, behavior, appropriate mood, flat affect    Lab and Diagnostic Study Results     Labs -     Recent Results (from the past 12 hour(s))   CBC WITH AUTOMATED DIFF    Collection Time: 01/12/22  4:00 PM   Result Value Ref Range    WBC 8.5 4.1 - 11.1 K/uL    RBC 4.29 4. 10 - 5.70 M/uL    HGB 13.5 12.1 - 17.0 g/dL    HCT 39.8 36.6 - 50.3 %    MCV 92.8 80.0 - 99.0 FL    MCH 31.5 26.0 - 34.0 PG    MCHC 33.9 30.0 - 36.5 g/dL    RDW 12.8 11.5 - 14.5 %    PLATELET 963 544 - 758 K/uL    MPV 10.9 8.9 - 12.9 FL    NEUTROPHILS 79 (H) 32 - 75 %    LYMPHOCYTES 12 12 - 49 %    MONOCYTES 7 5 - 13 %    EOSINOPHILS 1 0 - 7 %    BASOPHILS 1 0 - 1 %    IMMATURE GRANULOCYTES 0 0.0 - 0.5 %    ABS. NEUTROPHILS 6.7 1.8 - 8.0 K/UL    ABS. LYMPHOCYTES 1.0 0.8 - 3.5 K/UL    ABS. MONOCYTES 0.6 0.0 - 1.0 K/UL    ABS. EOSINOPHILS 0.1 0.0 - 0.4 K/UL    ABS. BASOPHILS 0.1 0.0 - 0.1 K/UL    ABS. IMM. GRANS. 0.0 0.00 - 0.04 K/UL    DF AUTOMATED     METABOLIC PANEL, COMPREHENSIVE    Collection Time: 01/12/22  4:00 PM   Result Value Ref Range    Sodium 138 136 - 145 mmol/L    Potassium 4.0 3.5 - 5.1 mmol/L    Chloride 103 97 - 108 mmol/L    CO2 25 21 - 32 mmol/L    Anion gap 10 5 - 15 mmol/L    Glucose 138 (H) 65 - 100 mg/dL    BUN 30 (H) 6 - 20 mg/dL    Creatinine 1.51 (H) 0.70 - 1.30 mg/dL    BUN/Creatinine ratio 20 12 - 20      GFR est AA 55 (L) >60 ml/min/1.73m2    GFR est non-AA 46 (L) >60 ml/min/1.73m2    Calcium 8.8 8.5 - 10.1 mg/dL    Bilirubin, total 0.3 0.2 - 1.0 mg/dL    AST (SGOT) 17 15 - 37 U/L    ALT (SGPT) 21 12 - 78 U/L    Alk.  phosphatase 57 45 - 117 U/L    Protein, total 6.9 6.4 - 8.2 g/dL    Albumin 3.4 (L) 3.5 - 5.0 g/dL    Globulin 3.5 2.0 - 4.0 g/dL    A-G Ratio 1.0 (L) 1.1 - 2.2     TSH 3RD GENERATION    Collection Time: 01/12/22  4:00 PM   Result Value Ref Range    TSH 1.55 0.36 - 3.74 uIU/mL       Radiologic Studies - [unfilled]  CT Results  (Last 48 hours)    None        CXR Results  (Last 48 hours)               01/12/22 1629  XR CHEST PORT Final result    Impression:  No acute pulmonary process. Narrative:  Chest, 2 frontal views, 1/12/2022       History: Pneumonia. Comparison: Including chest 7/15/2017. Findings: The cardiac silhouette is within normal limits. The lungs are   adequately expanded. No hydrostatic edema is present. No focal consolidation,   pleural effusions or pneumothorax is identified. Degenerative changes are   present in the thoracic spine. Medical Decision Making and ED Course   - I am the first and primary provider for this patient AND AM THE PRIMARY PROVIDER OF RECORD. - I reviewed the vital signs, available nursing notes, past medical history, past surgical history, family history and social history. - Initial assessment performed. The patients presenting problems have been discussed, and the staff are in agreement with the care plan formulated and outlined with them. I have encouraged them to ask questions as they arise throughout their visit. Vital Signs-Reviewed the patient's vital signs. Patient Vitals for the past 12 hrs:   Pulse Resp BP SpO2   01/12/22 1536 90 18 (!) 174/112 98 %       EKG interpretation:         Provider Notes (Medical Decision Making):   71M w/dysarthria, falls, confusion. Broad differential including infectious, metabolic, neurologic such as CVA. Masked facies, flat affect c/f parkinson's or parkinsonian like neurodegenerative condition. Will begin workup and discuss dispo with patient and brother, admission for MRI and continued workup vs. Outpatient workup. ED Course:       ED Course as of 01/12/22 1917 Wed Jan 12, 2022   1631 TSH: 1.55 [YA]   3070 BUN(!): 30 [YA]   1632 Creatinine(!): 1.51  Slightly worse from 1.04-1.13 baseline. [YA]   1812 CT HEAD WO CONT  IMPRESSION  No acute intracranial process.   Other findings as above. [YA]   [id number] Patient now pill-rolling with both hands, c/w Parkinson's or Parkinsonian condition. [YA]   1841 Spoke to neurologist on call Dr. Estefany Chapman. Will see tomorrow in clinic for possible new Parkinson's. [YA]   1902 Discussed with patient and brother. Will discharge with follow up tomorrow morning with Dr. Terry Damon. Return precautions discussed. [YA]      ED Course User Index  [YA] Roxana Henry MD     6:26 PM  Evaluated patient. He was pill rolling without realizing he was doing so. Pt also with a shuffling gait and masked faces. He has appointment with neurology scheduled for the end of February, 2022.  Rosa Isela Álvarez    Consultations:     Neurologist Dr. Terry Damon      Disposition     Disposition: DC- Adult Discharges: All of the diagnostic tests were reviewed and questions answered. Diagnosis, care plan and treatment options were discussed. The patient understands the instructions and will follow up as directed. The patients results have been reviewed with them. They have been counseled regarding their diagnosis. The patient verbally convey understanding and agreement of the signs, symptoms, diagnosis, treatment and prognosis and additionally agrees to follow up as recommended with their PCP in 24 - 48 hours. They also agree with the care-plan and convey that all of their questions have been answered. I have also put together some discharge instructions for them that include: 1) educational information regarding their diagnosis, 2) how to care for their diagnosis at home, as well a 3) list of reasons why they would want to return to the ED prior to their follow-up appointment, should their condition change. Discharged      Diagnosis     Clinical Impression:   1. Altered mental status, unspecified altered mental status type        Attestations:     Joy Fabian MD

## 2022-01-12 NOTE — ED TRIAGE NOTES
Pt states he has experienced stroke like sx since December 22nd. He experienced a fall then, slurred speech, confusion. Was seen at PCP today, was told to come to ER today for stroke work up.

## 2022-01-12 NOTE — PROGRESS NOTES
Chief Complaint   Patient presents with    Annual Wellness Visit     Pt being seen today for overall health status  Pt has concerns about his mind and states he can't function     1. Have you been to the ER, urgent care clinic since your last visit? Hospitalized since your last visit? No    2. Have you seen or consulted any other health care providers outside of the [address] [place] [address] since your last visit? Include any pap smears or colon screening.  No     Pt has no other concerns

## 2022-01-12 NOTE — PROGRESS NOTES
Bere Gonzalez (: 1950) is a 66 y.o. male, established patient, here for evaluation of the following chief complaint(s): Annual Wellness Visit       ASSESSMENT/PLAN:  Below is the assessment and plan developed based on review of pertinent history, labs, studies, and medications. Diagnoses and all orders for this visit:    1. Confusion    2. Falling    3.  Type 2 diabetes mellitus with hyperglycemia, unspecified whether long term insulin use ([facility] [address]      Referral to [address] [place] [address] Emergency Care for eval  May likely have UTI given falls and confusion  Also need to r/o CVA  Patient brother will take him to be seen    SUBJECTIVE/OBJECTIVE:  HPI  Patient has had severe confusion and falling in the last 2 weeks  Mentation seems slowed  Brother with him today for VV  bp has been 156/98, saw cardio last week  Blood sugar has been running good, A1c yesterday 6.9  Very unsteady on his feet    Review of Systems   A comprehensive review of system was obtained and negative except findings in the HPI    No data recorded     Physical Exam    [INSTRUCTIONS:  \"[x]\" Indicates a positive item  \"[]\" Indicates a negative item  -- DELETE ALL ITEMS NOT EXAMINED]    Constitutional: [x] Appears well-developed and well-nourished [x] No apparent distress      [] Abnormal -     Mental status: [x] Alert and awake  [x] Oriented to person/place/time [x] Able to follow commands    [] Abnormal -     Eyes:   EOM    [x]  Normal    [] Abnormal -   Sclera  [x]  Normal    [] Abnormal -          Discharge [x]  None visible   [] Abnormal -     HENT: [x] Normocephalic, atraumatic  [] Abnormal -   [x] Mouth/Throat: Mucous membranes are moist    External Ears [x] Normal  [] Abnormal -    Neck: [x] No visualized mass [] Abnormal -     Pulmonary/Chest: [x] Respiratory effort normal   [x] No visualized signs of difficulty breathing or respiratory distress        [] Abnormal -      Musculoskeletal:   [x] Normal gait with no signs of ataxia [x] Normal range of motion of neck        [] Abnormal -     Neurological:        [x] No Facial Asymmetry (Cranial nerve 7 motor function) (limited exam due to video visit)          [x] No gaze palsy        [] Abnormal -          Skin:        [x] No significant exanthematous lesions or discoloration noted on facial skin         [] Abnormal -            Psychiatric:       [x] Normal Affect [] Abnormal -        [x] No Hallucinations    Other pertinent observable physical exam findings:-        On this date 01/12/2022 I have spent 25 minutes reviewing previous notes, test results and face to face (virtual) with the patient discussing the diagnosis and importance of compliance with the treatment plan as well as documenting on the day of the visit. Nay Shanti, was evaluated through a synchronous (real-time) audio-video encounter. The patient (or guardian if applicable) is aware that this is a billable service. Verbal consent to proceed has been obtained within the past 12 months. The visit was conducted pursuant to the emergency declaration under the [address] [place] [id number] and the [email or web address] and [employer] General Act. Patient identification was verified, and a caregiver was present when appropriate. The patient was located in a state where the provider was credentialed to provide care. An electronic signature was used to authenticate this note.   -- Ana M Turpin NP

## 2022-01-13 ENCOUNTER — DOCUMENTATION ONLY (OUTPATIENT)
Dept: FAMILY MEDICINE CLINIC | Age: 72
End: 2022-01-13

## 2022-01-13 LAB
FOLATE SERPL-MCNC: 15 NG/ML (ref 5–21)
VIT B12 SERPL-MCNC: 515 PG/ML (ref 193–986)

## 2022-01-13 NOTE — DISCHARGE INSTRUCTIONS
You were seen in the ER for your speech change, confusion, and movement changes. This is potentially due to a Parkinsonian condition. Your CT did not show a stroke and your bloodwork did not show any abnormalities. We have arranged for a neurologist to see you tomorrow (Dr. Edy Musa) regarding this problem for further more specific evaluation. Please go to this appointment. Return to the ED for any new confusion, vomiting, fevers, or any other new or concerning symptoms. Thank you! Thank you for allowing me to care for you in the emergency department. I sincerely hope that you are satisfied with your visit today. It is my goal to provide you with excellent care. Below you will find a list of your labs and imaging from your visit today. Should you have any questions regarding these results please do not hesitate to call the emergency department. Labs -     Recent Results (from the past 12 hour(s))   CBC WITH AUTOMATED DIFF    Collection Time: 01/12/22  4:00 PM   Result Value Ref Range    WBC 8.5 4.1 - 11.1 K/uL    RBC 4.29 4. 10 - 5.70 M/uL    HGB 13.5 12.1 - 17.0 g/dL    HCT 39.8 36.6 - 50.3 %    MCV 92.8 80.0 - 99.0 FL    MCH 31.5 26.0 - 34.0 PG    MCHC 33.9 30.0 - 36.5 g/dL    RDW 12.8 11.5 - 14.5 %    PLATELET 925 976 - 140 K/uL    MPV 10.9 8.9 - 12.9 FL    NEUTROPHILS 79 (H) 32 - 75 %    LYMPHOCYTES 12 12 - 49 %    MONOCYTES 7 5 - 13 %    EOSINOPHILS 1 0 - 7 %    BASOPHILS 1 0 - 1 %    IMMATURE GRANULOCYTES 0 0.0 - 0.5 %    ABS. NEUTROPHILS 6.7 1.8 - 8.0 K/UL    ABS. LYMPHOCYTES 1.0 0.8 - 3.5 K/UL    ABS. MONOCYTES 0.6 0.0 - 1.0 K/UL    ABS. EOSINOPHILS 0.1 0.0 - 0.4 K/UL    ABS. BASOPHILS 0.1 0.0 - 0.1 K/UL    ABS. IMM.  GRANS. 0.0 0.00 - 0.04 K/UL    DF AUTOMATED     METABOLIC PANEL, COMPREHENSIVE    Collection Time: 01/12/22  4:00 PM   Result Value Ref Range    Sodium 138 136 - 145 mmol/L    Potassium 4.0 3.5 - 5.1 mmol/L    Chloride 103 97 - 108 mmol/L    CO2 25 21 - 32 mmol/L    Anion gap 10 5 - 15 mmol/L    Glucose 138 (H) 65 - 100 mg/dL    BUN 30 (H) 6 - 20 mg/dL    Creatinine 1.51 (H) 0.70 - 1.30 mg/dL    BUN/Creatinine ratio 20 12 - 20      GFR est AA 55 (L) >60 ml/min/1.73m2    GFR est non-AA 46 (L) >60 ml/min/1.73m2    Calcium 8.8 8.5 - 10.1 mg/dL    Bilirubin, total 0.3 0.2 - 1.0 mg/dL    AST (SGOT) 17 15 - 37 U/L    ALT (SGPT) 21 12 - 78 U/L    Alk. phosphatase 57 45 - 117 U/L    Protein, total 6.9 6.4 - 8.2 g/dL    Albumin 3.4 (L) 3.5 - 5.0 g/dL    Globulin 3.5 2.0 - 4.0 g/dL    A-G Ratio 1.0 (L) 1.1 - 2.2     TSH 3RD GENERATION    Collection Time: 01/12/22  4:00 PM   Result Value Ref Range    TSH 1.55 0.36 - 3.74 uIU/mL   AMMONIA    Collection Time: 01/12/22  4:00 PM   Result Value Ref Range    Ammonia 19 <32 umol/L       Radiologic Studies -   CT HEAD WO CONT   Final Result   No acute intracranial process. Other findings as above. XR CHEST PORT   Final Result   No acute pulmonary process. CT Results  (Last 48 hours)                 01/12/22 1636  CT HEAD WO CONT Final result    Impression:  No acute intracranial process. Other findings as above. Narrative:  CT head, 1/12/2022       History: Stroke. Mass. Parkinson's. Technique: No intravenous contrast was administered. Multiple contiguous axial   images were acquired from the vertex to the skull base. Coronal and sagittal   reconstruction was made. All CT scans at this facility are performed using dose reduction optimization   techniques as appropriate to perform the exam including the following: Automated   exposure control, adjustments of the mA and/or kV according to patient size, or   use iterative reconstruction technique. Comparison: None. Findings:  Cortical volume loss is noted. The ventricular system is appropriate   in size and configuration. Periventricular and subcortical low attenuation is   suggestive of small vessel ischemic disease.   Nonacute appearing lacunar infarctions are seen in the basal ganglia and thalami. Gray-white   differentiation is preserved. No acute intracranial hemorrhage or midline shift   is identified. The calvarium is intact. The included orbits and globes are intact. There is mild opacification of the   left frontal sinus without acute sinusitis. The remainder of the visualized   paranasal sinuses and mastoid air cells are clear. CXR Results  (Last 48 hours)                 01/12/22 1629  XR CHEST PORT Final result    Impression:  No acute pulmonary process. Narrative:  Chest, 2 frontal views, 1/12/2022       History: Pneumonia. Comparison: Including chest 7/15/2017. Findings: The cardiac silhouette is within normal limits. The lungs are   adequately expanded. No hydrostatic edema is present. No focal consolidation,   pleural effusions or pneumothorax is identified. Degenerative changes are   present in the thoracic spine. If you feel that you have not received excellent quality care or timely care, please ask to speak to the nurse manager. Please choose us in the future for your continued health care needs. ------------------------------------------------------------------------------------------------------------  The exam and treatment you received in the Emergency Department were for an urgent problem and are not intended as complete care. It is important that you follow-up with a doctor, nurse practitioner, or physician assistant to:  (1) confirm your diagnosis,  (2) re-evaluation of changes in your illness and treatment, and  (3) for ongoing care. If your symptoms become worse or you do not improve as expected and you are unable to reach your usual health care provider, you should return to the Emergency Department. We are available 24 hours a day. Please take your discharge instructions with you when you go to your follow-up appointment.      If you have any problem arranging a follow-up appointment, contact the Emergency Department immediately. If a prescription has been provided, please have it filled as soon as possible to prevent a delay in treatment. Read the entire medication instruction sheet provided to you by the pharmacy. If you have any questions or reservations about taking the medication due to side effects or interactions with other medications, please call your primary care physician or contact the ER to speak with the charge nurse. Make an appointment with your family doctor or the physician you were referred to for follow-up of this visit as instructed on your discharge paperwork, as this is a mandatory follow-up. Return to the ER if you are unable to be seen or if you are unable to be seen in a timely manner. If you have any problem arranging the follow-up visit, contact the Emergency Department immediately.

## 2022-01-17 ENCOUNTER — TRANSCRIBE ORDER (OUTPATIENT)
Dept: SCHEDULING | Age: 72
End: 2022-01-17

## 2022-01-17 DIAGNOSIS — D68.52 HETEROZYGOUS FOR PROTHROMBIN G20210A MUTATION (HCC): Primary | ICD-10-CM

## 2022-02-01 LAB
ALBUMIN/CREAT UR: 21 MG/G CREAT (ref 0–29)
BUN SERPL-MCNC: 33 MG/DL (ref 8–27)
BUN/CREAT SERPL: 26 (ref 10–24)
CALCIUM SERPL-MCNC: 9.8 MG/DL (ref 8.6–10.2)
CHLORIDE SERPL-SCNC: 101 MMOL/L (ref 96–106)
CO2 SERPL-SCNC: 24 MMOL/L (ref 20–29)
CREAT SERPL-MCNC: 1.28 MG/DL (ref 0.76–1.27)
CREAT UR-MCNC: 274.3 MG/DL
EST. AVERAGE GLUCOSE BLD GHB EST-MCNC: 157 MG/DL
GLUCOSE SERPL-MCNC: 159 MG/DL (ref 65–99)
HBA1C MFR BLD: 7.1 % (ref 4.8–5.6)
INTERPRETATION: NORMAL
LDLC SERPL DIRECT ASSAY-MCNC: 116 MG/DL (ref 0–99)
MICROALBUMIN UR-MCNC: 56.9 UG/ML
POTASSIUM SERPL-SCNC: 5.3 MMOL/L (ref 3.5–5.2)
SODIUM SERPL-SCNC: 138 MMOL/L (ref 134–144)

## 2022-02-02 ENCOUNTER — HOSPITAL ENCOUNTER (OUTPATIENT)
Dept: MRI IMAGING | Age: 72
Discharge: HOME | End: 2022-02-02
Attending: PSYCHIATRY & NEUROLOGY
Payer: MEDICARE

## 2022-02-02 DIAGNOSIS — D68.52 HETEROZYGOUS FOR PROTHROMBIN G20210A MUTATION (HCC): ICD-10-CM

## 2022-02-02 PROCEDURE — 70551 MRI BRAIN STEM W/O DYE: CPT

## 2022-02-04 ENCOUNTER — TELEPHONE (OUTPATIENT)
Dept: ENDOCRINOLOGY | Age: 72
End: 2022-02-04

## 2022-02-04 NOTE — TELEPHONE ENCOUNTER
Shorty Moore listed on [address] form called stating Bella Summers had an earlier A1C drawn by another physician and that result was 6.9% on 01/11/2022. Shorty Moroe says pt also had lab work done recently at a [facility] and asked that Dr Rema Dejesus review those as well. Pt has an appt on Monday.

## 2022-02-07 ENCOUNTER — OFFICE VISIT (OUTPATIENT)
Dept: ENDOCRINOLOGY | Age: 72
End: 2022-02-07
Payer: MEDICARE

## 2022-02-07 VITALS
WEIGHT: 195 LBS | BODY MASS INDEX: 27.3 KG/M2 | HEART RATE: 85 BPM | HEIGHT: 71 IN | RESPIRATION RATE: 18 BRPM | TEMPERATURE: 95.9 F | DIASTOLIC BLOOD PRESSURE: 87 MMHG | OXYGEN SATURATION: 96 % | SYSTOLIC BLOOD PRESSURE: 139 MMHG

## 2022-02-07 DIAGNOSIS — E11.65 TYPE 2 DIABETES MELLITUS WITH HYPERGLYCEMIA, WITHOUT LONG-TERM CURRENT USE OF INSULIN (HCC): Primary | ICD-10-CM

## 2022-02-07 DIAGNOSIS — I10 ESSENTIAL HYPERTENSION: ICD-10-CM

## 2022-02-07 DIAGNOSIS — E03.8 SUBCLINICAL HYPOTHYROIDISM: ICD-10-CM

## 2022-02-07 PROCEDURE — G8754 DIAS BP LESS 90: HCPCS | Performed by: INTERNAL MEDICINE

## 2022-02-07 PROCEDURE — G8427 DOCREV CUR MEDS BY ELIG CLIN: HCPCS | Performed by: INTERNAL MEDICINE

## 2022-02-07 PROCEDURE — G8752 SYS BP LESS 140: HCPCS | Performed by: INTERNAL MEDICINE

## 2022-02-07 PROCEDURE — G8432 DEP SCR NOT DOC, RNG: HCPCS | Performed by: INTERNAL MEDICINE

## 2022-02-07 PROCEDURE — 99214 OFFICE O/P EST MOD 30 MIN: CPT | Performed by: INTERNAL MEDICINE

## 2022-02-07 PROCEDURE — G8419 CALC BMI OUT NRM PARAM NOF/U: HCPCS | Performed by: INTERNAL MEDICINE

## 2022-02-07 PROCEDURE — 2022F DILAT RTA XM EVC RTNOPTHY: CPT | Performed by: INTERNAL MEDICINE

## 2022-02-07 PROCEDURE — 3017F COLORECTAL CA SCREEN DOC REV: CPT | Performed by: INTERNAL MEDICINE

## 2022-02-07 PROCEDURE — 3051F HG A1C>EQUAL 7.0%<8.0%: CPT | Performed by: INTERNAL MEDICINE

## 2022-02-07 PROCEDURE — 1101F PT FALLS ASSESS-DOCD LE1/YR: CPT | Performed by: INTERNAL MEDICINE

## 2022-02-07 PROCEDURE — G8536 NO DOC ELDER MAL SCRN: HCPCS | Performed by: INTERNAL MEDICINE

## 2022-02-07 RX ORDER — LOSARTAN POTASSIUM 50 MG/1
TABLET ORAL
Qty: 90 TABLET | Refills: 3 | Status: SHIPPED | OUTPATIENT
Start: 2022-02-07

## 2022-02-07 RX ORDER — LANOLIN ALCOHOL/MO/W.PET/CERES
1000 CREAM (GRAM) TOPICAL EVERY OTHER DAY
COMMUNITY

## 2022-02-07 NOTE — PROGRESS NOTES
Kendal Acuna is a 66 y.o. male here for   Chief Complaint   Patient presents with    Diabetes    Thyroid Problem       1. Have you been to the ER, urgent care clinic since your last visit? Hospitalized since your last visit? -[facility] 1/12/22 for possible stroke    2. Have you seen or consulted any other health care providers outside of the [address] [place] [address] since your last visit?   Include any pap smears or colon screening.-Dr. Candelario Huff

## 2022-02-07 NOTE — PROGRESS NOTES
Earle Gaona MD    Patient Information  Date:2/7/2022  Name : Hernandez Messina 66 y.o.     YOB: 1950             Chief Complaint   Patient presents with    Diabetes    Thyroid Problem       History of Present Illness: Hernandez Messina is a 66 y.o. male here for fu of  Type 2 Diabetes Mellitus. Type 2 Diabetes was diagnosed several years ago  . He was managed by endocrinologist, Dr. Shahram Dallas in [place] in the past  He is taking Metformin, pioglitazone  Memory loss, seeing neurology, resting tremors  He is with his brother  Did not start taking losartan      Prior history  He does not believe in any medications, \"metformin and other medications for diabetes are very bad\" and had self discontinued in the past and I had to rediscussed the real evidence. In May 2021, [facility], A1c more than 11,      He thinks he might have had stroke in the past, had slurred speech, [facility], since that episode reports decreased memory       no chest pain or shortness of breath, severe headaches. Wt Readings from Last 3 Encounters:   02/07/22 195 lb (88.5 kg)   10/04/21 203 lb (92.1 kg)   07/28/21 199 lb (90.3 kg)       BP Readings from Last 3 Encounters:   02/07/22 139/87   01/12/22 (!) 174/112   10/04/21 129/71           Past Medical History:   Diagnosis Date    Diabetes ([email or web address]     Type 2, diet controlled    Headache     Hypertension     Sleep apnea     cpap    Subclinical hypothyroidism 5/30/2019     Current Outpatient Medications   Medication Sig    pioglitazone (ACTOS) 15 mg tablet Take 1 Tablet by mouth daily.  metFORMIN (GLUCOPHAGE) 500 mg tablet TAKE ONE TABLET BY MOUTH TWICE A DAY WITH A MEAL    Blood-Glucose Meter monitoring kit Use to check BG once daily.  E11.65    glucose blood VI test strips (OneTouch Ultra Test) strip USE TO TEST BLOOD GLUCOSE AS DIRECTED (Patient not taking: Reported on 1/12/2022)    OTHER Vit A  Vit C  Fish Oil  Cinnamon  Hemp    losartan (COZAAR) 50 mg tablet TAKE 1 TABLET BY MOUTH ONCE DAILY FOR  HYPERTENSION (Patient not taking: Reported on 1/12/2022)    lancets (One Touch Delica) 33 gauge misc Use to check BG once daily. Dx code E11.65 (Patient not taking: Reported on 1/12/2022)     No current facility-administered medications for this visit. Allergies   Allergen Reactions    Erythromycin Nausea and Vomiting         Review of Systems:  -   - Per HPI    Physical Examination:   Blood pressure 139/87, pulse 85, temperature (!) 95.9 °F (35.5 °C), temperature source Temporal, resp. rate 18, height 5' 10.5\" (1.791 m), weight 195 lb (88.5 kg), SpO2 96 %. Estimated body mass index is 27.58 kg/m² as calculated from the following:    Height as of this encounter: 5' 10.5\" (1.791 m). -   Weight as of this encounter: 195 lb (88.5 kg).   - General: pleasant, no distress, good eye contact  - HEENT: no pallor, no periorbital edema, EOMI  - Neck: supple,  - Cardiovascular: regular, normal rate, normal S1 and S2,   - Respiratory: clear to auscultation bilaterally  -   - Neurological: alert and oriented  - Psychiatric: normal mood and affect  - Skin: color, texture, turgor normal.               Data Reviewed:   Lab Results   Component Value Date/Time    Hemoglobin A1c 7.1 (H) 01/31/2022 12:00 AM    Hemoglobin A1c 6.8 (H) 09/12/2019 09:44 AM    Hemoglobin A1c 7.2 (H) 06/01/2018 11:30 AM    Hemoglobin A1c, External 6.9 01/11/2022 12:00 AM    Hemoglobin A1c, External 11.3 06/01/2021 12:00 AM    Hemoglobin A1c, External 9.2 01/19/2016 12:00 AM    Glucose 159 (H) 01/31/2022 12:00 AM    Glucose (POC) 117 (H) 12/14/2016 04:38 PM    Glucose  01/29/2020 11:18 AM    Microalb/Creat ratio (ug/mg creat.) 21 01/31/2022 12:00 AM    LDL,Direct 116 (H) 01/31/2022 12:00 AM    LDL, calculated 84 06/01/2018 11:30 AM    Creatinine 1.28 (H) 01/31/2022 12:00 AM      Lab Results   Component Value Date/Time    GFR est non-AA 55 (L) 01/31/2022 12:00 AM    GFR est AA 64 01/31/2022 12:00 AM    Creatinine 1.28 (H) 01/31/2022 12:00 AM    BUN 33 (H) 01/31/2022 12:00 AM    Sodium 138 01/31/2022 12:00 AM    Potassium 5.3 (H) 01/31/2022 12:00 AM    Chloride 101 01/31/2022 12:00 AM    CO2 24 01/31/2022 12:00 AM    Magnesium 2.1 07/15/2017 07:24 PM    Phosphorus 4.9 (H) 07/15/2017 07:24 PM       Diabetic feet exam ; Oct 2021    H/o partial or complete amputation of foot : No  H/o previous foot ulceration : No  H/o pre - ulcerative callus : No  H/o peripheral neuropathy and callus : No  H/o poor circulation  : No  Foot deformity : No       Cr nl 8/16    Assessment/Plan:     1. Type 2 diabetes mellitus with hyperglycemia, without long-term current use of insulin (HCC)    2. Subclinical hypothyroidism    3. Essential hypertension        1. Type 2 Diabetes Mellitus   Lab Results   Component Value Date/Time    Hemoglobin A1c 7.1 (H) 01/31/2022 12:00 AM    Hemoglobin A1c (POC) 6.7 10/04/2021 01:15 PM    Hemoglobin A1c, External 6.9 01/11/2022 12:00 AM     A1C 6.7 10/2021, controlled  Metformin, Actos    2. HTN :  Compliance with the medications discussed, low-salt diet  Risks of untreated hypertension discussed  Continue to follow-up with PCP    Resume Losartan     3. Declined statin: Understands the benefits    4. Obesity:Body mass index is 27.58 kg/m². Discussed about the importance of exercise and carbohydrate portion control. 5.  JOVANY - CPAP     6. History of subclinical hypothyroidism- no therapy indicated      7. Memory loss followed by neurology    There are no Patient Instructions on file for this visit. Thank you for allowing me to participate in the care of this patient.     Sotero Tran MD      Patient verbalized understanding

## 2022-02-07 NOTE — LETTER
2/8/2022    Patient: Laureen Wilson   YOB: 1950   Date of Visit: 2/7/2022     Elsa Ng, [address] [address]  [address] [place]    Dear Elsa Ng MD,      Thank you for referring Mr. Bryan Duarte to [id number] [place] [address] for evaluation. My notes for this consultation are attached. If you have questions, please do not hesitate to call me. I look forward to following your patient along with you.       Sincerely,    Reid Sutherland MD

## 2022-02-16 ENCOUNTER — TELEPHONE (OUTPATIENT)
Dept: FAMILY MEDICINE CLINIC | Age: 72
End: 2022-02-16

## 2022-02-16 NOTE — TELEPHONE ENCOUNTER
----- Message from Manuel Spivey sent at 2/16/2022  1:07 PM EST -----  Subject: Message to Provider    QUESTIONS  Information for Provider? Patient wants to discuss on his 2/21/2022   appointment all of the test results, medications, and assisted living   facilities. Nilo Rico also wants to know if his brother Kathy Tse can listen in to the   appointment by phone.  ---------------------------------------------------------------------------  --------------  [address]  What is the best way for the office to contact you? OK to leave message on   voicemail  Preferred Call Back Phone Number? [phone]  ---------------------------------------------------------------------------  --------------  SCRIPT ANSWERS  Relationship to Patient?  Self

## 2022-02-16 NOTE — TELEPHONE ENCOUNTER
Returned call to patient. Verified and put his brother Des Vu on the phone. He states he will be taking care of his medical business and hopefully will be in the office with him next week during appt with Dr. Muna Dickinson. Questions answered about home health nursing to assist patient with medications, face timing during appt if he can not attend appointment, questions about assistant living facilities in the area. Brother Laureano Anton also states he called cardio, Dr. Emily Akbar to fax over latest visits and tests done. Will look out for them for appt.

## 2022-02-21 ENCOUNTER — OFFICE VISIT (OUTPATIENT)
Dept: FAMILY MEDICINE CLINIC | Age: 72
End: 2022-02-21
Payer: MEDICARE

## 2022-02-21 VITALS
HEART RATE: 76 BPM | RESPIRATION RATE: 20 BRPM | BODY MASS INDEX: 27.77 KG/M2 | WEIGHT: 194 LBS | TEMPERATURE: 97.9 F | DIASTOLIC BLOOD PRESSURE: 86 MMHG | OXYGEN SATURATION: 96 % | HEIGHT: 70 IN | SYSTOLIC BLOOD PRESSURE: 136 MMHG

## 2022-02-21 DIAGNOSIS — I10 ESSENTIAL HYPERTENSION: ICD-10-CM

## 2022-02-21 DIAGNOSIS — E11.65 TYPE 2 DIABETES MELLITUS WITH HYPERGLYCEMIA, WITHOUT LONG-TERM CURRENT USE OF INSULIN (HCC): ICD-10-CM

## 2022-02-21 DIAGNOSIS — I63.9 CEREBROVASCULAR ACCIDENT (CVA), UNSPECIFIED MECHANISM (HCC): ICD-10-CM

## 2022-02-21 DIAGNOSIS — E03.8 SUBCLINICAL HYPOTHYROIDISM: ICD-10-CM

## 2022-02-21 DIAGNOSIS — N17.9 ACUTE RENAL FAILURE, UNSPECIFIED ACUTE RENAL FAILURE TYPE (HCC): ICD-10-CM

## 2022-02-21 DIAGNOSIS — R47.1 DYSARTHRIA: Primary | ICD-10-CM

## 2022-02-21 PROCEDURE — 99214 OFFICE O/P EST MOD 30 MIN: CPT | Performed by: FAMILY MEDICINE

## 2022-02-21 PROCEDURE — G8427 DOCREV CUR MEDS BY ELIG CLIN: HCPCS | Performed by: FAMILY MEDICINE

## 2022-02-21 PROCEDURE — G8754 DIAS BP LESS 90: HCPCS | Performed by: FAMILY MEDICINE

## 2022-02-21 PROCEDURE — 3051F HG A1C>EQUAL 7.0%<8.0%: CPT | Performed by: FAMILY MEDICINE

## 2022-02-21 PROCEDURE — G8536 NO DOC ELDER MAL SCRN: HCPCS | Performed by: FAMILY MEDICINE

## 2022-02-21 PROCEDURE — G8510 SCR DEP NEG, NO PLAN REQD: HCPCS | Performed by: FAMILY MEDICINE

## 2022-02-21 PROCEDURE — 1101F PT FALLS ASSESS-DOCD LE1/YR: CPT | Performed by: FAMILY MEDICINE

## 2022-02-21 PROCEDURE — G0463 HOSPITAL OUTPT CLINIC VISIT: HCPCS | Performed by: FAMILY MEDICINE

## 2022-02-21 PROCEDURE — 2022F DILAT RTA XM EVC RTNOPTHY: CPT | Performed by: FAMILY MEDICINE

## 2022-02-21 PROCEDURE — G8752 SYS BP LESS 140: HCPCS | Performed by: FAMILY MEDICINE

## 2022-02-21 PROCEDURE — G8419 CALC BMI OUT NRM PARAM NOF/U: HCPCS | Performed by: FAMILY MEDICINE

## 2022-02-21 PROCEDURE — 3017F COLORECTAL CA SCREEN DOC REV: CPT | Performed by: FAMILY MEDICINE

## 2022-02-21 RX ORDER — ATORVASTATIN CALCIUM 40 MG/1
40 TABLET, FILM COATED ORAL DAILY
COMMUNITY

## 2022-02-21 RX ORDER — ASPIRIN 81 MG/1
81 TABLET ORAL DAILY
COMMUNITY

## 2022-02-21 NOTE — PROGRESS NOTES
Patient here for hosp f/u, mult infarct. Patient had dizziness and falls. Went to [facility] 12/28/2021. Went To ED 1/12/2022 Followed up by cardio, neuro and endocrine. Dr. Sreedhar Morales, Dr. Ene Kaiser, Dr. Kalyan Higginbotham. see notes in epic. [employer], on [address], is going to start managing patient's care. I spoke to him and patient last week and was going to try to make the appointment today. Patient states his brother is on his way, appt was 2:15pm, patient just got here early. It would be in the best interest to wait for brother to arrive. Brother and I were talking last week about possible having home health nursing in to assist patient with medications and check bp, PT/OT to help patient with balance so no further falls occure. Patient declines at this time. He states since hospitalization , he has been getting better. Stronger. He does have new medications since hospitalization. Reviewed from hospital list. Actos and metformin, ordered by endocrine, are two new diabetes. Patient does not remember what the other two medications are. Hopefully his brother would know when he gets here. 1. Have you been to the ER, urgent care clinic since your last visit? Hospitalized since your last visit? Yes When: see notes    2. Have you seen or consulted any other health care providers outside of the [address] [place] [address] since your last visit? Include any pap smears or colon screening. Yes Where: cardio, neuro, Dr. Tracy Reid, Dr. Francisco Javier Shannon           Chief Complaint   Patient presents with   [facility] Follow Up     dizzy, falling    Hypertension     at [facility]     He is a 66 y.o. male who presents for evalution. Reviewed PmHx, RxHx, FmHx, SocHx, AllgHx and updated and dated in the chart.     Patient Active Problem List    Diagnosis    JOVANY on CPAP    Subclinical hypothyroidism    Advance care planning     dwp lw      Acute [facility]    Type 2 diabetes mellitus with hyperglycemia, without long-term current use of insulin ([facility]    Essential hypertension       Review of Systems - negative except as listed above in the HPI    Objective:     Vitals:    02/21/22 1338   BP: 136/86   Pulse: 76   Resp: 20   Temp: 97.9 °F (36.6 °C)   SpO2: 96%   Weight: 194 lb (88 kg)   Height: 5' 10\" (1.778 m)         Assessment/ Plan:   Diagnoses and all orders for this visit:    1. Dysarthria  -     MYASTHENIA GRAVIS PANEL (COMPLETE AB PROFILE); Future  -     CK; Future  -     CRP, HIGH SENSITIVITY; Future  -     SED RATE (ESR); Future  -     ALDOLASE; Future  -fax labs to specialist, see MRI report and scanned records    2. Type 2 diabetes mellitus with hyperglycemia, without long-term current use of insulin ([facility])  Lab Results   Component Value Date/Time    Hemoglobin A1c 7.1 (H) 01/31/2022 12:00 AM    Hemoglobin A1c (POC) 6.7 10/04/2021 01:15 PM    Hemoglobin A1c, External 6.9 01/11/2022 12:00 AM     -seeing endo    3. Essential hypertension  -at goal    4. Subclinical hypothyroidism  -labs at goal    5. Acute renal failure, unspecified acute renal failure type ([facility]  -stable    6. Cerebrovascular accident (CVA), unspecified mechanism ([facility]  -work up ongoing           I have discussed the diagnosis with the patient and the intended plan as seen in the above orders. The patient understands and agrees with the plan. The patient has received an after-visit summary and questions were answered concerning future plans. Medication Side Effects and Warnings were discussed with patient  Patient Labs were reviewed and or requested:  Patient Past Records were reviewed and or requested    Keysha Conteh M.D. There are no Patient Instructions on file for this visit.

## 2022-02-28 ENCOUNTER — DOCUMENTATION ONLY (OUTPATIENT)
Dept: FAMILY MEDICINE CLINIC | Age: 72
End: 2022-02-28

## 2022-02-28 NOTE — PROGRESS NOTES
Lab results called from [employer], not released in computer. Dr. Nhung Okeefe signed labs and faxed to Dr. Kushal Chaparro at [phone]. Copy mailed to patient.

## 2022-03-06 LAB
ACHR BIND AB SER-SCNC: 0.05 NMOL/L (ref 0–0.24)
ACHR BLOCK AB SER-ACNC: 12 % (ref 0–25)
ACHR MOD AB/ACHR TOTAL SFR SER: NORMAL %
ALDOLASE SERPL-CCNC: 3.6 U/L (ref 3.3–10.3)
CK SERPL-CCNC: 70 U/L (ref 41–331)
CRP SERPL HS-MCNC: 0.29 MG/L (ref 0–3)
ERYTHROCYTE [SEDIMENTATION RATE] IN BLOOD BY WESTERGREN METHOD: 17 MM/HR (ref 0–30)
MUSK AB SER IA-ACNC: <1 U/ML
REFLEX INFORMATION: NORMAL
STRIA MUS AB TITR SER IF: NORMAL {TITER}

## 2022-03-18 PROBLEM — Z71.89 ADVANCE CARE PLANNING: Status: ACTIVE | Noted: 2017-07-25

## 2022-03-19 PROBLEM — Z99.89 OSA ON CPAP: Status: ACTIVE | Noted: 2021-07-14

## 2022-03-19 PROBLEM — G47.33 OSA ON CPAP: Status: ACTIVE | Noted: 2021-07-14

## 2022-03-19 PROBLEM — N17.9 ACUTE RENAL FAILURE (HCC): Status: ACTIVE | Noted: 2017-07-19

## 2022-03-19 PROBLEM — E03.8 SUBCLINICAL HYPOTHYROIDISM: Status: ACTIVE | Noted: 2019-05-30

## 2022-06-01 LAB
BUN SERPL-MCNC: 38 MG/DL (ref 8–27)
BUN/CREAT SERPL: 30 (ref 10–24)
CALCIUM SERPL-MCNC: 9.6 MG/DL (ref 8.6–10.2)
CHLORIDE SERPL-SCNC: 98 MMOL/L (ref 96–106)
CO2 SERPL-SCNC: 21 MMOL/L (ref 20–29)
CREAT SERPL-MCNC: 1.28 MG/DL (ref 0.76–1.27)
EGFR: 59 ML/MIN/1.73
EST. AVERAGE GLUCOSE BLD GHB EST-MCNC: 151 MG/DL
GLUCOSE SERPL-MCNC: 132 MG/DL (ref 65–99)
HBA1C MFR BLD: 6.9 % (ref 4.8–5.6)
POTASSIUM SERPL-SCNC: 4.8 MMOL/L (ref 3.5–5.2)
SODIUM SERPL-SCNC: 137 MMOL/L (ref 134–144)

## 2022-06-07 ENCOUNTER — OFFICE VISIT (OUTPATIENT)
Dept: ENDOCRINOLOGY | Age: 72
End: 2022-06-07
Payer: MEDICARE

## 2022-06-07 VITALS
SYSTOLIC BLOOD PRESSURE: 125 MMHG | HEART RATE: 72 BPM | TEMPERATURE: 96.6 F | DIASTOLIC BLOOD PRESSURE: 71 MMHG | HEIGHT: 70 IN | OXYGEN SATURATION: 95 % | WEIGHT: 184 LBS | RESPIRATION RATE: 20 BRPM | BODY MASS INDEX: 26.34 KG/M2

## 2022-06-07 DIAGNOSIS — E11.65 TYPE 2 DIABETES MELLITUS WITH HYPERGLYCEMIA, WITHOUT LONG-TERM CURRENT USE OF INSULIN (HCC): Primary | ICD-10-CM

## 2022-06-07 DIAGNOSIS — I10 ESSENTIAL HYPERTENSION: ICD-10-CM

## 2022-06-07 PROCEDURE — 1123F ACP DISCUSS/DSCN MKR DOCD: CPT | Performed by: INTERNAL MEDICINE

## 2022-06-07 PROCEDURE — G8432 DEP SCR NOT DOC, RNG: HCPCS | Performed by: INTERNAL MEDICINE

## 2022-06-07 PROCEDURE — 3044F HG A1C LEVEL LT 7.0%: CPT | Performed by: INTERNAL MEDICINE

## 2022-06-07 PROCEDURE — G8754 DIAS BP LESS 90: HCPCS | Performed by: INTERNAL MEDICINE

## 2022-06-07 PROCEDURE — 1101F PT FALLS ASSESS-DOCD LE1/YR: CPT | Performed by: INTERNAL MEDICINE

## 2022-06-07 PROCEDURE — G8427 DOCREV CUR MEDS BY ELIG CLIN: HCPCS | Performed by: INTERNAL MEDICINE

## 2022-06-07 PROCEDURE — 99214 OFFICE O/P EST MOD 30 MIN: CPT | Performed by: INTERNAL MEDICINE

## 2022-06-07 PROCEDURE — G8417 CALC BMI ABV UP PARAM F/U: HCPCS | Performed by: INTERNAL MEDICINE

## 2022-06-07 PROCEDURE — 2022F DILAT RTA XM EVC RTNOPTHY: CPT | Performed by: INTERNAL MEDICINE

## 2022-06-07 PROCEDURE — G8752 SYS BP LESS 140: HCPCS | Performed by: INTERNAL MEDICINE

## 2022-06-07 PROCEDURE — 3017F COLORECTAL CA SCREEN DOC REV: CPT | Performed by: INTERNAL MEDICINE

## 2022-06-07 PROCEDURE — G8536 NO DOC ELDER MAL SCRN: HCPCS | Performed by: INTERNAL MEDICINE

## 2022-06-07 NOTE — PROGRESS NOTES
Cecilio Kirk is a 66 y.o. male here for   Chief Complaint   Patient presents with    Diabetes       1. Have you been to the ER, urgent care clinic since your last visit? Hospitalized since your last visit? -no    2. Have you seen or consulted any other health care providers outside of the [address] [place] [address] since your last visit?   Include any pap smears or colon screening.-no

## 2022-06-07 NOTE — PROGRESS NOTES
Brennen Smith MD    Patient Information  Date:6/7/2022  Name : Samia Acuna 66 y.o.     YOB: 1950             Chief Complaint   Patient presents with    Diabetes       History of Present Illness: Samia Acuna is a 66 y.o. male here for fu of  Type 2 Diabetes Mellitus. Type 2 Diabetes was diagnosed several years ago  . He was managed by endocrinologist, Dr. Cheyenne Tobar in [place] in the past  He is taking Metformin, pioglitazone  Memory loss, followed by neurology, had MRI brain, lacunar/basal ganglia infarcts  He is with his brother      Prior history  He does not believe in any medications, \"metformin and other medications for diabetes are very bad\" and had self discontinued in the past and I had to rediscussed the real evidence. In May 2021, [facility], A1c more than 11,      He thinks he might have had stroke in the past, had slurred speech, [facility], since that episode reports decreased memory       no chest pain or shortness of breath, severe headaches. Wt Readings from Last 3 Encounters:   06/07/22 184 lb (83.5 kg)   02/21/22 194 lb (88 kg)   02/07/22 195 lb (88.5 kg)       BP Readings from Last 3 Encounters:   06/07/22 125/71   02/21/22 136/86   02/07/22 139/87           Past Medical History:   Diagnosis Date    [facility]     Type 2, diet controlled    Headache     Hypertension     Sleep apnea     cpap    Stroke [facility]) 2016    Subclinical hypothyroidism 5/30/2019     Current Outpatient Medications   Medication Sig    atorvastatin (LIPITOR) 40 mg tablet Take 40 mg by mouth daily.  aspirin delayed-release 81 mg tablet Take 81 mg by mouth daily.  losartan (COZAAR) 50 mg tablet TAKE 1 TABLET BY MOUTH ONCE DAILY FOR  HYPERTENSION    cyanocobalamin (Vitamin B-12) 1,000 mcg tablet Take 1,000 mcg by mouth every other day.     vitamin E acetate (VITAMIN E PO) Take 180 mg by mouth every other day.  docosahexaenoic acid/epa (FISH OIL PO) Take 1,000 mg by mouth every other day.  ginkgo biloba 500 mg cap Take 1 Tablet by mouth every other day.  COCONUT OIL PO Take 1,000 mg by mouth every other day.  elderberry fruit (ELDERBERRY PO) Take 1,000 mg by mouth every other day.  ASHWAGANDHA ROOT EXTRACT PO Take 460 mg by mouth every other day.  TURMERIC PO Take 500 mg by mouth every other day.  OTHER Vit A  Vit C  Fish Oil  Cinnamon  Hemp    pioglitazone (ACTOS) 15 mg tablet Take 1 Tablet by mouth daily.  metFORMIN (GLUCOPHAGE) 500 mg tablet TAKE ONE TABLET BY MOUTH TWICE A DAY WITH A MEAL (Patient taking differently: Take 500 mg by mouth daily (with breakfast). )    Blood-Glucose Meter monitoring kit Use to check BG once daily. E11.65    glucose blood VI test strips (OneTouch Ultra Test) strip USE TO TEST BLOOD GLUCOSE AS DIRECTED (Patient not taking: Reported on 1/12/2022)    lancets (One Touch Delica) 33 gauge misc Use to check BG once daily. Dx code E11.65 (Patient not taking: Reported on 1/12/2022)     No current facility-administered medications for this visit. Allergies   Allergen Reactions    Erythromycin Nausea and Vomiting         Review of Systems:  -   - Per HPI    Physical Examination:   Blood pressure 125/71, pulse 72, temperature (!) 96.6 °F (35.9 °C), temperature source Temporal, resp. rate 20, height 5' 10\" (1.778 m), weight 184 lb (83.5 kg), SpO2 95 %. Estimated body mass index is 26.4 kg/m² as calculated from the following:    Height as of this encounter: 5' 10\" (1.778 m). -   Weight as of this encounter: 184 lb (83.5 kg).   - General: pleasant, no distress, good eye contact  - HEENT: no pallor, no periorbital edema, EOMI  - Neck: supple,  - Cardiovascular: regular, normal rate, normal S1 and S2,   - Respiratory: clear to auscultation bilaterally  -   - Neurological: alert and oriented  - Psychiatric: normal mood and affect  - Skin: color, texture, turgor normal.               Data Reviewed:   Lab Results   Component Value Date/Time    Hemoglobin A1c 6.9 (H) 05/31/2022 12:00 AM    Hemoglobin A1c 7.1 (H) 01/31/2022 12:00 AM    Hemoglobin A1c 6.8 (H) 09/12/2019 09:44 AM    Hemoglobin A1c, External 6.9 01/11/2022 12:00 AM    Hemoglobin A1c, External 11.3 06/01/2021 12:00 AM    Hemoglobin A1c, External 9.2 01/19/2016 12:00 AM    Glucose 132 (H) 05/31/2022 12:00 AM    Glucose (POC) 117 (H) 12/14/2016 04:38 PM    Glucose  01/29/2020 11:18 AM    Microalb/Creat ratio (ug/mg creat.) 21 01/31/2022 12:00 AM    LDL,Direct 116 (H) 01/31/2022 12:00 AM    LDL, calculated 84 06/01/2018 11:30 AM    Creatinine 1.28 (H) 05/31/2022 12:00 AM      Lab Results   Component Value Date/Time    GFR est non-AA 55 (L) 01/31/2022 12:00 AM    GFR est AA 64 01/31/2022 12:00 AM    Creatinine 1.28 (H) 05/31/2022 12:00 AM    BUN 38 (H) 05/31/2022 12:00 AM    Sodium 137 05/31/2022 12:00 AM    Potassium 4.8 05/31/2022 12:00 AM    Chloride 98 05/31/2022 12:00 AM    CO2 21 05/31/2022 12:00 AM    Magnesium 2.1 07/15/2017 07:24 PM    Phosphorus 4.9 (H) 07/15/2017 07:24 PM       Diabetic feet exam ; Oct 2021    H/o partial or complete amputation of foot : No  H/o previous foot ulceration : No  H/o pre - ulcerative callus : No  H/o peripheral neuropathy and callus : No  H/o poor circulation  : No  Foot deformity : No       Cr nl 8/16    Assessment/Plan:     1. Type 2 diabetes mellitus with hyperglycemia, without long-term current use of insulin ([facility] [facility] [address]    2. Essential hypertension        1. Type 2 Diabetes Mellitus   Lab Results   Component Value Date/Time    Hemoglobin A1c 6.9 (H) 05/31/2022 12:00 AM    Hemoglobin A1c (POC) 6.7 10/04/2021 01:15 PM    Hemoglobin A1c, External 6.9 01/11/2022 12:00 AM     A1C 6.7 10/2021, controlled  Metformin, Actos    2. HTN :  Controlled      3. Declined statin: Understands the benefits    4. Obesity:Body mass index is 26.4 kg/m².   Discussed about the importance of exercise and carbohydrate portion control. 5.  JOVANY - CPAP     6. History of subclinical hypothyroidism- no therapy indicated      7. Memory loss followed by neurology    There are no Patient Instructions on file for this visit. Follow-up and Dispositions    · Return in about 5 months (around 11/7/2022). Thank you for allowing me to participate in the care of this patient.     Starr Melvin MD      Patient verbalized understanding

## 2022-06-07 NOTE — LETTER
6/8/2022    Patient: Alexi Prado   YOB: 1950   Date of Visit: 6/7/2022     Sandeep Cleveland, [address] [facility] [address]  [address] [place] [facility] [address]    Dear Sandeep Cleveland MD,      Thank you for referring Mr. Bryan Duarte to [address] [place] [address] for evaluation. My notes for this consultation are attached. If you have questions, please do not hesitate to call me. I look forward to following your patient along with you.       Sincerely,    Rani Lockwood MD

## 2022-08-29 ENCOUNTER — DOCUMENTATION ONLY (OUTPATIENT)
Dept: SLEEP MEDICINE | Age: 72
End: 2022-08-29

## 2022-08-29 NOTE — PROGRESS NOTES
Sent Release of Information form to patient's brother, Deneen Denise at [email or web address] for patient to complete. Patient is moving to [facility] on 9/6/2022.

## 2022-08-30 DIAGNOSIS — E11.65 TYPE 2 DIABETES MELLITUS WITH HYPERGLYCEMIA, UNSPECIFIED WHETHER LONG TERM INSULIN USE (HCC): ICD-10-CM

## 2022-08-31 DIAGNOSIS — E11.65 TYPE 2 DIABETES MELLITUS WITH HYPERGLYCEMIA, UNSPECIFIED WHETHER LONG TERM INSULIN USE (HCC): ICD-10-CM

## 2022-08-31 RX ORDER — METFORMIN HYDROCHLORIDE 500 MG/1
TABLET ORAL
Qty: 180 TABLET | Refills: 0 | Status: SHIPPED | OUTPATIENT
Start: 2022-08-31

## 2022-08-31 RX ORDER — METFORMIN HYDROCHLORIDE 500 MG/1
TABLET ORAL
Qty: 180 TABLET | Refills: 0 | Status: SHIPPED | OUTPATIENT
Start: 2022-08-31 | End: 2022-08-31 | Stop reason: SDUPTHER

## 2022-08-31 NOTE — TELEPHONE ENCOUNTER
Patient needs refill on Metformin sent to [facility]. Patient brother says this will be last refill. He will be moving to [place] within the next month. He will be in an assisted living facility.

## 2022-09-20 NOTE — PROGRESS NOTES
[facility] report was put on NP [employer] desk No new care gaps identified.  [facility] Embedded Care Gaps. Reference number: [id number]. 9/19/2022   9:16:49 PM CDT

## 2023-08-18 ENCOUNTER — TELEPHONE (OUTPATIENT)
Age: 73
End: 2023-08-18

## 2023-08-18 NOTE — TELEPHONE ENCOUNTER
Patient's brother phoned the office requesting an order for a new device. As his brother is in an assisted living facility in [place]. He was informed that since we have not seen his brother in over 2 years, that Dr. Ever Jones write an order. Nor can a virtual visit with an NP be scheduled.

## 2023-10-03 NOTE — PATIENT INSTRUCTIONS
[address] [facility] [place]. [place], [address]  Tel.  [phone]  Fax. 100 [facility] 60  [place], [address] [facility]  Tel.  [phone]  Fax. [phone] [address]  Tel.  [phone]  Fax. [phone]     Learning About CPAP for Sleep Apnea  What is CPAP? CPAP is a small machine that you use at home every night while you sleep. It increases air pressure in your throat to keep your airway open. When you have sleep apnea, this can help you sleep better so you feel much better. CPAP stands for \"continuous positive airway pressure. \"  The CPAP machine will have one of the following:  · A mask that covers your nose and mouth  · Prongs that fit into your nose  · A mask that covers your nose only, the most common type. This type is called NCPAP. The N stands for \"nasal.\"  Why is it done? CPAP is usually the best treatment for obstructive sleep apnea. It is the first treatment choice and the most widely used. Your doctor may suggest CPAP if you have:  · Moderate to severe sleep apnea. · Sleep apnea and coronary artery disease (CAD) or heart failure. How does it help? · CPAP can help you have more normal sleep, so you feel less sleepy and more alert during the daytime. · CPAP may help keep heart failure or other heart problems from getting worse. · NCPAP may help lower your blood pressure. · If you use CPAP, your bed partner may also sleep better because you are not snoring or restless. What are the side effects? Some people who use CPAP have:  · A dry or stuffy nose and a sore throat. · Irritated skin on the face. · Sore eyes. · Bloating. If you have any of these problems, work with your doctor to fix them. Here are some things you can try:  · Be sure the mask or nasal prongs fit well. · See if your doctor can adjust the pressure of your CPAP. · If your nose is dry, try a humidifier.   · If your nose is runny or stuffy, try Left message to call back.   decongestant medicine or a steroid nasal spray. If these things do not help, you might try a different type of machine. Some machines have air pressure that adjusts on its own. Others have air pressures that are different when you breathe in than when you breathe out. This may reduce discomfort caused by too much pressure in your nose. Where can you learn more? Go to [employer]  Enter Orjeania Melquiades in the search box to learn more about \"Learning About CPAP for Sleep Apnea. \"   © 8166-2450 Healthwise, Incorporated. Care instructions adapted under license by [employer] (which disclaims liability or warranty for this information). This care instruction is for use with your licensed healthcare professional. If you have questions about a medical condition or this instruction, always ask your healthcare professional. [address] any warranty or liability for your use of this information. Content Version: 2.7.76365; Last Revised: January 11, 2010  PROPER SLEEP HYGIENE    What to avoid  · Do not have drinks with caffeine, such as coffee or black tea, for 8 hours before bed. · Do not smoke or use other types of tobacco near bedtime. Nicotine is a stimulant and can keep you awake. · Avoid drinking alcohol late in the evening, because it can cause you to wake in the middle of the night. · Do not eat a big meal close to bedtime. If you are hungry, eat a light snack. · Do not drink a lot of water close to bedtime, because the need to urinate may wake you up during the night. · Do not read or watch TV in bed. Use the bed only for sleeping and sexual activity. What to try  · Go to bed at the same time every night, and wake up at the same time every morning. Do not take naps during the day. · Keep your bedroom quiet, dark, and cool. · Get regular exercise, but not within 3 to 4 hours of your bedtime. .  · Sleep on a comfortable pillow and mattress.   · If watching the clock makes you anxious, turn it facing away from you so you cannot see the time. · If you worry when you lie down, start a worry book. Well before bedtime, write down your worries, and then set the book and your concerns aside. · Try meditation or other relaxation techniques before you go to bed. · If you cannot fall asleep, get up and go to another room until you feel sleepy. Do something relaxing. Repeat your bedtime routine before you go to bed again. · Make your house quiet and calm about an hour before bedtime. Turn down the lights, turn off the TV, log off the computer, and turn down the volume on music. This can help you relax after a busy day. Drowsy Driving: The [facility] cites drowsiness as a causing factor in more than 821,515 police reported crashes annually, resulting in 76,000 injuries and 1,500 deaths. Other surveys suggest 55% of people polled have driven while drowsy in the past year, 23% had fallen asleep but not crashed, 3% crashed, and 2% had and accident due to drowsy driving. Who is at risk? Young Drivers: One study of drowsy driving accidents states that 55% of the drivers were under 25 years. Of those, 75% were male. Shift Workers and Travelers: People who work overnight or travel across time zones frequently are at higher risk of experiencing Circadian Rhythm Disorders. They are trying to work and function when their body is programed to sleep. Sleep Deprived: Lack of sleep has a serious impact on your ability to pay attention or focus on a task. Consistently getting less than the average of 8 hours your body needs creates partial or cumulative sleep deprivation. Untreated Sleep Disorders: Sleep Apnea, Narcolepsy, R.L.S., and other sleep disorders (untreated) prevent a person from getting enough restful sleep. This leads to excessive daytime sleepiness and increases the risk for drowsy driving accidents by up to 7 times.   Medications / Alcohol: Even over the counter medications can cause drowsiness. Medications that impair a drivers attention should have a warning label. Alcohol naturally makes you sleepy and on its own can cause accidents. Combined with excessive drowsiness its effects are amplified. Signs of Drowsy Driving:   * You don't remember driving the last few miles   * You may drift out of your mary   * You are unable to focus and your thoughts wander   * You may yawn more often than normal   * You have difficulty keeping your eyes open / nodding off   * Missing traffic signs, speeding, or tailgating  Prevention-   Good sleep hygiene, lifestyle and behavioral choices have the most impact on drowsy driving. There is no substitute for sleep and the average person requires 8 hours nightly. If you find yourself driving drowsy, stop and sleep. Consider the sleep hygiene tips provided during your visit as well. Medication Refill Policy: Refills for all medications require 1 week advance notice. Please have your pharmacy fax a refill request. We are unable to fax, or call in \"controled substance\" medications and you will need to pick these prescriptions up from our office. [employer] Activation    Thank you for requesting access to [employer]. Please follow the instructions below to securely access and download your online medical record. [employer] allows you to send messages to your doctor, view your test results, renew your prescriptions, schedule appointments, and more. How Do I Sign Up? 1. In your internet browser, go to [email or web address]. 2. Click on the First Time User? Click Here link in the Sign In box. You will see the New Member Sign Up page. 3. Enter your [employer] Access Code exactly as it appears below. You will not need to use this code after youve completed the sign-up process. If you do not sign up before the expiration date, you must request a new code.     [employer] Access Code: [email or web address]  Expires: 2017  5:09 PM (This is the date your [employer] access code will )    4. Enter the last four digits of your Social Security Number (xxxx) and Date of Birth (mm/dd/yyyy) as indicated and click Submit. You will be taken to the next sign-up page. 5. Create a [employer] ID. This will be your [employer] login ID and cannot be changed, so think of one that is secure and easy to remember. 6. Create a [employer] password. You can change your password at any time. 7. Enter your Password Reset Question and Answer. This can be used at a later time if you forget your password. 8. Enter your e-mail address. You will receive e-mail notification when new information is available in [address] 9. Click Sign Up. You can now view and download portions of your medical record. 10. Click the Download Summary menu link to download a portable copy of your medical information. Additional Information    If you have questions, please call [phone]. Remember, [employer] is NOT to be used for urgent needs. For medical emergencies, dial 911. Starting a Weight Loss Plan: After Your Visit  Your Care Instructions  If you are thinking about losing weight, it can be hard to know where to start. Your doctor can help you set up a weight loss plan that best meets your needs. You may want to take a class on nutrition or exercise, or join a weight loss support group. If you have questions about how to make changes to your eating or exercise habits, ask your doctor about seeing a registered dietitian or an exercise specialist.  It can be a big challenge to lose weight. But you do not have to make huge changes at once. Make small changes, and stick with them. When those changes become habit, add a few more changes. If you do not think you are ready to make changes right now, try to pick a date in the future.  Make an appointment to see your doctor to discuss whether the time is right for you to start a plan.  Follow-up care is a key part of your treatment and safety. Be sure to make and go to all appointments, and call your doctor if you are having problems. It's also a good idea to know your test results and keep a list of the medicines you take. How can you care for yourself at home? · Set realistic goals. Many people expect to lose much more weight than is likely. A weight loss of 5% to 10% of your body weight may be enough to improve your health. · Get family and friends involved to provide support. Talk to them about why you are trying to lose weight, and ask them to help. They can help by participating in exercise and having meals with you, even if they may be eating something different. · Find what works best for you. If you do not have time or do not like to cook, a program that offers meal replacement bars or shakes may be better for you. Or if you like to prepare meals, finding a plan that includes daily menus and recipes may be best.  · Ask your doctor about other health professionals who can help you achieve your weight loss goals. ¨ A dietitian can help you make healthy changes in your diet. ¨ An exercise specialist or  can help you develop a safe and effective exercise program.  ¨ A counselor or psychiatrist can help you cope with issues such as depression, anxiety, or family problems that can make it hard to focus on weight loss. · Consider joining a support group for people who are trying to lose weight. Your doctor can suggest groups in your area. Where can you learn more? Go to [employer]be  Enter U357 in the search box to learn more about \"Starting a Weight Loss Plan: After Your Visit. \"   © 8621-6798 Healthwise, Incorporated. Care instructions adapted under license by Arina Galvez (which disclaims liability or warranty for this information).  This care instruction is for use with your licensed healthcare professional. If you have questions about a medical condition or this instruction, always ask your healthcare professional. [place] [address] any warranty or liability for your use of this information.   Content Version: 5.0.71552; Last Revised: September 1, 2009

## 2023-11-17 ENCOUNTER — CLINICAL DOCUMENTATION (OUTPATIENT)
Age: 73
End: 2023-11-17